# Patient Record
Sex: MALE | Race: WHITE | Employment: FULL TIME | URBAN - METROPOLITAN AREA
[De-identification: names, ages, dates, MRNs, and addresses within clinical notes are randomized per-mention and may not be internally consistent; named-entity substitution may affect disease eponyms.]

---

## 2021-12-06 ENCOUNTER — APPOINTMENT (OUTPATIENT)
Dept: CT IMAGING | Age: 61
DRG: 853 | End: 2021-12-06
Attending: STUDENT IN AN ORGANIZED HEALTH CARE EDUCATION/TRAINING PROGRAM
Payer: COMMERCIAL

## 2021-12-06 ENCOUNTER — ANESTHESIA (OUTPATIENT)
Dept: SURGERY | Age: 61
DRG: 853 | End: 2021-12-06
Payer: COMMERCIAL

## 2021-12-06 ENCOUNTER — APPOINTMENT (OUTPATIENT)
Dept: GENERAL RADIOLOGY | Age: 61
DRG: 853 | End: 2021-12-06
Attending: UROLOGY
Payer: COMMERCIAL

## 2021-12-06 ENCOUNTER — ANESTHESIA EVENT (OUTPATIENT)
Dept: SURGERY | Age: 61
DRG: 853 | End: 2021-12-06
Payer: COMMERCIAL

## 2021-12-06 ENCOUNTER — HOSPITAL ENCOUNTER (INPATIENT)
Age: 61
LOS: 15 days | Discharge: HOME | DRG: 853 | End: 2021-12-21
Attending: STUDENT IN AN ORGANIZED HEALTH CARE EDUCATION/TRAINING PROGRAM | Admitting: INTERNAL MEDICINE
Payer: COMMERCIAL

## 2021-12-06 ENCOUNTER — APPOINTMENT (OUTPATIENT)
Dept: GENERAL RADIOLOGY | Age: 61
DRG: 853 | End: 2021-12-06
Attending: STUDENT IN AN ORGANIZED HEALTH CARE EDUCATION/TRAINING PROGRAM
Payer: COMMERCIAL

## 2021-12-06 DIAGNOSIS — N17.9 SEPSIS WITH ACUTE RENAL FAILURE WITHOUT SEPTIC SHOCK, DUE TO UNSPECIFIED ORGANISM, UNSPECIFIED ACUTE RENAL FAILURE TYPE (HCC): Primary | ICD-10-CM

## 2021-12-06 DIAGNOSIS — A41.9 SEPSIS WITH ACUTE RENAL FAILURE WITHOUT SEPTIC SHOCK, DUE TO UNSPECIFIED ORGANISM, UNSPECIFIED ACUTE RENAL FAILURE TYPE (HCC): Primary | ICD-10-CM

## 2021-12-06 DIAGNOSIS — N20.1 URETEROLITHIASIS: ICD-10-CM

## 2021-12-06 DIAGNOSIS — I21.4 NSTEMI (NON-ST ELEVATED MYOCARDIAL INFARCTION) (HCC): ICD-10-CM

## 2021-12-06 DIAGNOSIS — R07.89 OTHER CHEST PAIN: ICD-10-CM

## 2021-12-06 DIAGNOSIS — R65.20 SEPSIS WITH ACUTE RENAL FAILURE WITHOUT SEPTIC SHOCK, DUE TO UNSPECIFIED ORGANISM, UNSPECIFIED ACUTE RENAL FAILURE TYPE (HCC): Primary | ICD-10-CM

## 2021-12-06 PROBLEM — N12 PYELONEPHRITIS: Status: ACTIVE | Noted: 2021-12-06

## 2021-12-06 LAB
ALBUMIN SERPL-MCNC: 2.9 G/DL (ref 3.5–5)
ALBUMIN/GLOB SERPL: 0.8 {RATIO} (ref 1.1–2.2)
ALP SERPL-CCNC: 112 U/L (ref 45–117)
ALT SERPL-CCNC: 49 U/L (ref 12–78)
ANION GAP SERPL CALC-SCNC: 10 MMOL/L (ref 5–15)
APPEARANCE UR: ABNORMAL
AST SERPL W P-5'-P-CCNC: 31 U/L (ref 15–37)
BACTERIA URNS QL MICRO: ABNORMAL /HPF
BACTERIA URNS QL MICRO: ABNORMAL /HPF
BASOPHILS # BLD: 0 K/UL (ref 0–0.1)
BASOPHILS NFR BLD: 0 % (ref 0–1)
BILIRUB SERPL-MCNC: 1 MG/DL (ref 0.2–1)
BILIRUB UR QL: NEGATIVE
BUN SERPL-MCNC: 40 MG/DL (ref 6–20)
BUN/CREAT SERPL: 18 (ref 12–20)
CA-I BLD-MCNC: 8.5 MG/DL (ref 8.5–10.1)
CHLORIDE SERPL-SCNC: 96 MMOL/L (ref 97–108)
CK SERPL-CCNC: 1255 NG/ML (ref 39–308)
CK SERPL-CCNC: 572 U/L (ref 39–308)
CO2 SERPL-SCNC: 22 MMOL/L (ref 21–32)
COLOR UR: ABNORMAL
COVID-19 RAPID TEST, COVR: NOT DETECTED
CREAT SERPL-MCNC: 2.19 MG/DL (ref 0.7–1.3)
DIFFERENTIAL METHOD BLD: ABNORMAL
EOSINOPHIL # BLD: 0.1 K/UL (ref 0–0.4)
EOSINOPHIL NFR BLD: 4 % (ref 0–7)
ERYTHROCYTE [DISTWIDTH] IN BLOOD BY AUTOMATED COUNT: 12.7 % (ref 11.5–14.5)
FLUAV AG NPH QL IA: NEGATIVE
FLUBV AG NOSE QL IA: NEGATIVE
GLOBULIN SER CALC-MCNC: 3.8 G/DL (ref 2–4)
GLUCOSE BLD STRIP.AUTO-MCNC: 154 MG/DL (ref 65–117)
GLUCOSE SERPL-MCNC: 143 MG/DL (ref 65–100)
GLUCOSE UR STRIP.AUTO-MCNC: NEGATIVE MG/DL
HCT VFR BLD AUTO: 39.2 % (ref 36.6–50.3)
HGB BLD-MCNC: 13.4 G/DL (ref 12.1–17)
HGB UR QL STRIP: ABNORMAL
IMM GRANULOCYTES # BLD AUTO: 0 K/UL
IMM GRANULOCYTES NFR BLD AUTO: 0 %
KETONES UR QL STRIP.AUTO: NEGATIVE MG/DL
LACTATE SERPL-SCNC: 1.3 MMOL/L (ref 0.4–2)
LACTATE SERPL-SCNC: 2.8 MMOL/L (ref 0.4–2)
LEUKOCYTE ESTERASE UR QL STRIP.AUTO: NEGATIVE
LYMPHOCYTES # BLD: 0.1 K/UL (ref 0.8–3.5)
LYMPHOCYTES NFR BLD: 6 % (ref 12–49)
MCH RBC QN AUTO: 29.9 PG (ref 26–34)
MCHC RBC AUTO-ENTMCNC: 34.2 G/DL (ref 30–36.5)
MCV RBC AUTO: 87.5 FL (ref 80–99)
MONOCYTES # BLD: 0 K/UL (ref 0–1)
MONOCYTES NFR BLD: 0 % (ref 5–13)
NEUTS SEG # BLD: 2 K/UL (ref 1.8–8)
NEUTS SEG NFR BLD: 90 % (ref 32–75)
NITRITE UR QL STRIP.AUTO: NEGATIVE
NRBC # BLD: 0 K/UL (ref 0–0.01)
NRBC BLD-RTO: 0 PER 100 WBC
PERFORMED BY, TECHID: ABNORMAL
PH UR STRIP: 5 [PH] (ref 5–8)
PLATELET # BLD AUTO: 116 K/UL (ref 150–400)
POTASSIUM SERPL-SCNC: 4 MMOL/L (ref 3.5–5.1)
PROCALCITONIN SERPL-MCNC: 173.45 NG/ML
PROT SERPL-MCNC: 6.7 G/DL (ref 6.4–8.2)
PROT UR STRIP-MCNC: 100 MG/DL
RBC # BLD AUTO: 4.48 M/UL (ref 4.1–5.7)
RBC #/AREA URNS HPF: ABNORMAL /HPF (ref 0–5)
RBC #/AREA URNS HPF: ABNORMAL /HPF (ref 0–5)
RBC MORPH BLD: ABNORMAL
SODIUM SERPL-SCNC: 128 MMOL/L (ref 136–145)
SP GR UR REFRACTOMETRY: 1.01 (ref 1–1.03)
SPECIMEN SOURCE: NORMAL
TROPONIN-HIGH SENSITIVITY: 1138 NG/L (ref 0–76)
TROPONIN-HIGH SENSITIVITY: 156 NG/L (ref 0–76)
TROPONIN-HIGH SENSITIVITY: 873 NG/L (ref 0–76)
UROBILINOGEN UR QL STRIP.AUTO: 4 EU/DL (ref 0.1–1)
WBC # BLD AUTO: 2.2 K/UL (ref 4.1–11.1)
WBC URNS QL MICRO: ABNORMAL /HPF (ref 0–4)
WBC URNS QL MICRO: ABNORMAL /HPF (ref 0–4)

## 2021-12-06 PROCEDURE — 74011250637 HC RX REV CODE- 250/637: Performed by: STUDENT IN AN ORGANIZED HEALTH CARE EDUCATION/TRAINING PROGRAM

## 2021-12-06 PROCEDURE — 74011000250 HC RX REV CODE- 250: Performed by: NURSE ANESTHETIST, CERTIFIED REGISTERED

## 2021-12-06 PROCEDURE — 52332 CYSTOSCOPY AND TREATMENT: CPT | Performed by: UROLOGY

## 2021-12-06 PROCEDURE — 99285 EMERGENCY DEPT VISIT HI MDM: CPT

## 2021-12-06 PROCEDURE — 87804 INFLUENZA ASSAY W/OPTIC: CPT

## 2021-12-06 PROCEDURE — 96374 THER/PROPH/DIAG INJ IV PUSH: CPT

## 2021-12-06 PROCEDURE — 77030040831 HC BAG URINE DRNG MDII -A: Performed by: UROLOGY

## 2021-12-06 PROCEDURE — BT1F1ZZ FLUOROSCOPY OF LEFT KIDNEY, URETER AND BLADDER USING LOW OSMOLAR CONTRAST: ICD-10-PCS | Performed by: UROLOGY

## 2021-12-06 PROCEDURE — 2709999900 HC NON-CHARGEABLE SUPPLY: Performed by: UROLOGY

## 2021-12-06 PROCEDURE — 76010000138 HC OR TIME 0.5 TO 1 HR: Performed by: UROLOGY

## 2021-12-06 PROCEDURE — 96375 TX/PRO/DX INJ NEW DRUG ADDON: CPT

## 2021-12-06 PROCEDURE — 82962 GLUCOSE BLOOD TEST: CPT

## 2021-12-06 PROCEDURE — 77030010509 HC AIRWY LMA MSK TELE -A: Performed by: ANESTHESIOLOGY

## 2021-12-06 PROCEDURE — 74011250636 HC RX REV CODE- 250/636: Performed by: STUDENT IN AN ORGANIZED HEALTH CARE EDUCATION/TRAINING PROGRAM

## 2021-12-06 PROCEDURE — 84145 PROCALCITONIN (PCT): CPT

## 2021-12-06 PROCEDURE — 94760 N-INVAS EAR/PLS OXIMETRY 1: CPT

## 2021-12-06 PROCEDURE — 74011250637 HC RX REV CODE- 250/637: Performed by: HOSPITALIST

## 2021-12-06 PROCEDURE — 74011250636 HC RX REV CODE- 250/636: Performed by: INTERNAL MEDICINE

## 2021-12-06 PROCEDURE — 87635 SARS-COV-2 COVID-19 AMP PRB: CPT

## 2021-12-06 PROCEDURE — 74011250636 HC RX REV CODE- 250/636: Performed by: NURSE ANESTHETIST, CERTIFIED REGISTERED

## 2021-12-06 PROCEDURE — 96361 HYDRATE IV INFUSION ADD-ON: CPT

## 2021-12-06 PROCEDURE — 83605 ASSAY OF LACTIC ACID: CPT

## 2021-12-06 PROCEDURE — C1769 GUIDE WIRE: HCPCS | Performed by: UROLOGY

## 2021-12-06 PROCEDURE — 82550 ASSAY OF CK (CPK): CPT

## 2021-12-06 PROCEDURE — 87040 BLOOD CULTURE FOR BACTERIA: CPT

## 2021-12-06 PROCEDURE — 74011250636 HC RX REV CODE- 250/636: Performed by: HOSPITALIST

## 2021-12-06 PROCEDURE — 36415 COLL VENOUS BLD VENIPUNCTURE: CPT

## 2021-12-06 PROCEDURE — 74420 UROGRAPHY RTRGR +-KUB: CPT | Performed by: UROLOGY

## 2021-12-06 PROCEDURE — C1758 CATHETER, URETERAL: HCPCS | Performed by: UROLOGY

## 2021-12-06 PROCEDURE — 0T778DZ DILATION OF LEFT URETER WITH INTRALUMINAL DEVICE, VIA NATURAL OR ARTIFICIAL OPENING ENDOSCOPIC: ICD-10-PCS | Performed by: UROLOGY

## 2021-12-06 PROCEDURE — 65610000006 HC RM INTENSIVE CARE

## 2021-12-06 PROCEDURE — 74011250636 HC RX REV CODE- 250/636: Performed by: NURSE PRACTITIONER

## 2021-12-06 PROCEDURE — C2617 STENT, NON-COR, TEM W/O DEL: HCPCS | Performed by: UROLOGY

## 2021-12-06 PROCEDURE — 93005 ELECTROCARDIOGRAM TRACING: CPT

## 2021-12-06 PROCEDURE — 74176 CT ABD & PELVIS W/O CONTRAST: CPT

## 2021-12-06 PROCEDURE — 99222 1ST HOSP IP/OBS MODERATE 55: CPT | Performed by: UROLOGY

## 2021-12-06 PROCEDURE — 82553 CREATINE MB FRACTION: CPT

## 2021-12-06 PROCEDURE — 74011250637 HC RX REV CODE- 250/637: Performed by: INTERNAL MEDICINE

## 2021-12-06 PROCEDURE — 85025 COMPLETE CBC W/AUTO DIFF WBC: CPT

## 2021-12-06 PROCEDURE — 52276 CYSTOSCOPY AND TREATMENT: CPT | Performed by: UROLOGY

## 2021-12-06 PROCEDURE — 71045 X-RAY EXAM CHEST 1 VIEW: CPT

## 2021-12-06 PROCEDURE — 81001 URINALYSIS AUTO W/SCOPE: CPT

## 2021-12-06 PROCEDURE — C9113 INJ PANTOPRAZOLE SODIUM, VIA: HCPCS | Performed by: NURSE PRACTITIONER

## 2021-12-06 PROCEDURE — 74011636637 HC RX REV CODE- 636/637: Performed by: INTERNAL MEDICINE

## 2021-12-06 PROCEDURE — 87086 URINE CULTURE/COLONY COUNT: CPT

## 2021-12-06 PROCEDURE — 76000 FLUOROSCOPY <1 HR PHYS/QHP: CPT

## 2021-12-06 PROCEDURE — 84484 ASSAY OF TROPONIN QUANT: CPT

## 2021-12-06 PROCEDURE — 80053 COMPREHEN METABOLIC PANEL: CPT

## 2021-12-06 PROCEDURE — 77030034696 HC CATH URETH FOL 2W BARD -A: Performed by: UROLOGY

## 2021-12-06 PROCEDURE — 76210000006 HC OR PH I REC 0.5 TO 1 HR: Performed by: UROLOGY

## 2021-12-06 PROCEDURE — 74011000250 HC RX REV CODE- 250: Performed by: STUDENT IN AN ORGANIZED HEALTH CARE EDUCATION/TRAINING PROGRAM

## 2021-12-06 PROCEDURE — 74011000250 HC RX REV CODE- 250: Performed by: NURSE PRACTITIONER

## 2021-12-06 PROCEDURE — 76060000032 HC ANESTHESIA 0.5 TO 1 HR: Performed by: UROLOGY

## 2021-12-06 DEVICE — URETERAL STENT
Type: IMPLANTABLE DEVICE | Site: URETER | Status: FUNCTIONAL
Brand: CONTOUR™

## 2021-12-06 RX ORDER — LIDOCAINE HYDROCHLORIDE 20 MG/ML
INJECTION, SOLUTION EPIDURAL; INFILTRATION; INTRACAUDAL; PERINEURAL AS NEEDED
Status: DISCONTINUED | OUTPATIENT
Start: 2021-12-06 | End: 2021-12-06 | Stop reason: HOSPADM

## 2021-12-06 RX ORDER — FENTANYL CITRATE 50 UG/ML
INJECTION, SOLUTION INTRAMUSCULAR; INTRAVENOUS AS NEEDED
Status: DISCONTINUED | OUTPATIENT
Start: 2021-12-06 | End: 2021-12-06 | Stop reason: HOSPADM

## 2021-12-06 RX ORDER — CEFAZOLIN SODIUM 1 G/3ML
INJECTION, POWDER, FOR SOLUTION INTRAMUSCULAR; INTRAVENOUS AS NEEDED
Status: DISCONTINUED | OUTPATIENT
Start: 2021-12-06 | End: 2021-12-06 | Stop reason: HOSPADM

## 2021-12-06 RX ORDER — ONDANSETRON 2 MG/ML
INJECTION INTRAMUSCULAR; INTRAVENOUS AS NEEDED
Status: DISCONTINUED | OUTPATIENT
Start: 2021-12-06 | End: 2021-12-06 | Stop reason: HOSPADM

## 2021-12-06 RX ORDER — NORETHINDRONE AND ETHINYL ESTRADIOL 0.5-0.035
5 KIT ORAL AS NEEDED
Status: DISCONTINUED | OUTPATIENT
Start: 2021-12-06 | End: 2021-12-08

## 2021-12-06 RX ORDER — SODIUM CHLORIDE 0.9 % (FLUSH) 0.9 %
5-40 SYRINGE (ML) INJECTION AS NEEDED
Status: CANCELLED | OUTPATIENT
Start: 2021-12-06

## 2021-12-06 RX ORDER — HYDROMORPHONE HYDROCHLORIDE 1 MG/ML
0.4 INJECTION, SOLUTION INTRAMUSCULAR; INTRAVENOUS; SUBCUTANEOUS
Status: DISCONTINUED | OUTPATIENT
Start: 2021-12-06 | End: 2021-12-08

## 2021-12-06 RX ORDER — SODIUM CHLORIDE, SODIUM LACTATE, POTASSIUM CHLORIDE, CALCIUM CHLORIDE 600; 310; 30; 20 MG/100ML; MG/100ML; MG/100ML; MG/100ML
INJECTION, SOLUTION INTRAVENOUS
Status: DISCONTINUED | OUTPATIENT
Start: 2021-12-06 | End: 2021-12-06 | Stop reason: HOSPADM

## 2021-12-06 RX ORDER — DIPHENHYDRAMINE HYDROCHLORIDE 50 MG/ML
12.5 INJECTION, SOLUTION INTRAMUSCULAR; INTRAVENOUS AS NEEDED
Status: ACTIVE | OUTPATIENT
Start: 2021-12-06 | End: 2021-12-06

## 2021-12-06 RX ORDER — INSULIN LISPRO 100 [IU]/ML
INJECTION, SOLUTION INTRAVENOUS; SUBCUTANEOUS
Status: DISCONTINUED | OUTPATIENT
Start: 2021-12-06 | End: 2021-12-21 | Stop reason: HOSPADM

## 2021-12-06 RX ORDER — LIDOCAINE HYDROCHLORIDE 10 MG/ML
0.1 INJECTION, SOLUTION EPIDURAL; INFILTRATION; INTRACAUDAL; PERINEURAL AS NEEDED
Status: CANCELLED | OUTPATIENT
Start: 2021-12-06

## 2021-12-06 RX ORDER — EPHEDRINE SULFATE/0.9% NACL/PF 50 MG/5 ML
SYRINGE (ML) INTRAVENOUS AS NEEDED
Status: DISCONTINUED | OUTPATIENT
Start: 2021-12-06 | End: 2021-12-06 | Stop reason: HOSPADM

## 2021-12-06 RX ORDER — POLYETHYLENE GLYCOL 3350 17 G/17G
17 POWDER, FOR SOLUTION ORAL DAILY PRN
Status: DISCONTINUED | OUTPATIENT
Start: 2021-12-06 | End: 2021-12-21 | Stop reason: HOSPADM

## 2021-12-06 RX ORDER — MIDAZOLAM HYDROCHLORIDE 1 MG/ML
1 INJECTION, SOLUTION INTRAMUSCULAR; INTRAVENOUS AS NEEDED
Status: CANCELLED | OUTPATIENT
Start: 2021-12-06

## 2021-12-06 RX ORDER — DEXTROSE 50 % IN WATER (D50W) INTRAVENOUS SYRINGE
25-50 AS NEEDED
Status: DISCONTINUED | OUTPATIENT
Start: 2021-12-06 | End: 2021-12-21 | Stop reason: HOSPADM

## 2021-12-06 RX ORDER — HYDROCODONE BITARTRATE AND ACETAMINOPHEN 5; 325 MG/1; MG/1
1 TABLET ORAL AS NEEDED
Status: DISCONTINUED | OUTPATIENT
Start: 2021-12-06 | End: 2021-12-08

## 2021-12-06 RX ORDER — SODIUM CHLORIDE 0.9 % (FLUSH) 0.9 %
5-40 SYRINGE (ML) INJECTION EVERY 8 HOURS
Status: CANCELLED | OUTPATIENT
Start: 2021-12-06

## 2021-12-06 RX ORDER — PROPOFOL 10 MG/ML
INJECTION, EMULSION INTRAVENOUS AS NEEDED
Status: DISCONTINUED | OUTPATIENT
Start: 2021-12-06 | End: 2021-12-06 | Stop reason: HOSPADM

## 2021-12-06 RX ORDER — ONDANSETRON 2 MG/ML
4 INJECTION INTRAMUSCULAR; INTRAVENOUS
Status: DISCONTINUED | OUTPATIENT
Start: 2021-12-06 | End: 2021-12-21 | Stop reason: HOSPADM

## 2021-12-06 RX ORDER — ENOXAPARIN SODIUM 100 MG/ML
40 INJECTION SUBCUTANEOUS DAILY
Status: DISCONTINUED | OUTPATIENT
Start: 2021-12-07 | End: 2021-12-07

## 2021-12-06 RX ORDER — ASPIRIN 81 MG/1
81 TABLET ORAL DAILY
Status: DISCONTINUED | OUTPATIENT
Start: 2021-12-07 | End: 2021-12-21 | Stop reason: HOSPADM

## 2021-12-06 RX ORDER — SODIUM CHLORIDE 0.9 % (FLUSH) 0.9 %
5-40 SYRINGE (ML) INJECTION AS NEEDED
Status: DISCONTINUED | OUTPATIENT
Start: 2021-12-06 | End: 2021-12-08

## 2021-12-06 RX ORDER — ONDANSETRON 4 MG/1
4 TABLET, ORALLY DISINTEGRATING ORAL
Status: DISCONTINUED | OUTPATIENT
Start: 2021-12-06 | End: 2021-12-21 | Stop reason: HOSPADM

## 2021-12-06 RX ORDER — METFORMIN HYDROCHLORIDE 500 MG/1
500 TABLET ORAL 2 TIMES DAILY WITH MEALS
COMMUNITY
End: 2021-12-21

## 2021-12-06 RX ORDER — DEXAMETHASONE SODIUM PHOSPHATE 4 MG/ML
INJECTION, SOLUTION INTRA-ARTICULAR; INTRALESIONAL; INTRAMUSCULAR; INTRAVENOUS; SOFT TISSUE AS NEEDED
Status: DISCONTINUED | OUTPATIENT
Start: 2021-12-06 | End: 2021-12-06 | Stop reason: HOSPADM

## 2021-12-06 RX ORDER — ACETAMINOPHEN 325 MG/1
650 TABLET ORAL
Status: COMPLETED | OUTPATIENT
Start: 2021-12-06 | End: 2021-12-06

## 2021-12-06 RX ORDER — MIDAZOLAM HYDROCHLORIDE 1 MG/ML
INJECTION, SOLUTION INTRAMUSCULAR; INTRAVENOUS AS NEEDED
Status: DISCONTINUED | OUTPATIENT
Start: 2021-12-06 | End: 2021-12-06 | Stop reason: HOSPADM

## 2021-12-06 RX ORDER — KETOROLAC TROMETHAMINE 30 MG/ML
INJECTION, SOLUTION INTRAMUSCULAR; INTRAVENOUS AS NEEDED
Status: DISCONTINUED | OUTPATIENT
Start: 2021-12-06 | End: 2021-12-06 | Stop reason: HOSPADM

## 2021-12-06 RX ORDER — ALLOPURINOL 300 MG/1
300 TABLET ORAL DAILY
COMMUNITY
End: 2022-08-25

## 2021-12-06 RX ORDER — ONDANSETRON 2 MG/ML
4 INJECTION INTRAMUSCULAR; INTRAVENOUS AS NEEDED
Status: DISCONTINUED | OUTPATIENT
Start: 2021-12-06 | End: 2021-12-08

## 2021-12-06 RX ORDER — HYDROMORPHONE HYDROCHLORIDE 1 MG/ML
0.5 INJECTION, SOLUTION INTRAMUSCULAR; INTRAVENOUS; SUBCUTANEOUS
Status: DISCONTINUED | OUTPATIENT
Start: 2021-12-06 | End: 2021-12-21 | Stop reason: HOSPADM

## 2021-12-06 RX ORDER — METRONIDAZOLE 500 MG/1
500 TABLET ORAL
Status: COMPLETED | OUTPATIENT
Start: 2021-12-06 | End: 2021-12-06

## 2021-12-06 RX ORDER — ASPIRIN 325 MG
325 TABLET ORAL ONCE
Status: COMPLETED | OUTPATIENT
Start: 2021-12-06 | End: 2021-12-06

## 2021-12-06 RX ORDER — MIDAZOLAM HYDROCHLORIDE 1 MG/ML
0.5 INJECTION, SOLUTION INTRAMUSCULAR; INTRAVENOUS
Status: DISCONTINUED | OUTPATIENT
Start: 2021-12-06 | End: 2021-12-08

## 2021-12-06 RX ORDER — MAGNESIUM SULFATE 100 %
4 CRYSTALS MISCELLANEOUS AS NEEDED
Status: DISCONTINUED | OUTPATIENT
Start: 2021-12-06 | End: 2021-12-21 | Stop reason: HOSPADM

## 2021-12-06 RX ORDER — SODIUM CHLORIDE 0.9 % (FLUSH) 0.9 %
5-40 SYRINGE (ML) INJECTION AS NEEDED
Status: DISCONTINUED | OUTPATIENT
Start: 2021-12-06 | End: 2021-12-21 | Stop reason: HOSPADM

## 2021-12-06 RX ORDER — FENTANYL CITRATE 50 UG/ML
50 INJECTION, SOLUTION INTRAMUSCULAR; INTRAVENOUS
Status: DISCONTINUED | OUTPATIENT
Start: 2021-12-06 | End: 2021-12-08

## 2021-12-06 RX ORDER — SODIUM CHLORIDE 9 MG/ML
125 INJECTION, SOLUTION INTRAVENOUS CONTINUOUS
Status: DISPENSED | OUTPATIENT
Start: 2021-12-06 | End: 2021-12-07

## 2021-12-06 RX ORDER — ONDANSETRON 2 MG/ML
4 INJECTION INTRAMUSCULAR; INTRAVENOUS
Status: COMPLETED | OUTPATIENT
Start: 2021-12-06 | End: 2021-12-06

## 2021-12-06 RX ORDER — LOSARTAN POTASSIUM 25 MG/1
25 TABLET ORAL DAILY
COMMUNITY
End: 2022-08-25

## 2021-12-06 RX ORDER — FENTANYL CITRATE 50 UG/ML
50 INJECTION, SOLUTION INTRAMUSCULAR; INTRAVENOUS AS NEEDED
Status: CANCELLED | OUTPATIENT
Start: 2021-12-06

## 2021-12-06 RX ORDER — ACETAMINOPHEN 650 MG/1
650 SUPPOSITORY RECTAL
Status: DISCONTINUED | OUTPATIENT
Start: 2021-12-06 | End: 2021-12-21 | Stop reason: HOSPADM

## 2021-12-06 RX ORDER — LEVOTHYROXINE SODIUM 200 UG/1
200 TABLET ORAL
COMMUNITY
Start: 2021-11-26

## 2021-12-06 RX ORDER — SODIUM CHLORIDE 0.9 % (FLUSH) 0.9 %
5-40 SYRINGE (ML) INJECTION EVERY 8 HOURS
Status: DISCONTINUED | OUTPATIENT
Start: 2021-12-06 | End: 2021-12-12

## 2021-12-06 RX ORDER — SODIUM CHLORIDE 0.9 % (FLUSH) 0.9 %
5-40 SYRINGE (ML) INJECTION EVERY 8 HOURS
Status: DISCONTINUED | OUTPATIENT
Start: 2021-12-06 | End: 2021-12-21 | Stop reason: HOSPADM

## 2021-12-06 RX ORDER — ACETAMINOPHEN 325 MG/1
650 TABLET ORAL
Status: DISCONTINUED | OUTPATIENT
Start: 2021-12-06 | End: 2021-12-21 | Stop reason: HOSPADM

## 2021-12-06 RX ADMIN — Medication 5 MG: at 18:48

## 2021-12-06 RX ADMIN — CEFTRIAXONE SODIUM 2 G: 2 INJECTION, POWDER, FOR SOLUTION INTRAMUSCULAR; INTRAVENOUS at 15:22

## 2021-12-06 RX ADMIN — MIDAZOLAM HYDROCHLORIDE 2 MG: 2 INJECTION, SOLUTION INTRAMUSCULAR; INTRAVENOUS at 18:19

## 2021-12-06 RX ADMIN — DEXAMETHASONE SODIUM PHOSPHATE 4 MG: 4 INJECTION, SOLUTION INTRA-ARTICULAR; INTRALESIONAL; INTRAMUSCULAR; INTRAVENOUS; SOFT TISSUE at 18:33

## 2021-12-06 RX ADMIN — Medication 5 MG: at 18:50

## 2021-12-06 RX ADMIN — SODIUM CHLORIDE 125 ML/HR: 9 INJECTION, SOLUTION INTRAVENOUS at 21:18

## 2021-12-06 RX ADMIN — INSULIN LISPRO 2 UNITS: 100 INJECTION, SOLUTION INTRAVENOUS; SUBCUTANEOUS at 21:16

## 2021-12-06 RX ADMIN — Medication 10 ML: at 21:32

## 2021-12-06 RX ADMIN — PHENYLEPHRINE HYDROCHLORIDE 200 MCG: 10 INJECTION INTRAVENOUS at 18:30

## 2021-12-06 RX ADMIN — PROPOFOL 220 MG: 10 INJECTION, EMULSION INTRAVENOUS at 18:24

## 2021-12-06 RX ADMIN — METRONIDAZOLE 500 MG: 500 TABLET ORAL at 15:22

## 2021-12-06 RX ADMIN — CEFAZOLIN SODIUM 3 G: 1 INJECTION, POWDER, FOR SOLUTION INTRAMUSCULAR; INTRAVENOUS at 18:27

## 2021-12-06 RX ADMIN — PHENYLEPHRINE HYDROCHLORIDE 300 MCG: 10 INJECTION INTRAVENOUS at 18:35

## 2021-12-06 RX ADMIN — ASPIRIN 325 MG ORAL TABLET 325 MG: 325 PILL ORAL at 20:22

## 2021-12-06 RX ADMIN — SODIUM CHLORIDE 1000 ML: 9 INJECTION, SOLUTION INTRAVENOUS at 15:16

## 2021-12-06 RX ADMIN — FENTANYL CITRATE 50 MCG: 50 INJECTION, SOLUTION INTRAMUSCULAR; INTRAVENOUS at 18:19

## 2021-12-06 RX ADMIN — ONDANSETRON 4 MG: 2 INJECTION INTRAMUSCULAR; INTRAVENOUS at 15:14

## 2021-12-06 RX ADMIN — HYDROMORPHONE HYDROCHLORIDE 0.5 MG: 1 INJECTION, SOLUTION INTRAMUSCULAR; INTRAVENOUS; SUBCUTANEOUS at 21:45

## 2021-12-06 RX ADMIN — ACETAMINOPHEN 650 MG: 325 TABLET ORAL at 20:23

## 2021-12-06 RX ADMIN — ONDANSETRON 4 MG: 2 INJECTION INTRAMUSCULAR; INTRAVENOUS at 18:33

## 2021-12-06 RX ADMIN — PROPOFOL 30 MG: 10 INJECTION, EMULSION INTRAVENOUS at 18:42

## 2021-12-06 RX ADMIN — ACETAMINOPHEN 650 MG: 325 TABLET ORAL at 15:15

## 2021-12-06 RX ADMIN — KETOROLAC TROMETHAMINE 30 MG: 30 INJECTION, SOLUTION INTRAMUSCULAR at 18:41

## 2021-12-06 RX ADMIN — SODIUM CHLORIDE 40 MG: 9 INJECTION, SOLUTION INTRAMUSCULAR; INTRAVENOUS; SUBCUTANEOUS at 21:15

## 2021-12-06 RX ADMIN — Medication 10 ML: at 21:31

## 2021-12-06 RX ADMIN — SODIUM CHLORIDE, POTASSIUM CHLORIDE, SODIUM LACTATE AND CALCIUM CHLORIDE: 600; 310; 30; 20 INJECTION, SOLUTION INTRAVENOUS at 18:10

## 2021-12-06 RX ADMIN — PHENYLEPHRINE HYDROCHLORIDE 300 MCG: 10 INJECTION INTRAVENOUS at 18:38

## 2021-12-06 RX ADMIN — LIDOCAINE HYDROCHLORIDE 100 MG: 20 INJECTION, SOLUTION EPIDURAL; INFILTRATION; INTRACAUDAL; PERINEURAL at 18:24

## 2021-12-06 RX ADMIN — Medication 10 ML: at 21:19

## 2021-12-06 RX ADMIN — PHENYLEPHRINE HYDROCHLORIDE 200 MCG: 10 INJECTION INTRAVENOUS at 18:33

## 2021-12-06 RX ADMIN — Medication 10 MG: at 18:43

## 2021-12-06 NOTE — CONSULTS
Spoke with Dr. Jose C Micheleor regarding admission order for medical admit/ elevated troponin level, telephone order received to change admission to remote tele.

## 2021-12-06 NOTE — ED NOTES
ASSISTED WITH URINATION VIA URINAL, X1 BLANKET APPLICATION, PLACED IN PATIENT GOWN, BELONGINGS PLACED IN PATIENT BELONGING BAGS X2, LABELED WITH PT LABEL. NAD. REPORT GIVEN TO HOMA ABAD.

## 2021-12-06 NOTE — ROUTINE PROCESS
TRANSFER - OUT REPORT:    Verbal report given to Britt on [facility]  being transferred to PACU for ordered procedure       Report consisted of patients Situation, Background, Assessment and   Recommendations(SBAR). Information from the following report(s) SBAR, ED Summary, Accordion and Recent Results was reviewed with the receiving nurse. Lines:   Peripheral IV 12/06/21 Anterior; Right Forearm (Active)        Opportunity for questions and clarification was provided.       Patient transported with:   O2 @ 4 liters  Tech

## 2021-12-06 NOTE — CONSULTS
UROLOGY CONSULT    Maximo Sanz, [id number] [facility] Office    Patient: Orlando Mathis MRN: [id number]  SSN: [id number]    YOB: 1960  Age: 59 y.o. Sex: male          Date of Encounter:  December 6, 2021  ADMITTED: 12/6/2021 to Alicia Beaulieu MD by Ranjith Reddy MD for Pyelonephritis [N12]  Chief Complaint: Fever, abdominal pain  Reason for consult: Kidney stone, sepsis           History of Present Illness:  Patient is a 59 y.o. male admitted 12/6/2021 to the hospital for Pyelonephritis [N12]. He has a past medical history of diabetes and hypothyroidism. He also has a history of prostate cancer status post prostatectomy 11 years ago in [facility] he states he is remission. He has no urinary symptoms. [facility] and rigors, acute pyelonephritis and acute kidney injury. .  He had left lower abdominal and flank pain. He had fever and tachycardia. Reportedly his temperature was 102.9. CT reveals a 5 mm distal left ureteral stone with perinephric stranding. White count is 2.2. Currently his back pain is little better. He does have nausea and epigastric pain. CT scan did reveal some duodenal thickening. Past Medical History:  No Known Allergies   Prior to Admission medications    Medication Sig Start Date End Date Taking? Authorizing Provider   allopurinoL (ZYLOPRIM) 300 mg tablet Take 300 mg by mouth daily. Yes Provider, Historical   metFORMIN (GLUCOPHAGE) 500 mg tablet Take 500 mg by mouth two (2) times daily (with meals). Yes Provider, Historical   levothyroxine sodium (LEVOTHYROXINE PO) Take 260 mcg by mouth Daily (before breakfast). Yes Provider, Historical   losartan (COZAAR) 25 mg tablet Take 25 mg by mouth daily. Yes Provider, Historical      PMHx:  has a past medical history of Diabetes ([address] Gout, Hypertension, Hypothyroidism, Prostate cancer ([address] and Renal stones.    PSurgHx:  has a past surgical history that includes hx prostate surgery. PSocHx:  reports that he has never smoked. He has never used smokeless tobacco. He reports previous alcohol use. He reports that he does not use drugs. ROS:  Admission ROS by Mumtaz Rascon MD from 2021 were reviewed with the patient and changes (other than per HPI) include: none. All 12 systems were reviewed and were otherwise negative. Physical Exam:            Vitals[de-identified]    Temp (24hrs), Av.3 °F (38.5 °C), Min:99.7 °F (37.6 °C), Max:102.9 °F (39.4 °C)   Blood pressure 111/82, pulse 88, temperature 99.7 °F (37.6 °C), resp. rate 18, height 6' 9\" (2.057 m), weight 236 lb (107 kg), SpO2 96 %. Estimated body mass index is 25.29 kg/m² as calculated from the following:    Height as of this encounter: 6' 9\" (2.057 m). Weight as of this encounter: 236 lb (107 kg).              I&O's:    701 - 1900  In: 1000 [I.V.:1000]  Out: -    General Well developed, in NAD   Conjunctiva/Lids Normal without gross defects   Pupil/Iris Pupils equal, round, reactive, anicteric   External Ears/Nose No lesions or deformities   Hearing  Grossly intact   Neck Supple without obvious, masses   Lymphatic No obvious supraclavicular or cervical adenopathy   Respiratory Effort Breathing easily, no audible wheezing, rhonchi, stridor   CV RRR   Abdomen / Flank Soft, non tender without guarding or rebound, without obvious masses, no CVA tenderness   Digits/ Nails No clubbing, cyanosis, petechiae         Skin Inspection Warm and dry, no obvious rashes   Neurologic Grossly normal without focal deficits   Judgement / Insight intact   Mood / Affect normal       Lab Results   Component Value Date/Time    WBC 2.2 (L) 2021 03:04 PM    HCT 39.2 2021 03:04 PM    PLATELET 022 (L)  03:04 PM    Sodium 128 (L) 2021 03:04 PM    Potassium 4.0 2021 03:04 PM    Chloride 96 (L) 2021 03:04 PM    CO2 22 2021 03:04 PM    BUN 40 (H) 2021 03:04 PM Creatinine 2.19 (H) 12/06/2021 03:04 PM    Glucose 143 (H) 12/06/2021 03:04 PM    Calcium 8.5 12/06/2021 03:04 PM       UA:   Lab Results   Component Value Date/Time    Color Dark Yellow 12/06/2021 03:08 PM    Appearance Turbid (A) 12/06/2021 03:08 PM    Specific gravity 1.014 12/06/2021 03:08 PM    pH (UA) 5.0 12/06/2021 03:08 PM    Protein 100 (A) 12/06/2021 03:08 PM    Glucose Negative 12/06/2021 03:08 PM    Ketone Negative 12/06/2021 03:08 PM    Bilirubin Negative 12/06/2021 03:08 PM    Urobilinogen 4.0 (H) 12/06/2021 03:08 PM    Nitrites Negative 12/06/2021 03:08 PM    Leukocyte Esterase Negative 12/06/2021 03:08 PM    Bacteria 1+ (A) 12/06/2021 03:08 PM    Bacteria 1+ (A) 12/06/2021 03:08 PM    WBC 20-50 12/06/2021 03:08 PM    WBC 20-50 12/06/2021 03:08 PM    RBC 0-5 12/06/2021 03:08 PM    RBC 0-5 12/06/2021 03:08 PM       Xrays:       [facility] Problems  Never Reviewed          Codes Class Noted POA    Pyelonephritis ICD-10-CM: N12  ICD-9-CM: 590.80  12/6/2021 Unknown              Assessment/Plan:  Pyelonephritis, infected kidney stone. History of diabetes, history of prostate cancer    He appears to meet sepsis criteria. Infected kidney stone with obstruction. We discussed the urgent need for ureteral stenting. Risk, benefits and expected course were discussed with the patient and he wished to proceed. Abdominal pain may be related to duodenitis or ulcer. I would put him on a PPI postoperatively.      Surgery planned: Cystourethroscopy  Cystoscopy, retrograde pyelograms  LEFT  ureteral stent placement    Signed By: Cuba Rasmussen MD  - December 6, 2021

## 2021-12-06 NOTE — OP NOTES
UROLOGY OPERATIVE NOTE    Patient: Alexus Quintero MRN: [id number]  SSN: [id number]    YOB: 1960  Age: 59 y.o. Sex: male          Pre-operative Diagnosis: left ureteral stone, sepsis, pyelonephritis  Post-operative Diagnosis: left ureteral stone, sepsis, pyelonephritis, urethral stricture  Procedure: Cystoscopy, left retrograde pyelogram    ureteral stent placement  direct vision internal urethrotomy    Surgeon: Alex Solis MD  Assistant: none    Anesthesia:  General  Findings:   Fossa navicularis and mid urethral strictures    Interpretation of left retrograde pyelogram:  Radioopaque distal ureteral stone. Limited views of normal appearing left ureter and renal pelvis. No strictures, dilation, radiopacities or filling defects seen. The calyces were sharp and pristine. Estimated Blood Loss:  scant  Drains: 20F pena, 7F x 26cm JJ left ureteral stent  Specimens: renal urine culture  Complications: none           Procedure Details: The patient was seen in the pre-operative area. The risks, benefits, complications, alternative treatment options, and expected outcomes were again discussed with the patient. The possibilities of reaction to medication, pain, infection, bleeding, major cardiovascular event, death, damage to surrounding structures were specifically addressed. Informed consent was obtained. Upon arrival to the operative suite, the patient, procedure, and side were confirmed via a pre-operative \"time-out\". All were in agreement. The patient was carefully positioned and anesthesia was undertaken. Sterile prep and drape was accomplished. The patient was in the lithotomy position. Using a 21 [place] cystoscope with 30 degree lens cystourethroscopy was performed. There was a short tight fossa navicularis stricture. A visual urethrotome was used to incise the stricture at 12 o'clock. The cystoscope was reintroduced.   A mild midurethral stricture was dilated with the scope and the bladder was entered. The urine was cloudy. It was drained and irrigated several times. The bladder mucosa was normal in appearance without tumors, lesions or trabeculation seen. The ureteral orifices were seen on either side.  fluoroscopic imaging did reveal an obvious stones overlying the urinary tract near the left ureteral orifice within several centimeters. Using an open-ended catheter the left ureteral orifice was cannulated. Using Isovue a gentle retrograde pyelogram was performed of the distal ureter, confirming the stone. 0.035 sensor guidewire was used to navigate to the renal pelvis. The catheter was used to measure the ureteral length. There was purulent return from the orifice and the urine was collected for culture. A small amount of contrast was used to highlight the pelvis. Over the wire a 7F x 26cm JJ stent was introduced and in good position. The bladder was drained with a 20F pena, and the scope was removed. The patient was transported in stable condition to recovery.      Willard Robbins MD

## 2021-12-06 NOTE — ED PROVIDER NOTES
EMERGENCY DEPARTMENT HISTORY AND PHYSICAL EXAM      Date: 12/6/2021  Patient Name: Jony Ellis    History of Presenting Illness     Chief Complaint   Patient presents with    Fever       HPI: Jony Ellis, 59 y.o. male with a past medical history of hypothyroidism, diabetes, and hypertension on Metformin, losartan, presenting for sepsis. He went to [facility] for nausea, vomiting, and abdominal pain for the past 3 days. Abdominal pain was reported to be left-sided flank pain that comes in waves for the past few days. He has a history of nephrolithiasis. He went to patient [facility] today and was noted to have urinalysis that was positive as well as a white count of 15.8. He was referred here for further work-up. His creatinine was also 2.2 as outpatient. On arrival, the patient was septic. He denies any headache, chest pain, shortness of breath, or cough. He was noted to be hypoxic as well at 90%.       PCP: Unknown, Provider, MD    Current Facility-Administered Medications   Medication Dose Route Frequency Provider Last Rate Last Admin    sodium chloride (NS) flush 5-40 mL  5-40 mL IntraVENous Q8H Mumtaz Oconnell MD   10 mL at 12/06/21 2131    sodium chloride (NS) flush 5-40 mL  5-40 mL IntraVENous PRN Shirley Cordero MD        acetaminophen (TYLENOL) tablet 650 mg  650 mg Oral Q6H PRN Shirley Cordero MD   650 mg at 12/06/21 2023    Or    acetaminophen (TYLENOL) suppository 650 mg  650 mg Rectal Q6H PRN Shirley Cordero MD        polyethylene glycol [facility]) packet 17 g  17 g Oral DAILY PRN Shirley Cordero MD        ondansetron [facility] ODT) tablet 4 mg  4 mg Oral Q8H PRN Shirley Cordero MD        Or    [facility] 4 mg  4 mg IntraVENous Q6H PRN Shirley Cordero MD       [facility] ON 12/7/2021] enoxaparin (LOVENOX) injection 40 mg  40 mg SubCUTAneous DAILY Mumtaz Oconnell MD        HYDROmorphone (DILAUDID) syringe 0.5 mg  0.5 mg IntraVENous Q4H PRN Shirley Cordero MD       [facility] sodium chloride (NS) flush 5-40 mL  5-40 mL IntraVENous Q8H Marietta Carpenter MD   10 mL at 12/06/21 2132    sodium chloride (NS) flush 5-40 mL  5-40 mL IntraVENous PRN Marietta Carpenter MD   10 mL at 12/06/21 2119    HYDROcodone-acetaminophen (NORCO) 5-325 mg per tablet 1 Tablet  1 Tablet Oral PRN Marietta Carpenter MD        fentaNYL citrate (PF) injection 50 mcg  50 mcg IntraVENous Multiple Marietta Carpenter MD        HYDROmorphone (DILAUDID) syringe 0.4 mg  0.4 mg IntraVENous Multiple Marietta Carpenter MD        ondansetron [facility]) injection 4 mg  4 mg IntraVENous PRN Marietta Carpenter MD        midazolam (VERSED) injection 0.5 mg  0.5 mg IntraVENous Q5MIN PRN Marietta Carpenter MD        diphenhydrAMINE (BENADRYL) injection 12.5 mg  12.5 mg IntraVENous PRN Marietta Carpenter MD        ePHEDrine sulfate (AKOVAZ) 50 mg/mL injection 5 mg  5 mg IntraVENous PRN Marietta Carpenter MD        0.9% sodium chloride infusion  125 mL/hr IntraVENous CONTINUOUS Elva Davidson  mL/hr at 12/06/21 2118 125 mL/hr at 12/06/21 2118    [START ON 12/7/2021] cefTRIAXone (ROCEPHIN) 2 g in 0.9% sodium chloride (MBP/ADV) 50 mL MBP  2 g IntraVENous ACB Mumtaz Love MD        glucose chewable tablet 16 g  4 Tablet Oral PRN David lAlred MD        dextrose (D50W) injection syrg 12.5-25 g  25-50 mL IntraVENous PRN David Allred MD        glucagon [facility]) injection 1 mg  1 mg IntraMUSCular PRN David Allred MD        insulin lispro (HUMALOG) injection   SubCUTAneous AC&HS David Allred MD   2 Units at 12/06/21 2116    pantoprazole (PROTONIX) 40 mg in 0.9% sodium chloride 10 mL injection  40 mg IntraVENous Kaylan Owen NP   40 mg at 12/06/21 2115    [START ON 12/7/2021] aspirin delayed-release tablet 81 mg  81 mg Oral DAILY Elva Davidson MD           Medical History   I reviewed the medical, surgical, family, and social history, as well as allergies:    Past Medical History:  Past Medical History:   Diagnosis Date    Diabetes ([facility] [address]     Gout     Hypertension     Hypothyroidism     Prostate cancer ([facility] [address]     Renal stones        Past Surgical History:  Past Surgical History:   Procedure Laterality Date    HX PROSTATE SURGERY         Family History:  History reviewed. No pertinent family history. Social History:  Social History     Tobacco Use    Smoking status: Never Smoker    Smokeless tobacco: Never Used   Substance Use Topics    Alcohol use: Not Currently    Drug use: Never       Allergies:  No Known Allergies    Review of Systems     Review of Systems   All other systems reviewed and are negative. Physical Exam and Vital Signs   Vital Signs - Reviewed the patient's vital signs. Patient Vitals for the past 12 hrs:   Temp Pulse Resp BP SpO2   12/06/21 1930 97.5 °F (36.4 °C) 86 21 117/86 98 %   12/06/21 1915 -- 85 21 122/84 98 %   12/06/21 1910 -- -- 22 117/82 98 %   12/06/21 1905 -- 84 28 108/73 99 %   12/06/21 1900 97 °F (36.1 °C) 89 13 107/68 97 %   12/06/21 1856 -- 89 22 101/63 98 %   12/06/21 1800 97.6 °F (36.4 °C) 89 18 132/78 93 %   12/06/21 1742 99.7 °F (37.6 °C) 88 18 111/82 96 %   12/06/21 1600 -- 91 18 129/76 97 %   12/06/21 1525 -- -- -- -- 96 %   12/06/21 1440 (!) 102.9 °F (39.4 °C) (!) 122 22 118/70 90 %       Physical Exam:    GENERAL: awake, alert, cooperative, not in distress  HEENT:  * Pupils equal, EOMI  * Head atraumatic  CV:  * regular rhythm  * warm and perfused extremities bilaterally  PULMONARY: Good air movement, no wheezes or crackles  ABDOMEN: soft, not distended, no guarding, not tenderness to palpation  : No suprapubic tenderness  EXTREMITIES/BACK: warm and perfused, no tenderness, no edema  SKIN: no rashes or signs of trauma  NEURO:  * Speech clear  * Moves U&LE to command      Medical Decision Making and ED Course   - I am the first and primary provider for this patient and am the primary provider of record.   - I reviewed the vital signs, available nursing notes, past medical history, past surgical history, family history and social history. - Initial assessment performed. The patients presenting problems have been discussed, and the staff are in agreement with the care plan formulated and outlined with them. I have encouraged them to ask questions as they arise throughout their visit. - Available medical records, nursing notes, old EKGs, and EMS run sheets (if patient was EMS transported) were reviewed    MDM:   Patient is a 59 y.o. male presenting for sepsis and concern for UTI. Vitals reveal tachycardia of 122, a fever of 39.4 and an SPO2 of 90% currently on 4 L of oxygen and physical exam reveals no abnormalities. EKG showed sinus tachycardia at 121 without any signs of ischemia. Based on the history, physical exam, risk factors, and vitals signs, differential includes: Abscess, UTI, kidney stone, infected nephrolithiasis or ureterolithiasis, cancer, anemia, FERMIN, rhabdomyolysis, pneumonia, COVID. Results     Labs:     Recent Results (from the past 12 hour(s))   TROPONIN-HIGH SENSITIVITY    Collection Time: 12/06/21  3:00 PM   Result Value Ref Range    Troponin-High Sensitivity 156 (HH) 0 - 76 ng/L   CBC WITH AUTOMATED DIFF    Collection Time: 12/06/21  3:04 PM   Result Value Ref Range    WBC 2.2 (L) 4.1 - 11.1 K/uL    RBC 4.48 4.10 - 5.70 M/uL    HGB 13.4 12.1 - 17.0 g/dL    HCT 39.2 36.6 - 50.3 %    MCV 87.5 80.0 - 99.0 FL    MCH 29.9 26.0 - 34.0 PG    MCHC 34.2 30.0 - 36.5 g/dL    RDW 12.7 11.5 - 14.5 %    PLATELET 028 (L) 516 - 400 K/uL    NRBC 0.0 0.0  WBC    ABSOLUTE NRBC 0.00 0.00 - 0.01 K/uL    NEUTROPHILS PENDING %    LYMPHOCYTES PENDING %    MONOCYTES PENDING %    EOSINOPHILS PENDING %    BASOPHILS PENDING %    IMMATURE GRANULOCYTES PENDING %    ABS. NEUTROPHILS PENDING K/UL    ABS. LYMPHOCYTES PENDING K/UL    ABS. MONOCYTES PENDING K/UL    ABS. EOSINOPHILS PENDING K/UL    ABS.  BASOPHILS PENDING K/UL ABS. IMM. GRANS. PENDING K/UL    DF PENDING    METABOLIC PANEL, COMPREHENSIVE    Collection Time: 12/06/21  3:04 PM   Result Value Ref Range    Sodium 128 (L) 136 - 145 mmol/L    Potassium 4.0 3.5 - 5.1 mmol/L    Chloride 96 (L) 97 - 108 mmol/L    CO2 22 21 - 32 mmol/L    Anion gap 10 5 - 15 mmol/L    Glucose 143 (H) 65 - 100 mg/dL    BUN 40 (H) 6 - 20 mg/dL    Creatinine 2.19 (H) 0.70 - 1.30 mg/dL    BUN/Creatinine ratio 18 12 - 20      GFR est AA 37 (L) >60 ml/min/1.73m2    GFR est non-AA 31 (L) >60 ml/min/1.73m2    Calcium 8.5 8.5 - 10.1 mg/dL    Bilirubin, total 1.0 0.2 - 1.0 mg/dL    AST (SGOT) 31 15 - 37 U/L    ALT (SGPT) 49 12 - 78 U/L    Alk.  phosphatase 112 45 - 117 U/L    Protein, total 6.7 6.4 - 8.2 g/dL    Albumin 2.9 (L) 3.5 - 5.0 g/dL    Globulin 3.8 2.0 - 4.0 g/dL    A-G Ratio 0.8 (L) 1.1 - 2.2     LACTIC ACID    Collection Time: 12/06/21  3:04 PM   Result Value Ref Range    Lactic acid 2.8 (HH) 0.4 - 2.0 mmol/L   URINALYSIS W/ RFLX MICROSCOPIC    Collection Time: 12/06/21  3:08 PM   Result Value Ref Range    Color Dark Yellow      Appearance Turbid (A) Clear      Specific gravity 1.014 1.003 - 1.030      pH (UA) 5.0 5.0 - 8.0      Protein 100 (A) Negative mg/dL    Glucose Negative Negative mg/dL    Ketone Negative Negative mg/dL    Bilirubin Negative Negative      Blood Large (A) Negative      Urobilinogen 4.0 (H) 0.1 - 1.0 EU/dL    Nitrites Negative Negative      Leukocyte Esterase Negative Negative      WBC 20-50 0 - 4 /hpf    RBC 0-5 0 - 5 /hpf    Bacteria 1+ (A) Negative /hpf   URINE MICROSCOPIC    Collection Time: 12/06/21  3:08 PM   Result Value Ref Range    WBC 20-50 0 - 4 /hpf    RBC 0-5 0 - 5 /hpf    Bacteria 1+ (A) Negative /hpf   COVID-19 RAPID TEST    Collection Time: 12/06/21  3:30 PM   Result Value Ref Range    Specimen source Nasopharyngeal      COVID-19 rapid test Not Detected Not Detected     INFLUENZA A & B AG (RAPID TEST)    Collection Time: 12/06/21  3:30 PM   Result Value Ref Range    Influenza A Antigen Negative Negative      Influenza B Antigen Negative Negative     CK    Collection Time: 12/06/21  4:15 PM   Result Value Ref Range     (H) 39 - 308 U/L   LACTIC ACID    Collection Time: 12/06/21  5:05 PM   Result Value Ref Range    Lactic acid 1.3 0.4 - 2.0 mmol/L   TROPONIN-HIGH SENSITIVITY    Collection Time: 12/06/21  5:50 PM   Result Value Ref Range    Troponin-High Sensitivity 1,138 (HH) 0 - 76 ng/L   GLUCOSE, POC    Collection Time: 12/06/21  7:42 PM   Result Value Ref Range    Glucose (POC) 154 (H) 65 - 117 mg/dL    Performed by [facility]        Radiologic Studies:  CT Results  (Last 48 hours)               12/06/21 1550  CT ABD PELV WO CONT Final result    Impression:  1. 5 x 5 mm obstructing stone in the distal left ureter. Left perinephric   inflammation. Correlate to medical renal disease to include renal dysfunction   and pyelonephritis. 2. Nonobstructing bilateral nephrolithiasis. 3. Thickening versus nondistention of the proximal duodenum. Correlate for any   clinical symptoms. 4. Trace bilateral pleural effusions. 5. Hepatomegaly. 6. Other findings above. Narrative:  Septic, right flank pain. History of stones. Additional history from emergency   room note: Left lower abdominal and flank pain. No comparison. Technique: Axial images abdomen and pelvis without IV or oral contrast.   Multiplanar reformatting. Dose reduction: All CT scans at this facility are performed using dose reduction   optimization techniques as appropriate to a performed exam including the   following: Automated exposure control, adjustments of the mA and/or kV according   to patient's size, or use of iterative reconstruction technique. FINDINGS: Trace bilateral pleural effusions, dependent atelectasis. 6 mm   thickness anterior pericardial fluid. Left hydronephrosis and hydroureter   secondary to a 5 x 5 mm stone in the distal left ureter. Nonobstructing punctate   left renal calcification. Multiple nonobstructing right renal calcifications,   the largest 8.6 mm. The bladder is collapsed. The prostate is small or absent. 21.5 cm Hepatomegaly. Gallbladder, spleen, adrenal glands unremarkable for   noncontrast technique. Abdominal aorta normal caliber. Calcific atherosclerosis. Small hiatal hernia. Bowel to include appendix nondilated. Few scattered   diverticula without diverticulitis. Thickening versus nondistention of the   proximal duodenum. Fatty atrophy of the pancreas. Strandy fluid about the left   kidney. No walled off fluid collection, free air, adenopathy. Bilateral inguinal   hernias containing noninflamed fat. Degenerative changes about the bony   structures. Chronic bilateral L3 pars defects with grade 1 anterolisthesis of L3   on L4. CXR Results  (Last 48 hours)               12/06/21 1546  XR CHEST PORT Final result    Narrative:  Chest single view. Minor left lung base subsegmental atelectasis. No gross interstitial or alveolar   pulmonary edema. Cardiac and mediastinal structures magnified on this AP upright   view of the chest. No pneumothorax or sizable pleural effusion.              Medications ordered:  Medications   sodium chloride (NS) flush 5-40 mL (10 mL IntraVENous Given 12/6/21 2131)   sodium chloride (NS) flush 5-40 mL (has no administration in time range)   acetaminophen (TYLENOL) tablet 650 mg (650 mg Oral Given 12/6/21 2023)     Or   acetaminophen (TYLENOL) suppository 650 mg ( Rectal See Alternative 12/6/21 2023)   polyethylene glycol (MIRALAX) packet 17 g (has no administration in time range)   ondansetron (ZOFRAN ODT) tablet 4 mg (has no administration in time range)     Or   ondansetron (ZOFRAN) injection 4 mg (has no administration in time range)   enoxaparin (LOVENOX) injection 40 mg (has no administration in time range)   HYDROmorphone (DILAUDID) syringe 0.5 mg (has no administration in time range)   sodium chloride (NS) flush 5-40 mL (10 mL IntraVENous Given 12/6/21 2132)   sodium chloride (NS) flush 5-40 mL (10 mL IntraVENous Given 12/6/21 2119)   HYDROcodone-acetaminophen (NORCO) 5-325 mg per tablet 1 Tablet (has no administration in time range)   fentaNYL citrate (PF) injection 50 mcg (has no administration in time range)   HYDROmorphone (DILAUDID) syringe 0.4 mg (has no administration in time range)   ondansetron (ZOFRAN) injection 4 mg (has no administration in time range)   midazolam (VERSED) injection 0.5 mg (has no administration in time range)   diphenhydrAMINE (BENADRYL) injection 12.5 mg (has no administration in time range)   ePHEDrine sulfate (AKOVAZ) 50 mg/mL injection 5 mg (has no administration in time range)   0.9% sodium chloride infusion (125 mL/hr IntraVENous New Bag 12/6/21 2118)   cefTRIAXone (ROCEPHIN) 2 g in 0.9% sodium chloride (MBP/ADV) 50 mL MBP (has no administration in time range)   glucose chewable tablet 16 g (has no administration in time range)   dextrose (D50W) injection syrg 12.5-25 g (has no administration in time range)   glucagon (GLUCAGEN) injection 1 mg (has no administration in time range)   insulin lispro (HUMALOG) injection (2 Units SubCUTAneous Given 12/6/21 2116)   pantoprazole (PROTONIX) 40 mg in 0.9% sodium chloride 10 mL injection (40 mg IntraVENous Given 12/6/21 2115)   aspirin delayed-release tablet 81 mg (has no administration in time range)   ondansetron (ZOFRAN) injection 4 mg (4 mg IntraVENous Given 12/6/21 1514)   acetaminophen (TYLENOL) tablet 650 mg (650 mg Oral Given 12/6/21 1515)   cefTRIAXone (ROCEPHIN) 2 g in sterile water (preservative free) 20 mL IV syringe (2 g IntraVENous Given 12/6/21 1522)   metroNIDAZOLE (FLAGYL) tablet 500 mg (500 mg Oral Given 12/6/21 1522)   sodium chloride 0.9 % bolus infusion 1,000 mL (0 mL IntraVENous IV Completed 12/6/21 1616)   sodium chloride 0.9 % bolus infusion 1,000 mL (1,000 mL IntraVENous New Bag 12/6/21 1516)   aspirin tablet 325 mg (325 mg Oral Given 12/6/21 2022)        ED Course     ED Course:     ED Course as of 12/06/21 2140   Mon Dec 06, 2021   1606 Chest x-ray negative, no concern for pulmonary edema, pleural effusion, pneumothorax, or pneumonia. [SS]   0391 UA shpws large blood without RBCs concerning for rhabdo, will obtain CPK level. WBC 20-50. [SS]   5721 And has hyponatremia as well as a creatinine of 2.19. Picture of FERMIN. Fluids being given. No evidence of pulmonary edema. Will give additional fluids. [SS]   0894 ICIPK-56 rapid test is negative. Flu swabs negative for flu A and B.    BC shows leukopenia, consistent with sepsis. CT abdomen revealed a 5x5 obstructing kidney stone. The patient may have had a kidney stone for the past few days and likely has a septic stone currently. Antibiotics given. Will consult urology. [SS]      ED Course User Index  [SS] Clement Jamison MD       Reassessment / Disposition / Discussion: To be admitted for septic stone. Urology to take to the OR. Final Disposition     Disposition: Condition stable  Admitted to Floor Medical Floor the case was discussed with the admitting physician     ADMISSION: After completion of ED workup and discussion of results and diagnoses with the patient, patient was admitted to the hospital. All the patient's questions were answered. Case was discussed with the receiving team.      ED Critical Care   and Critical Care  CRITICAL CARE NOTE :  3:59 PM    Critical care time is being documented due to the fulfillment of at least one of the following:    - Critical conditions: condition that acutely impairs one or more vital organ systems such that there is a high probability of imminent or life-threatening deterioration in condition. Examples are diagnoses including but not limited to Afib RVR, DKA, PE, Etc. .    - Critical interventions: an action whose failure to initiate would likely allow a sudden, clinically significant decline in the patient's condition. These include  Requirement of transfer or ICU admission  Contemplation or provision of tPA  Drip initiation (pressors, antiarrhythmics, heparin, etc.)  Antidotes given (narcan, charcoal, epi for anaphylaxis, etc..)  >=2L fluid bolus  >=3 Duonebs  >1 IV/IM doses of sedatives, antiepileptics, BP meds, rate control meds, adenosine. Procedures that are suggestive of critical care: chest tubes, cardioversion, BiPAP, IO, etc..    Critical care time is documented based on continuous or non-continuous provision of care that includes face-to-face time, placing orders, chart review, documentation, discussion with consultants, discussion with family. This time calculation is a best approximation and does not include time spent on CPR, EKG interpretation, central line placement, intubation, laceration repairs, and other separately billed procedures. Amount of Critical Care Time: 45minutes    Details of critical care provision is documented above. A general summary is listed below:    IMPENDING DETERIORATION -Cardiovascular and Metabolic  ASSOCIATED RISK FACTORS - Metabolic changes  MANAGEMENT- Bedside Assessment  INTERPRETATION -  CT Scan  INTERVENTIONS - hemodynamic mngmt and Metobolic interventions  CASE REVIEW - Hospitalist/Intensivist and Medical Sub-Specialist  TREATMENT RESPONSE -Stable  PERFORMED BY - Self    NOTES   :  During this entire length of time I was immediately available to the patient . Katheryn Martinez MD    Diagnosis     Clinical Impression:   1. Sepsis with acute renal failure without septic shock, due to unspecified organism, unspecified acute renal failure type ([address]    2. Ureterolithiasis        Attestations:    Katheryn Martinez MD    Please note that this dictation was completed with Mtime, the computer voice recognition software.   Quite often unanticipated grammatical, syntax, homophones, and other interpretive errors are inadvertently transcribed by the computer software. Please disregard these errors. Please excuse any errors that have escaped final proofreading. Thank you.

## 2021-12-06 NOTE — Clinical Note
TRANSFER - OUT REPORT:     Verbal report given to: Tamica (at bedside). Report consisted of patient's Situation, Background, Assessment and   Recommendations(SBAR). Opportunity for questions and clarification was provided. Patient transported with a Registered Nurse. Patient transported to: recovery.

## 2021-12-06 NOTE — ANESTHESIA PREPROCEDURE EVALUATION
Relevant Problems   No relevant active problems       Anesthetic History   No history of anesthetic complications            Review of Systems / Medical History  Patient summary reviewed, nursing notes reviewed and pertinent labs reviewed    Pulmonary  Within defined limits                 Neuro/Psych   Within defined limits           Cardiovascular    Hypertension              Exercise tolerance: >4 METS     GI/Hepatic/Renal         Renal disease: CRI and stones       Endo/Other    Diabetes: type 2  Hypothyroidism       Other Findings   Comments: Elevated hs-Trop (153)    Possible urosepsis         Physical Exam    Airway  Mallampati: II  TM Distance: 4 - 6 cm  Neck ROM: normal range of motion   Mouth opening: Normal     Cardiovascular    Rhythm: regular  Rate: normal         Dental  No notable dental hx       Pulmonary  Breath sounds clear to auscultation               Abdominal  GI exam deferred       Other Findings            Anesthetic Plan    ASA: 3, emergent  Anesthesia type: general          Induction: Intravenous  Anesthetic plan and risks discussed with: Patient

## 2021-12-06 NOTE — ED TRIAGE NOTES
Febrile, tachycardia, left lower abd/flank pain, N/V x3 days. Diaphoretic on ED arrival. Pt denies CP or SOB. SPO2 90 on RA, denies smoking hx. Placed on 4L NC. Hx DM, kidney stones, hypothyroid. Sent from [facility] for acute pylonephritis and FERMIN.

## 2021-12-06 NOTE — H&P
GENERAL GENERIC H&P/CONSULT  Mumtaz Steele MD  Any nursing and pharmacy questions should be to admitting hospitalist on call, which will be myself until 7pm    Subjective:    61M with past medical history of hypertension, diabetes, hypothyroidism, gout. Reports renal stone history, last episode about 4-5 years ago which he passed into a cup.    12/6/21 presents to hospital with left flank pains, development over the previous two days. Associated with fevers and chills. Associated with nausea and vomiting. During the ED course he underwent CT imaging. IMPRESSION  1. 5 x 5 mm obstructing stone in the distal left ureter. Left perinephric  inflammation. Correlate to medical renal disease to include renal dysfunction  and pyelonephritis. 2. Nonobstructing bilateral nephrolithiasis. 3. Thickening versus nondistention of the proximal duodenum. Correlate for any  clinical symptoms. 4. Trace bilateral pleural effusions. 5. Hepatomegaly. 6. Other findings above. Managed in the ED course with fluids and antibiotics. I have been asked to admit him to hospital for anticipated urologic surgery with Dr Kacy Arambula. Past Medical History:   Diagnosis Date    Diabetes ([facility] [address]     Gout     Hypertension     Hypothyroidism     Prostate cancer ([facility] [address]     Renal stones       Past Surgical History:   Procedure Laterality Date    HX PROSTATE SURGERY        Prior to Admission medications    Medication Sig Start Date End Date Taking? Authorizing Provider   allopurinoL (ZYLOPRIM) 300 mg tablet Take 300 mg by mouth daily. Yes Provider, Historical   metFORMIN (GLUCOPHAGE) 500 mg tablet Take 500 mg by mouth two (2) times daily (with meals). Yes Provider, Historical   levothyroxine sodium (LEVOTHYROXINE PO) Take 260 mcg by mouth Daily (before breakfast). Yes Provider, Historical   losartan (COZAAR) 25 mg tablet Take 25 mg by mouth daily.    Yes Provider, Historical     No Known Allergies   Social History     Tobacco Use    Smoking status: Never Smoker    Smokeless tobacco: Never Used   Substance Use Topics    Alcohol use: Not Currently      History reviewed. No pertinent family history. Review of Systems   All other systems reviewed and are negative. Objective:    12/06 0701 - 12/06 1900  In: 1000 [I.V.:1000]  Out: -   No intake/output data recorded. Patient Vitals for the past 8 hrs:   BP Temp Pulse Resp SpO2 Height Weight   12/06/21 1742 111/82 99.7 °F (37.6 °C) 88 18 96 % -- --   12/06/21 1600 129/76 -- 91 18 97 % -- --   12/06/21 1525 -- -- -- -- 96 % -- --   12/06/21 1440 118/70 (!) 102.9 °F (39.4 °C) (!) 122 22 90 % 6' 9\" (2.057 m) 107 kg (236 lb)     Physical Exam  Vitals reviewed. Constitutional:       Appearance: Normal appearance. HENT:      Head: Normocephalic and atraumatic. Nose: Nose normal.      Mouth/Throat:      Mouth: Mucous membranes are moist.   Eyes:      Extraocular Movements: Extraocular movements intact. Pupils: Pupils are equal, round, and reactive to light. Cardiovascular:      Rate and Rhythm: Normal rate. Pulses: Normal pulses. Heart sounds: Normal heart sounds. Pulmonary:      Effort: Pulmonary effort is normal.      Breath sounds: Normal breath sounds. Abdominal:      General: Abdomen is flat. Palpations: Abdomen is soft. Tenderness: There is left CVA tenderness. Musculoskeletal:         General: Normal range of motion. Cervical back: Normal range of motion and neck supple. Skin:     General: Skin is warm and dry. Neurological:      General: No focal deficit present. Mental Status: He is alert.    Psychiatric:         Mood and Affect: Mood normal.          Labs:    Recent Results (from the past 24 hour(s))   TROPONIN-HIGH SENSITIVITY    Collection Time: 12/06/21  3:00 PM   Result Value Ref Range    Troponin-High Sensitivity 156 (HH) 0 - 76 ng/L   CBC WITH AUTOMATED DIFF    Collection Time: 12/06/21  3:04 PM   Result Value Ref Range    WBC 2.2 (L) 4.1 - 11.1 K/uL    RBC 4.48 4.10 - 5.70 M/uL    HGB 13.4 12.1 - 17.0 g/dL    HCT 39.2 36.6 - 50.3 %    MCV 87.5 80.0 - 99.0 FL    MCH 29.9 26.0 - 34.0 PG    MCHC 34.2 30.0 - 36.5 g/dL    RDW 12.7 11.5 - 14.5 %    PLATELET 543 (L) 821 - 400 K/uL    NRBC 0.0 0.0  WBC    ABSOLUTE NRBC 0.00 0.00 - 0.01 K/uL    NEUTROPHILS PENDING %    LYMPHOCYTES PENDING %    MONOCYTES PENDING %    EOSINOPHILS PENDING %    BASOPHILS PENDING %    IMMATURE GRANULOCYTES PENDING %    ABS. NEUTROPHILS PENDING K/UL    ABS. LYMPHOCYTES PENDING K/UL    ABS. MONOCYTES PENDING K/UL    ABS. EOSINOPHILS PENDING K/UL    ABS. BASOPHILS PENDING K/UL    ABS. IMM. GRANS. PENDING K/UL    DF PENDING    METABOLIC PANEL, COMPREHENSIVE    Collection Time: 12/06/21  3:04 PM   Result Value Ref Range    Sodium 128 (L) 136 - 145 mmol/L    Potassium 4.0 3.5 - 5.1 mmol/L    Chloride 96 (L) 97 - 108 mmol/L    CO2 22 21 - 32 mmol/L    Anion gap 10 5 - 15 mmol/L    Glucose 143 (H) 65 - 100 mg/dL    BUN 40 (H) 6 - 20 mg/dL    Creatinine 2.19 (H) 0.70 - 1.30 mg/dL    BUN/Creatinine ratio 18 12 - 20      GFR est AA 37 (L) >60 ml/min/1.73m2    GFR est non-AA 31 (L) >60 ml/min/1.73m2    Calcium 8.5 8.5 - 10.1 mg/dL    Bilirubin, total 1.0 0.2 - 1.0 mg/dL    AST (SGOT) 31 15 - 37 U/L    ALT (SGPT) 49 12 - 78 U/L    Alk.  phosphatase 112 45 - 117 U/L    Protein, total 6.7 6.4 - 8.2 g/dL    Albumin 2.9 (L) 3.5 - 5.0 g/dL    Globulin 3.8 2.0 - 4.0 g/dL    A-G Ratio 0.8 (L) 1.1 - 2.2     LACTIC ACID    Collection Time: 12/06/21  3:04 PM   Result Value Ref Range    Lactic acid 2.8 (HH) 0.4 - 2.0 mmol/L   URINALYSIS W/ RFLX MICROSCOPIC    Collection Time: 12/06/21  3:08 PM   Result Value Ref Range    Color Dark Yellow      Appearance Turbid (A) Clear      Specific gravity 1.014 1.003 - 1.030      pH (UA) 5.0 5.0 - 8.0      Protein 100 (A) Negative mg/dL    Glucose Negative Negative mg/dL    Ketone Negative Negative mg/dL    Bilirubin Negative Negative      Blood Large (A) Negative      Urobilinogen 4.0 (H) 0.1 - 1.0 EU/dL    Nitrites Negative Negative      Leukocyte Esterase Negative Negative      WBC 20-50 0 - 4 /hpf    RBC 0-5 0 - 5 /hpf    Bacteria 1+ (A) Negative /hpf   URINE MICROSCOPIC    Collection Time: 12/06/21  3:08 PM   Result Value Ref Range    WBC 20-50 0 - 4 /hpf    RBC 0-5 0 - 5 /hpf    Bacteria 1+ (A) Negative /hpf   COVID-19 RAPID TEST    Collection Time: 12/06/21  3:30 PM   Result Value Ref Range    Specimen source Nasopharyngeal      COVID-19 rapid test Not Detected Not Detected     INFLUENZA A & B AG (RAPID TEST)    Collection Time: 12/06/21  3:30 PM   Result Value Ref Range    Influenza A Antigen Negative Negative      Influenza B Antigen Negative Negative     CK    Collection Time: 12/06/21  4:15 PM   Result Value Ref Range     (H) 39 - 308 U/L     Chest X-Ray: Chest single view.     Minor left lung base subsegmental atelectasis. No gross interstitial or alveolar  pulmonary edema. Cardiac and mediastinal structures magnified on this AP upright  view of the chest. No pneumothorax or sizable pleural effusion. Assessment:  Active Problems:    Pyelonephritis (12/6/2021)    61M with past medical history of hypertension, diabetes, hypothyroidism, gout. Reports renal stone history, last episode about 4-5 years ago which he passed into a cup.    12/6/21 presents to hospital with left flank pains, development over the previous two days. Associated with fevers and chills. Associated with nausea and vomiting. During the ED course he underwent CT imaging. IMPRESSION  1. 5 x 5 mm obstructing stone in the distal left ureter. Left perinephric  inflammation. Correlate to medical renal disease to include renal dysfunction  and pyelonephritis. 2. Nonobstructing bilateral nephrolithiasis. 3. Thickening versus nondistention of the proximal duodenum. Correlate for any  clinical symptoms. 4. Trace bilateral pleural effusions. 5. Hepatomegaly.   6. Other findings above. Managed in the ED course with fluids and antibiotics. I have been asked to admit him to hospital for anticipated urologic surgery with Dr Kacy oGnzalez. MICROBIOLOGY    12/6//21 Blood  Pending  12/6/21 Urine  Pending    Plan:    1) Left sided pyelonephritis with 5 x 5 mm obstructing stone in the distal left ureter.      Admit to hospital   Supportive care   Intravenous fluids   Ceftriaxone   Operative management anticipated per urology   Dilaudid as needed for pain    2) Hypertension     Continue home losartan    3) Hypothyroidism     Continue home levothyroxine    4) Diabetes     Hold metformin while in hospital   Sliding scale insulin while in hospital    5) DVT prophylaxis with lovenox    Signed:  Venice Leo MD 12/6/2021

## 2021-12-07 ENCOUNTER — APPOINTMENT (OUTPATIENT)
Dept: NON INVASIVE DIAGNOSTICS | Age: 61
DRG: 853 | End: 2021-12-07
Attending: HOSPITALIST
Payer: COMMERCIAL

## 2021-12-07 LAB
ALBUMIN SERPL-MCNC: 2.4 G/DL (ref 3.5–5)
ALBUMIN/GLOB SERPL: 0.7 {RATIO} (ref 1.1–2.2)
ALP SERPL-CCNC: 117 U/L (ref 45–117)
ALT SERPL-CCNC: 46 U/L (ref 12–78)
ANION GAP SERPL CALC-SCNC: 9 MMOL/L (ref 5–15)
APTT PPP: 28.7 SEC (ref 21.2–34.1)
AST SERPL W P-5'-P-CCNC: 52 U/L (ref 15–37)
ATRIAL RATE: 121 BPM
ATRIAL RATE: 85 BPM
BASOPHILS # BLD: 0 K/UL (ref 0–0.1)
BASOPHILS NFR BLD: 0 % (ref 0–1)
BILIRUB SERPL-MCNC: 0.8 MG/DL (ref 0.2–1)
BUN SERPL-MCNC: 49 MG/DL (ref 6–20)
BUN/CREAT SERPL: 16 (ref 12–20)
CA-I BLD-MCNC: 7.8 MG/DL (ref 8.5–10.1)
CALCULATED P AXIS, ECG09: 36 DEGREES
CALCULATED P AXIS, ECG09: 38 DEGREES
CALCULATED R AXIS, ECG10: -31 DEGREES
CALCULATED R AXIS, ECG10: -35 DEGREES
CALCULATED T AXIS, ECG11: 30 DEGREES
CALCULATED T AXIS, ECG11: 53 DEGREES
CHLORIDE SERPL-SCNC: 100 MMOL/L (ref 97–108)
CK MB CFR SERPL CALC: 0.4 %
CK MB CFR SERPL CALC: 0.5 %
CK MB CFR SERPL CALC: 0.7 %
CK MB SERPL-MCNC: 3.9 NG/ML (ref 5–25)
CK MB SERPL-MCNC: 5.2 NG/ML (ref 5–25)
CK MB SERPL-MCNC: 5.2 NG/ML (ref 5–25)
CK SERPL-CCNC: 523 NG/ML (ref 39–308)
CK SERPL-CCNC: 949 NG/ML (ref 39–308)
CO2 SERPL-SCNC: 22 MMOL/L (ref 21–32)
CREAT SERPL-MCNC: 2.99 MG/DL (ref 0.7–1.3)
DIAGNOSIS, 93000: NORMAL
DIAGNOSIS, 93000: NORMAL
DIFFERENTIAL METHOD BLD: ABNORMAL
ECHO AO ASC DIAM: 3.7 CM
ECHO AO ROOT DIAM: 3.7 CM
ECHO AV AREA PEAK VELOCITY: 2.8 CM2
ECHO AV AREA VTI: 3.19 CM2
ECHO AV AREA/BSA PEAK VELOCITY: 1.1 CM2/M2
ECHO AV AREA/BSA VTI: 1.3 CM2/M2
ECHO AV MEAN GRADIENT: 3 MMHG
ECHO AV MEAN VELOCITY: 80.6 CM/S
ECHO AV PEAK GRADIENT: 5 MMHG
ECHO AV PEAK VELOCITY: 112 CM/S
ECHO AV VTI: 21 CM
ECHO EST RA PRESSURE: 3 MMHG
ECHO LA AREA 2C: 18.1 CM2
ECHO LA AREA 4C: 23 CM2
ECHO LA MAJOR AXIS: 3.2 CM
ECHO LA MINOR AXIS: 1.29 CM
ECHO LV E' SEPTAL VELOCITY: 7.41 CM/S
ECHO LV EDV A2C: 72 CM3
ECHO LV EDV A4C: 97 CM3
ECHO LV ESV A2C: 99.9 CM3
ECHO LV ESV A4C: 52 CM3
ECHO LV INTERNAL DIMENSION DIASTOLIC: 5.82 CM (ref 4.2–5.9)
ECHO LV INTERNAL DIMENSION SYSTOLIC: 4.64 CM
ECHO LV IVSD: 0.96 CM (ref 0.6–1)
ECHO LV MASS 2D: 220.9 G (ref 88–224)
ECHO LV MASS INDEX 2D: 88.7 G/M2 (ref 49–115)
ECHO LV POSTERIOR WALL DIASTOLIC: 0.95 CM (ref 0.6–1)
ECHO LVOT DIAM: 2.4 CM
ECHO LVOT PEAK GRADIENT: 2 MMHG
ECHO LVOT PEAK VELOCITY: 69.3 CM/S
ECHO LVOT SV: 67 CM3
ECHO LVOT VTI: 14.8 CM
ECHO MV A VELOCITY: 65.9 CM/S
ECHO MV AREA PHT: 3.24 CM2
ECHO MV E VELOCITY: 92.9 CM/S
ECHO MV E/A RATIO: 1.41
ECHO MV E/E' SEPTAL: 12.54
ECHO MV MAX VELOCITY: 92.4 CM/S
ECHO MV MEAN GRADIENT: 1 MMHG
ECHO MV PEAK GRADIENT: 3 MMHG
ECHO MV PRESSURE HALF TIME (PHT): 68 MS
ECHO MV REGURGITANT VTIA: 129 CM
ECHO MV VTI: 43 CM
ECHO PV MAX VELOCITY: 62.8 CM/S
ECHO PV MEAN GRADIENT: 1 MMHG
ECHO PV PEAK INSTANTANEOUS GRADIENT SYSTOLIC: 2 MMHG
ECHO PV VTI: 12.9 CM
ECHO PVEIN PEAK D VELOCITY: 30.4 CM/S
ECHO PVEIN PEAK S VELOCITY: 28.2 CM/S
ECHO RA AREA 4C: 15.7 CM2
ECHO RIGHT VENTRICULAR SYSTOLIC PRESSURE (RVSP): 11 MMHG
ECHO RV TAPSE: 1.9 CM (ref 1.5–2)
ECHO RVOT DIAMETER: 2.2 CM
ECHO TV MAX VELOCITY: 143 CM/S
ECHO TV MEAN GRADIENT: 29 MMHG
ECHO TV REGURGITANT PEAK GRADIENT: 8 MMHG
EOSINOPHIL # BLD: 0 K/UL (ref 0–0.4)
EOSINOPHIL NFR BLD: 0 % (ref 0–7)
ERYTHROCYTE [DISTWIDTH] IN BLOOD BY AUTOMATED COUNT: 12.9 % (ref 11.5–14.5)
GLOBULIN SER CALC-MCNC: 3.3 G/DL (ref 2–4)
GLUCOSE BLD STRIP.AUTO-MCNC: 125 MG/DL (ref 65–117)
GLUCOSE BLD STRIP.AUTO-MCNC: 135 MG/DL (ref 65–117)
GLUCOSE BLD STRIP.AUTO-MCNC: 193 MG/DL (ref 65–117)
GLUCOSE BLD STRIP.AUTO-MCNC: 218 MG/DL (ref 65–117)
GLUCOSE SERPL-MCNC: 240 MG/DL (ref 65–100)
HCT VFR BLD AUTO: 35.5 % (ref 36.6–50.3)
HGB BLD-MCNC: 11.7 G/DL (ref 12.1–17)
IMM GRANULOCYTES # BLD AUTO: 0.1 K/UL (ref 0–0.04)
IMM GRANULOCYTES NFR BLD AUTO: 1 % (ref 0–0.5)
LA VOL DISK BP: 69 CM3 (ref 18–58)
LACTATE SERPL-SCNC: 1.6 MMOL/L (ref 0.4–2)
LVOT MG: 1 MMHG
LYMPHOCYTES # BLD: 0.4 K/UL (ref 0.8–3.5)
LYMPHOCYTES NFR BLD: 3 % (ref 12–49)
MAGNESIUM SERPL-MCNC: 2.3 MG/DL (ref 1.6–2.4)
MCH RBC QN AUTO: 29.7 PG (ref 26–34)
MCHC RBC AUTO-ENTMCNC: 33 G/DL (ref 30–36.5)
MCV RBC AUTO: 90.1 FL (ref 80–99)
MONOCYTES # BLD: 0.6 K/UL (ref 0–1)
MONOCYTES NFR BLD: 5 % (ref 5–13)
MV DEC SLOPE: 4070 MM/S2
MV DEC SLOPE: 4070 MM/S2
NEUTS SEG # BLD: 12.4 K/UL (ref 1.8–8)
NEUTS SEG NFR BLD: 91 % (ref 32–75)
NRBC # BLD: 0 K/UL (ref 0–0.01)
NRBC BLD-RTO: 0 PER 100 WBC
P-R INTERVAL, ECG05: 140 MS
P-R INTERVAL, ECG05: 152 MS
PERFORMED BY, TECHID: ABNORMAL
PLATELET # BLD AUTO: 100 K/UL (ref 150–400)
PMV BLD AUTO: 11.7 FL (ref 8.9–12.9)
POTASSIUM SERPL-SCNC: 4.1 MMOL/L (ref 3.5–5.1)
PROT SERPL-MCNC: 5.7 G/DL (ref 6.4–8.2)
Q-T INTERVAL, ECG07: 288 MS
Q-T INTERVAL, ECG07: 362 MS
QRS DURATION, ECG06: 100 MS
QRS DURATION, ECG06: 90 MS
QTC CALCULATION (BEZET), ECG08: 408 MS
QTC CALCULATION (BEZET), ECG08: 430 MS
RBC # BLD AUTO: 3.94 M/UL (ref 4.1–5.7)
SODIUM SERPL-SCNC: 131 MMOL/L (ref 136–145)
THERAPEUTIC RANGE,PTTT: NORMAL SEC (ref 82–109)
TROPONIN-HIGH SENSITIVITY: 376 NG/L (ref 0–76)
TSH SERPL DL<=0.05 MIU/L-ACNC: 0.93 UIU/ML (ref 0.36–3.74)
VENTRICULAR RATE, ECG03: 121 BPM
VENTRICULAR RATE, ECG03: 85 BPM
WBC # BLD AUTO: 13.5 K/UL (ref 4.1–11.1)

## 2021-12-07 PROCEDURE — 83605 ASSAY OF LACTIC ACID: CPT

## 2021-12-07 PROCEDURE — 99232 SBSQ HOSP IP/OBS MODERATE 35: CPT | Performed by: UROLOGY

## 2021-12-07 PROCEDURE — C9113 INJ PANTOPRAZOLE SODIUM, VIA: HCPCS | Performed by: NURSE PRACTITIONER

## 2021-12-07 PROCEDURE — 83036 HEMOGLOBIN GLYCOSYLATED A1C: CPT

## 2021-12-07 PROCEDURE — 36415 COLL VENOUS BLD VENIPUNCTURE: CPT

## 2021-12-07 PROCEDURE — 74011636637 HC RX REV CODE- 636/637: Performed by: INTERNAL MEDICINE

## 2021-12-07 PROCEDURE — 93306 TTE W/DOPPLER COMPLETE: CPT

## 2021-12-07 PROCEDURE — 85730 THROMBOPLASTIN TIME PARTIAL: CPT

## 2021-12-07 PROCEDURE — 74011250636 HC RX REV CODE- 250/636: Performed by: INTERNAL MEDICINE

## 2021-12-07 PROCEDURE — 74011250637 HC RX REV CODE- 250/637: Performed by: INTERNAL MEDICINE

## 2021-12-07 PROCEDURE — 74011000258 HC RX REV CODE- 258: Performed by: INTERNAL MEDICINE

## 2021-12-07 PROCEDURE — 65270000029 HC RM PRIVATE

## 2021-12-07 PROCEDURE — 85025 COMPLETE CBC W/AUTO DIFF WBC: CPT

## 2021-12-07 PROCEDURE — 83735 ASSAY OF MAGNESIUM: CPT

## 2021-12-07 PROCEDURE — 74011250636 HC RX REV CODE- 250/636: Performed by: HOSPITALIST

## 2021-12-07 PROCEDURE — 74011250637 HC RX REV CODE- 250/637: Performed by: HOSPITALIST

## 2021-12-07 PROCEDURE — 80053 COMPREHEN METABOLIC PANEL: CPT

## 2021-12-07 PROCEDURE — 84443 ASSAY THYROID STIM HORMONE: CPT

## 2021-12-07 PROCEDURE — 74011000258 HC RX REV CODE- 258: Performed by: NURSE PRACTITIONER

## 2021-12-07 PROCEDURE — 74011250637 HC RX REV CODE- 250/637: Performed by: ANESTHESIOLOGY

## 2021-12-07 PROCEDURE — 74011250636 HC RX REV CODE- 250/636: Performed by: NURSE PRACTITIONER

## 2021-12-07 PROCEDURE — 74011000250 HC RX REV CODE- 250: Performed by: NURSE PRACTITIONER

## 2021-12-07 PROCEDURE — 82550 ASSAY OF CK (CPK): CPT

## 2021-12-07 PROCEDURE — 84484 ASSAY OF TROPONIN QUANT: CPT

## 2021-12-07 PROCEDURE — 82962 GLUCOSE BLOOD TEST: CPT

## 2021-12-07 PROCEDURE — 82553 CREATINE MB FRACTION: CPT

## 2021-12-07 RX ORDER — CHLORPROMAZINE HYDROCHLORIDE 25 MG/1
25 TABLET, FILM COATED ORAL ONCE
Status: COMPLETED | OUTPATIENT
Start: 2021-12-07 | End: 2021-12-07

## 2021-12-07 RX ORDER — ATORVASTATIN CALCIUM 40 MG/1
80 TABLET, FILM COATED ORAL DAILY
Status: DISCONTINUED | OUTPATIENT
Start: 2021-12-08 | End: 2021-12-21 | Stop reason: HOSPADM

## 2021-12-07 RX ORDER — HEPARIN SODIUM 1000 [USP'U]/ML
2000 INJECTION, SOLUTION INTRAVENOUS; SUBCUTANEOUS AS NEEDED
Status: DISCONTINUED | OUTPATIENT
Start: 2021-12-07 | End: 2021-12-09

## 2021-12-07 RX ORDER — HEPARIN SODIUM 1000 [USP'U]/ML
4000 INJECTION, SOLUTION INTRAVENOUS; SUBCUTANEOUS AS NEEDED
Status: DISCONTINUED | OUTPATIENT
Start: 2021-12-07 | End: 2021-12-09

## 2021-12-07 RX ORDER — HEPARIN SODIUM 10000 [USP'U]/100ML
9.8-25 INJECTION, SOLUTION INTRAVENOUS
Status: DISCONTINUED | OUTPATIENT
Start: 2021-12-07 | End: 2021-12-09

## 2021-12-07 RX ORDER — HEPARIN SODIUM 1000 [USP'U]/ML
4000 INJECTION, SOLUTION INTRAVENOUS; SUBCUTANEOUS ONCE
Status: ACTIVE | OUTPATIENT
Start: 2021-12-07 | End: 2021-12-08

## 2021-12-07 RX ORDER — SODIUM CHLORIDE 9 MG/ML
100 INJECTION, SOLUTION INTRAVENOUS CONTINUOUS
Status: DISPENSED | OUTPATIENT
Start: 2021-12-07 | End: 2021-12-08

## 2021-12-07 RX ADMIN — Medication 10 ML: at 15:19

## 2021-12-07 RX ADMIN — HYDROCODONE BITARTRATE AND ACETAMINOPHEN 1 TABLET: 5; 325 TABLET ORAL at 11:31

## 2021-12-07 RX ADMIN — ASPIRIN 81 MG: 81 TABLET, COATED ORAL at 08:15

## 2021-12-07 RX ADMIN — HYDROMORPHONE HYDROCHLORIDE 0.5 MG: 1 INJECTION, SOLUTION INTRAMUSCULAR; INTRAVENOUS; SUBCUTANEOUS at 12:17

## 2021-12-07 RX ADMIN — ACETAMINOPHEN 650 MG: 325 TABLET ORAL at 02:54

## 2021-12-07 RX ADMIN — SODIUM CHLORIDE 40 MG: 9 INJECTION, SOLUTION INTRAMUSCULAR; INTRAVENOUS; SUBCUTANEOUS at 20:37

## 2021-12-07 RX ADMIN — SODIUM CHLORIDE 40 MG: 9 INJECTION, SOLUTION INTRAMUSCULAR; INTRAVENOUS; SUBCUTANEOUS at 11:06

## 2021-12-07 RX ADMIN — INSULIN LISPRO 3 UNITS: 100 INJECTION, SOLUTION INTRAVENOUS; SUBCUTANEOUS at 08:16

## 2021-12-07 RX ADMIN — HEPARIN SODIUM 9.8 UNITS/KG/HR: 10000 INJECTION, SOLUTION INTRAVENOUS at 23:40

## 2021-12-07 RX ADMIN — CHLORPROMAZINE HYDROCHLORIDE 25 MG: 25 TABLET, FILM COATED ORAL at 12:15

## 2021-12-07 RX ADMIN — Medication 10 ML: at 20:38

## 2021-12-07 RX ADMIN — ONDANSETRON 4 MG: 2 INJECTION INTRAMUSCULAR; INTRAVENOUS at 17:38

## 2021-12-07 RX ADMIN — ENOXAPARIN SODIUM 40 MG: 40 INJECTION SUBCUTANEOUS at 08:29

## 2021-12-07 RX ADMIN — SODIUM CHLORIDE 125 ML/HR: 9 INJECTION, SOLUTION INTRAVENOUS at 05:12

## 2021-12-07 RX ADMIN — Medication 20 ML: at 15:19

## 2021-12-07 RX ADMIN — Medication 10 ML: at 05:10

## 2021-12-07 RX ADMIN — ACETAMINOPHEN 650 MG: 325 TABLET ORAL at 08:14

## 2021-12-07 RX ADMIN — CEFTRIAXONE SODIUM 2 G: 2 INJECTION, POWDER, FOR SOLUTION INTRAMUSCULAR; INTRAVENOUS at 08:26

## 2021-12-07 RX ADMIN — SODIUM CHLORIDE 100 ML/HR: 9 INJECTION, SOLUTION INTRAVENOUS at 18:02

## 2021-12-07 RX ADMIN — INSULIN LISPRO 2 UNITS: 100 INJECTION, SOLUTION INTRAVENOUS; SUBCUTANEOUS at 11:18

## 2021-12-07 NOTE — PROGRESS NOTES
Hospitalist Progress Note               Daily Progress Note: 12/7/2021      Subjective: The patient is seen for follow up. He is a 57-year-old male with is of hypertension, diabetes and hypothyroidism who presented to the ED on 12/6 with left flank pain associated with fever and chills along with nausea and vomiting. CT showed a 5 mm obstructing stone in the distal left ureter with left perinephric inflammation. Patient did meet sepsis criteria. He was started on ceftriaxone. Patient was admitted to ICU. He underwent cystoscopy with left ureteral stenting on 12/6  -----  Patient seen for follow-up on 12/7. Feeling much better. Labs today show a creatinine of 2.99 up from 2.19 on admission    Troponin is 376, down from 873.   He was seen by cardiology who feels this represents a type II non-STEMI    Problem List:  Problem List as of 12/7/2021 Never Reviewed          Codes Class Noted - Resolved    Pyelonephritis ICD-10-CM: N12  ICD-9-CM: 590.80  12/6/2021 - Present              Medications reviewed  Current Facility-Administered Medications   Medication Dose Route Frequency    sodium chloride (NS) flush 5-40 mL  5-40 mL IntraVENous Q8H    sodium chloride (NS) flush 5-40 mL  5-40 mL IntraVENous PRN    acetaminophen (TYLENOL) tablet 650 mg  650 mg Oral Q6H PRN    Or    acetaminophen (TYLENOL) suppository 650 mg  650 mg Rectal Q6H PRN    polyethylene glycol (MIRALAX) packet 17 g  17 g Oral DAILY PRN    ondansetron (ZOFRAN ODT) tablet 4 mg  4 mg Oral Q8H PRN    Or    ondansetron (ZOFRAN) injection 4 mg  4 mg IntraVENous Q6H PRN    enoxaparin (LOVENOX) injection 40 mg  40 mg SubCUTAneous DAILY    HYDROmorphone (DILAUDID) syringe 0.5 mg  0.5 mg IntraVENous Q4H PRN    sodium chloride (NS) flush 5-40 mL  5-40 mL IntraVENous Q8H    sodium chloride (NS) flush 5-40 mL  5-40 mL IntraVENous PRN    HYDROcodone-acetaminophen (NORCO) 5-325 mg per tablet 1 Tablet  1 Tablet Oral PRN    fentaNYL citrate (PF) injection 50 mcg  50 mcg IntraVENous Multiple    HYDROmorphone (DILAUDID) syringe 0.4 mg  0.4 mg IntraVENous Multiple    ondansetron (ZOFRAN) injection 4 mg  4 mg IntraVENous PRN    midazolam (VERSED) injection 0.5 mg  0.5 mg IntraVENous Q5MIN PRN    ePHEDrine sulfate (AKOVAZ) 50 mg/mL injection 5 mg  5 mg IntraVENous PRN    cefTRIAXone (ROCEPHIN) 2 g in 0.9% sodium chloride (MBP/ADV) 50 mL MBP  2 g IntraVENous ACB    glucose chewable tablet 16 g  4 Tablet Oral PRN    dextrose (D50W) injection syrg 12.5-25 g  25-50 mL IntraVENous PRN    glucagon (GLUCAGEN) injection 1 mg  1 mg IntraMUSCular PRN    insulin lispro (HUMALOG) injection   SubCUTAneous AC&HS    pantoprazole (PROTONIX) 40 mg in 0.9% sodium chloride 10 mL injection  40 mg IntraVENous Q12H    aspirin delayed-release tablet 81 mg  81 mg Oral DAILY       Review of Systems:   A comprehensive review of systems was negative except for that written in the HPI. Objective:   Physical Exam:     Visit Vitals  /89   Pulse (!) 51   Temp 97.6 °F (36.4 °C)   Resp 18   Ht 6' 9\" (2.057 m)   Wt 107 kg (236 lb)   SpO2 99%   BMI 25.29 kg/m²    O2 Flow Rate (L/min): 3 l/min O2 Device: None (Room air)    Temp (24hrs), Av.7 °F (37.1 °C), Min:97 °F (36.1 °C), Max:102.9 °F (39.4 °C)    No intake/output data recorded.  1901 -  0700  In: 3607 [P.O.:720; I.V.:3200]  Out: 450 [Urine:450]    General:   Awake and alert   Lungs:   Clear to auscultation bilaterally. Chest wall:  No tenderness or deformity. Heart:  Regular rate and rhythm, S1, S2 normal, no murmur, click, rub or gallop. Abdomen:   Soft, non-tender. Bowel sounds normal. No masses,  No organomegaly. Extremities: Extremities normal, atraumatic, no cyanosis or edema. Pulses: 2+ and symmetric all extremities. Skin: Skin color, texture, turgor normal. No rashes or lesions   Neurologic: CNII-XII intact.   No gross focal deficits         Data Review:       Recent Days:  Recent Labs     12/07/21  0525 12/06/21  1504   WBC 13.5* 2.2*   HGB 11.7* 13.4   HCT 35.5* 39.2   * 116*     Recent Labs     12/07/21  0525 12/06/21  1504   * 128*   K 4.1 4.0    96*   CO2 22 22   * 143*   BUN 49* 40*   CREA 2.99* 2.19*   CA 7.8* 8.5   MG 2.3  --    ALB 2.4* 2.9*   TBILI 0.8 1.0   ALT 46 49     No results for input(s): PH, PCO2, PO2, HCO3, FIO2 in the last 72 hours. 24 Hour Results:  Recent Results (from the past 24 hour(s))   EKG, 12 LEAD, INITIAL    Collection Time: 12/06/21  2:38 PM   Result Value Ref Range    Ventricular Rate 121 BPM    Atrial Rate 121 BPM    P-R Interval 140 ms    QRS Duration 90 ms    Q-T Interval 288 ms    QTC Calculation (Bezet) 408 ms    Calculated P Axis 38 degrees    Calculated R Axis -31 degrees    Calculated T Axis 53 degrees    Diagnosis       Sinus tachycardia  Left axis deviation  Abnormal ECG  No previous ECGs available  Confirmed by Mirna Medina ([id number]) on 12/7/2021 8:59:09 AM     TROPONIN-HIGH SENSITIVITY    Collection Time: 12/06/21  3:00 PM   Result Value Ref Range    Troponin-High Sensitivity 156 (HH) 0 - 76 ng/L   CBC WITH AUTOMATED DIFF    Collection Time: 12/06/21  3:04 PM   Result Value Ref Range    WBC 2.2 (L) 4.1 - 11.1 K/uL    RBC 4.48 4.10 - 5.70 M/uL    HGB 13.4 12.1 - 17.0 g/dL    HCT 39.2 36.6 - 50.3 %    MCV 87.5 80.0 - 99.0 FL    MCH 29.9 26.0 - 34.0 PG    MCHC 34.2 30.0 - 36.5 g/dL    RDW 12.7 11.5 - 14.5 %    PLATELET 751 (L) 615 - 400 K/uL    NRBC 0.0 0.0  WBC    ABSOLUTE NRBC 0.00 0.00 - 0.01 K/uL    NEUTROPHILS 90 (H) 32 - 75 %    LYMPHOCYTES 6 (L) 12 - 49 %    MONOCYTES 0 (L) 5 - 13 %    EOSINOPHILS 4 0 - 7 %    BASOPHILS 0 0 - 1 %    IMMATURE GRANULOCYTES 0 %    ABS. NEUTROPHILS 2.0 1.8 - 8.0 K/UL    ABS. LYMPHOCYTES 0.1 (L) 0.8 - 3.5 K/UL    ABS. MONOCYTES 0.0 0.0 - 1.0 K/UL    ABS. EOSINOPHILS 0.1 0.0 - 0.4 K/UL    ABS. BASOPHILS 0.0 0.0 - 0.1 K/UL    ABS. IMM.  GRANS. 0.0 K/UL    DF Manual      RBC COMMENTS Normocytic, Normochromic     METABOLIC PANEL, COMPREHENSIVE    Collection Time: 12/06/21  3:04 PM   Result Value Ref Range    Sodium 128 (L) 136 - 145 mmol/L    Potassium 4.0 3.5 - 5.1 mmol/L    Chloride 96 (L) 97 - 108 mmol/L    CO2 22 21 - 32 mmol/L    Anion gap 10 5 - 15 mmol/L    Glucose 143 (H) 65 - 100 mg/dL    BUN 40 (H) 6 - 20 mg/dL    Creatinine 2.19 (H) 0.70 - 1.30 mg/dL    BUN/Creatinine ratio 18 12 - 20      GFR est AA 37 (L) >60 ml/min/1.73m2    GFR est non-AA 31 (L) >60 ml/min/1.73m2    Calcium 8.5 8.5 - 10.1 mg/dL    Bilirubin, total 1.0 0.2 - 1.0 mg/dL    AST (SGOT) 31 15 - 37 U/L    ALT (SGPT) 49 12 - 78 U/L    Alk.  phosphatase 112 45 - 117 U/L    Protein, total 6.7 6.4 - 8.2 g/dL    Albumin 2.9 (L) 3.5 - 5.0 g/dL    Globulin 3.8 2.0 - 4.0 g/dL    A-G Ratio 0.8 (L) 1.1 - 2.2     LACTIC ACID    Collection Time: 12/06/21  3:04 PM   Result Value Ref Range    Lactic acid 2.8 (HH) 0.4 - 2.0 mmol/L   URINALYSIS W/ RFLX MICROSCOPIC    Collection Time: 12/06/21  3:08 PM   Result Value Ref Range    Color Dark Yellow      Appearance Turbid (A) Clear      Specific gravity 1.014 1.003 - 1.030      pH (UA) 5.0 5.0 - 8.0      Protein 100 (A) Negative mg/dL    Glucose Negative Negative mg/dL    Ketone Negative Negative mg/dL    Bilirubin Negative Negative      Blood Large (A) Negative      Urobilinogen 4.0 (H) 0.1 - 1.0 EU/dL    Nitrites Negative Negative      Leukocyte Esterase Negative Negative      WBC 20-50 0 - 4 /hpf    RBC 0-5 0 - 5 /hpf    Bacteria 1+ (A) Negative /hpf   URINE MICROSCOPIC    Collection Time: 12/06/21  3:08 PM   Result Value Ref Range    WBC 20-50 0 - 4 /hpf    RBC 0-5 0 - 5 /hpf    Bacteria 1+ (A) Negative /hpf   COVID-19 RAPID TEST    Collection Time: 12/06/21  3:30 PM   Result Value Ref Range    Specimen source Nasopharyngeal      COVID-19 rapid test Not Detected Not Detected     INFLUENZA A & B AG (RAPID TEST)    Collection Time: 12/06/21  3:30 PM   Result Value Ref Range    Influenza A Antigen Negative Negative      Influenza B Antigen Negative Negative     CK    Collection Time: 12/06/21  4:15 PM   Result Value Ref Range     (H) 39 - 308 U/L   LACTIC ACID    Collection Time: 12/06/21  5:05 PM   Result Value Ref Range    Lactic acid 1.3 0.4 - 2.0 mmol/L   TROPONIN-HIGH SENSITIVITY    Collection Time: 12/06/21  5:50 PM   Result Value Ref Range    Troponin-High Sensitivity 1,138 (HH) 0 - 76 ng/L   GLUCOSE, POC    Collection Time: 12/06/21  7:42 PM   Result Value Ref Range    Glucose (POC) 154 (H) 65 - 117 mg/dL    Performed by Duglas Toney    TROPONIN-HIGH SENSITIVITY    Collection Time: 12/06/21  9:40 PM   Result Value Ref Range    Troponin-High Sensitivity 873 (HH) 0 - 76 ng/L   CK W/ REFLX CKMB    Collection Time: 12/06/21  9:40 PM   Result Value Ref Range    CK 1,255.0 (H) 39 - 308 ng/mL   PROCALCITONIN    Collection Time: 12/06/21  9:40 PM   Result Value Ref Range    Procalcitonin 173.45 (H) 0 ng/mL   CK-MB,QUANT. Collection Time: 12/06/21  9:40 PM   Result Value Ref Range    CK - MB 5.2 (H) <3.6 ng/mL    CK-MB Index 0.4     METABOLIC PANEL, COMPREHENSIVE    Collection Time: 12/07/21  5:25 AM   Result Value Ref Range    Sodium 131 (L) 136 - 145 mmol/L    Potassium 4.1 3.5 - 5.1 mmol/L    Chloride 100 97 - 108 mmol/L    CO2 22 21 - 32 mmol/L    Anion gap 9 5 - 15 mmol/L    Glucose 240 (H) 65 - 100 mg/dL    BUN 49 (H) 6 - 20 mg/dL    Creatinine 2.99 (H) 0.70 - 1.30 mg/dL    BUN/Creatinine ratio 16 12 - 20      GFR est AA 26 (L) >60 ml/min/1.73m2    GFR est non-AA 21 (L) >60 ml/min/1.73m2    Calcium 7.8 (L) 8.5 - 10.1 mg/dL    Bilirubin, total 0.8 0.2 - 1.0 mg/dL    AST (SGOT) 52 (H) 15 - 37 U/L    ALT (SGPT) 46 12 - 78 U/L    Alk.  phosphatase 117 45 - 117 U/L    Protein, total 5.7 (L) 6.4 - 8.2 g/dL    Albumin 2.4 (L) 3.5 - 5.0 g/dL    Globulin 3.3 2.0 - 4.0 g/dL    A-G Ratio 0.7 (L) 1.1 - 2.2     MAGNESIUM    Collection Time: 12/07/21  5:25 AM   Result Value Ref Range Magnesium 2.3 1.6 - 2.4 mg/dL   CBC WITH AUTOMATED DIFF    Collection Time: 12/07/21  5:25 AM   Result Value Ref Range    WBC 13.5 (H) 4.1 - 11.1 K/uL    RBC 3.94 (L) 4.10 - 5.70 M/uL    HGB 11.7 (L) 12.1 - 17.0 g/dL    HCT 35.5 (L) 36.6 - 50.3 %    MCV 90.1 80.0 - 99.0 FL    MCH 29.7 26.0 - 34.0 PG    MCHC 33.0 30.0 - 36.5 g/dL    RDW 12.9 11.5 - 14.5 %    PLATELET 780 (L) 075 - 400 K/uL    MPV 11.7 8.9 - 12.9 FL    NRBC 0.0 0.0  WBC    ABSOLUTE NRBC 0.00 0.00 - 0.01 K/uL    NEUTROPHILS 91 (H) 32 - 75 %    LYMPHOCYTES 3 (L) 12 - 49 %    MONOCYTES 5 5 - 13 %    EOSINOPHILS 0 0 - 7 %    BASOPHILS 0 0 - 1 %    IMMATURE GRANULOCYTES 1 (H) 0 - 0.5 %    ABS. NEUTROPHILS 12.4 (H) 1.8 - 8.0 K/UL    ABS. LYMPHOCYTES 0.4 (L) 0.8 - 3.5 K/UL    ABS. MONOCYTES 0.6 0.0 - 1.0 K/UL    ABS. EOSINOPHILS 0.0 0.0 - 0.4 K/UL    ABS. BASOPHILS 0.0 0.0 - 0.1 K/UL    ABS. IMM. GRANS. 0.1 (H) 0.00 - 0.04 K/UL    DF AUTOMATED     TROPONIN-HIGH SENSITIVITY    Collection Time: 12/07/21  5:25 AM   Result Value Ref Range    Troponin-High Sensitivity 376 (HH) 0 - 76 ng/L   CK W/ REFLX CKMB    Collection Time: 12/07/21  5:25 AM   Result Value Ref Range    .0 (H) 39 - 308 ng/mL   LACTIC ACID    Collection Time: 12/07/21  5:25 AM   Result Value Ref Range    Lactic acid 1.6 0.4 - 2.0 mmol/L   TSH 3RD GENERATION    Collection Time: 12/07/21  5:25 AM   Result Value Ref Range    TSH 0.93 0.36 - 3.74 uIU/mL   CK-MB,QUANT.     Collection Time: 12/07/21  5:25 AM   Result Value Ref Range    CK - MB 5.2 (H) <3.6 ng/mL    CK-MB Index 0.5     GLUCOSE, POC    Collection Time: 12/07/21  8:05 AM   Result Value Ref Range    Glucose (POC) 218 (H) 65 - 117 mg/dL    Performed by Varun Dry    ECHO ADULT COMPLETE    Collection Time: 12/07/21  8:59 AM   Result Value Ref Range    LV ED Vol A2C 72.00 cm3    LV ED Vol A4C 97.00 cm3    LV ED Vol A2C 197.00 cm3    LV ES Vol A4C 52.00 cm3    LV ES Vol A2C 99.90 cm3    IVSd 0.96 0.6 - 1.0 cm    LVIDd 5.82 4.2 - 5.9 cm    LVIDs 4.64 cm    LVOT d 2.40 cm    Left Ventricular Outflow Tract Mean Gradient 1.00 mmHg    LVOT VTI 14.80 cm    LVOT VTI 14.80 cm    LVOT Peak Velocity 69.30 cm/s    LVOT Peak Gradient 2.00 mmHg    LVPWd 0.95 0.6 - 1.0 cm    LV E' Septal Velocity 7.41 cm/s    E/E' lateral 21.20     E/E' septal 12.50     Left Ventricular Outflow Tract Cross Section Area 4.52 cm2    LVOT SV 67.0 cm3    Left Atrium Minor Axis 5.53 cm    Left Atrium Major Axis 5.57 cm    LA Area 2C 18.10 cm2    LA Area 4C 23.00 cm2    LA Volume DISK BP 69.00 18 - 58 cm3    Left Atrium Major Axis 3.20 cm    Left Atrium Major Axis 3.20 cm    LA Vol Index 27.70 16 - 28 ml/m2    Aortic Valve Systolic Mean Gradient 0.09 mmHg    AoV VTI 21.00 cm    Aortic valve mean velocity 80.60 cm/s    Aortic Valve Systolic Peak Velocity 643.02 cm/s    AoV PG 5.00 mmHg    Aortic Valve Area by Continuity of VTI 3.19 cm2    Aortic Valve Area by Continuity of Peak Velocity 2.80 cm2    Ao Root D 3.70 cm    AO ASC D 3.70 cm    TV MG 29.00 mmHg    Mitral Regurgitant Velocity Time Integral 129.00 cm    MV Mean Gradient 1.00 mmHg    Mitral Valve Annulus Velocity Time Integral 43.00 cm    MR Peak Gradient 17.00 mmHg    Mitral Valve Max Velocity 92.40 cm/s    MV Peak Gradient 3.00 mmHg    Mitral Valve Deceleration Morton 4,070.00 mm/s2    Mitral Valve Deceleration Morton 4,070.00 mm/s2    Mitral Valve Pressure Half-time 68.00 ms    MV A Tomás 65.90 cm/s    MV E Tomás 92.90 cm/s    MVA (PHT) 3.24 cm2    MV E/A 1.40     Pulmonic Valve Systolic Mean Gradient 4.41 mmHg    Pulmonic Valve Systolic Velocity Time Integral 12.90 cm    Pulmonic Valve Max Velocity 62.80 cm/s    Pulmonic Valve Systolic Peak Instantaneous Gradient 2.00 mmHg    Pulm Vein Peak D Velocity 30.40 cm/s    Pulm Vein Peak S Velocity 28.20 cm/s    RVOT Diameter 2.20 cm    Tricuspid Valve Max Velocity 143.00 cm/s    Triscuspid Valve Regurgitation Peak Gradient 8.00 mmHg    Tapse 1.90 1.5 - 2.0 cm    Right Atrial Area 4C 15.70 cm2       XR FLUOROSCOPY UNDER 60 MINUTES   Final Result      CT ABD PELV WO CONT   Final Result   1. 5 x 5 mm obstructing stone in the distal left ureter. Left perinephric   inflammation. Correlate to medical renal disease to include renal dysfunction   and pyelonephritis. 2. Nonobstructing bilateral nephrolithiasis. 3. Thickening versus nondistention of the proximal duodenum. Correlate for any   clinical symptoms. 4. Trace bilateral pleural effusions. 5. Hepatomegaly. 6. Other findings above. XR CHEST PORT   Final Result           Assessment:  Complicated UTI/pyelonephritis    Sepsis due to above    Acute kidney injury, likely combined postobstructive and ATN from sepsis    Obstructing left ureter stone, status post cystoscopy with stenting    Type II non-STEMI    Diabetes mellitus type 2    Hypertension    Hypothyroidism          Plan:  Continue IV fluids  Await urine culture  Nephrology has been consulted  Recheck labs in a.m. Care Plan discussed with: Patient/Family    Disposition: Transfer to med telemetry    Total time spent with patient: 30 minutes.     Natalia Klein MD

## 2021-12-07 NOTE — PROGRESS NOTES
Problem: Falls - Risk of  Goal: *Absence of Falls  Description: Document Glenn Feliciano Fall Risk and appropriate interventions in the flowsheet.   Outcome: Progressing Towards Goal  Note: Fall Risk Interventions:            Medication Interventions: Bed/chair exit alarm    Elimination Interventions: Call light in reach, Bed/chair exit alarm, Toileting schedule/hourly rounds

## 2021-12-07 NOTE — ANESTHESIA POSTPROCEDURE EVALUATION
Procedure(s):  Cystoscopy, left retrograde pyelogram   ureteral stent placement, direct vision internal urethrotomy. general    Anesthesia Post Evaluation      Multimodal analgesia: multimodal analgesia used between 6 hours prior to anesthesia start to PACU discharge  Patient location during evaluation: PACU  Patient participation: complete - patient participated  Level of consciousness: awake  Pain score: 0  Pain management: adequate  Airway patency: patent  Anesthetic complications: no  Cardiovascular status: acceptable  Respiratory status: acceptable  Hydration status: acceptable  Comments: Pt had markedly elevated high sensitivity troponin (153-->1000) which was drawn prior to procedure. Pt had no symptoms of ACS preop. He currently denies any chest pain or dyspnea. Respirations are not labored. He denies h/o respiratory disease. Baseline spo2 was 86% when he arrived in the OR on 4LO2 via nasal cannula. D/w Dr. Roselia Hernadez and hospitalist service who will admit to ICU for further workup.   Post anesthesia nausea and vomiting:  controlled  Final Post Anesthesia Temperature Assessment:  Normothermia (36.0-37.5 degrees C)      INITIAL Post-op Vital signs:   Vitals Value Taken Time   /68 12/06/21 1900   Temp 36.1 °C (97 °F) 12/06/21 1900   Pulse 89 12/06/21 1900   Resp 13 12/06/21 1900   SpO2 97 % 12/06/21 1900

## 2021-12-07 NOTE — CONSULTS
Consult    Patient: Deng Chauhan MRN: [id number]  SSN: [id number]    YOB: 1960  Age: 59 y.o. Sex: male       Subjective:      Date of  Admission: 12/6/2021     Admission type: Emergency    Deng Chauhan is a 59 y.o. male with a history of diabetes mellitus, hypertension, hyperlipidemia but no prior cardiac history. He was admitted due to urosepsis, pyelonephritis and underwent urgent ureteral stenting yesterday. Postprocedure troponins were significantly elevated hence we are asked to see him. He has no prior cardiac history. Since yesterday he has had no chest pain, worsening dyspnea on exertion, orthopnea, PND, dizziness, lightheadedness or any other complaints. He does admit to having a couple of episodes of chest pain described as a pressure-like sensation about 2 months ago. The symptoms improved after he took [place] and he did not think anything more of it. He appears comfortable and has no complaints when seen by me. Primary Care Provider: Unknown, Provider, MD  Past Medical History:   Diagnosis Date    Diabetes ([facility] [address]     Gout     Hypertension     Hypothyroidism     Prostate cancer ([facility] [address]     Renal stones       Past Surgical History:   Procedure Laterality Date    HX PROSTATE SURGERY       History reviewed. No pertinent family history.    Social History     Tobacco Use    Smoking status: Never Smoker    Smokeless tobacco: Never Used   Substance Use Topics    Alcohol use: Not Currently      Current Facility-Administered Medications   Medication Dose Route Frequency    sodium chloride (NS) flush 5-40 mL  5-40 mL IntraVENous Q8H    sodium chloride (NS) flush 5-40 mL  5-40 mL IntraVENous PRN    acetaminophen (TYLENOL) tablet 650 mg  650 mg Oral Q6H PRN    Or    acetaminophen (TYLENOL) suppository 650 mg  650 mg Rectal Q6H PRN    polyethylene glycol (MIRALAX) packet 17 g  17 g Oral DAILY PRN    ondansetron (ZOFRAN ODT) tablet 4 mg  4 mg Oral Q8H PRN    Or    ondansetron (ZOFRAN) injection 4 mg  4 mg IntraVENous Q6H PRN    enoxaparin (LOVENOX) injection 40 mg  40 mg SubCUTAneous DAILY    HYDROmorphone (DILAUDID) syringe 0.5 mg  0.5 mg IntraVENous Q4H PRN    sodium chloride (NS) flush 5-40 mL  5-40 mL IntraVENous Q8H    sodium chloride (NS) flush 5-40 mL  5-40 mL IntraVENous PRN    HYDROcodone-acetaminophen (NORCO) 5-325 mg per tablet 1 Tablet  1 Tablet Oral PRN    fentaNYL citrate (PF) injection 50 mcg  50 mcg IntraVENous Multiple    HYDROmorphone (DILAUDID) syringe 0.4 mg  0.4 mg IntraVENous Multiple    ondansetron (ZOFRAN) injection 4 mg  4 mg IntraVENous PRN    midazolam (VERSED) injection 0.5 mg  0.5 mg IntraVENous Q5MIN PRN    ePHEDrine sulfate (AKOVAZ) 50 mg/mL injection 5 mg  5 mg IntraVENous PRN    cefTRIAXone (ROCEPHIN) 2 g in 0.9% sodium chloride (MBP/ADV) 50 mL MBP  2 g IntraVENous ACB    glucose chewable tablet 16 g  4 Tablet Oral PRN    dextrose (D50W) injection syrg 12.5-25 g  25-50 mL IntraVENous PRN    glucagon (GLUCAGEN) injection 1 mg  1 mg IntraMUSCular PRN    insulin lispro (HUMALOG) injection   SubCUTAneous AC&HS    pantoprazole (PROTONIX) 40 mg in 0.9% sodium chloride 10 mL injection  40 mg IntraVENous Q12H    aspirin delayed-release tablet 81 mg  81 mg Oral DAILY        No Known Allergies     Review of Systems:  A comprehensive review of systems was negative except for that written in the History of Present Illness.        Subjective:     Visit Vitals  /83 (BP 1 Location: Left upper arm, BP Patient Position: At rest)   Pulse (!) 52   Temp 97.6 °F (36.4 °C)   Resp 18   Ht 6' 9\" (2.057 m)   Wt 107 kg (236 lb)   SpO2 97%   BMI 25.29 kg/m²        Physical Exam:  Visit Vitals  /83 (BP 1 Location: Left upper arm, BP Patient Position: At rest)   Pulse (!) 52   Temp 97.6 °F (36.4 °C)   Resp 18   Ht 6' 9\" (2.057 m)   Wt 107 kg (236 lb)   SpO2 97%   BMI 25.29 kg/m²     General Appearance:  Well developed, well nourished,alert and oriented x 3, and individual in no acute distress. Ears/Nose/Mouth/Throat:   Hearing grossly normal.         Neck: Supple. Chest:   Lungs clear to auscultation bilaterally. Cardiovascular:  Regular rate and rhythm, S1, S2 normal, no murmur. Abdomen:   Soft, non-tender, bowel sounds are active. Extremities: No edema bilaterally. Skin: Warm and dry.                Cardiographics:  Telemetry: normal sinus rhythm  ECG: normal EKG, normal sinus rhythm    Data Reviewed:   BMP:   Lab Results   Component Value Date/Time     (L) 12/07/2021 05:25 AM    K 4.1 12/07/2021 05:25 AM     12/07/2021 05:25 AM    CO2 22 12/07/2021 05:25 AM    AGAP 9 12/07/2021 05:25 AM     (H) 12/07/2021 05:25 AM    BUN 49 (H) 12/07/2021 05:25 AM    CREA 2.99 (H) 12/07/2021 05:25 AM    GFRAA 26 (L) 12/07/2021 05:25 AM    GFRNA 21 (L) 12/07/2021 05:25 AM     CMP:   Lab Results   Component Value Date/Time     (L) 12/07/2021 05:25 AM    K 4.1 12/07/2021 05:25 AM     12/07/2021 05:25 AM    CO2 22 12/07/2021 05:25 AM    AGAP 9 12/07/2021 05:25 AM     (H) 12/07/2021 05:25 AM    BUN 49 (H) 12/07/2021 05:25 AM    CREA 2.99 (H) 12/07/2021 05:25 AM    GFRAA 26 (L) 12/07/2021 05:25 AM    GFRNA 21 (L) 12/07/2021 05:25 AM    CA 7.8 (L) 12/07/2021 05:25 AM    MG 2.3 12/07/2021 05:25 AM    ALB 2.4 (L) 12/07/2021 05:25 AM    TP 5.7 (L) 12/07/2021 05:25 AM    GLOB 3.3 12/07/2021 05:25 AM    AGRAT 0.7 (L) 12/07/2021 05:25 AM    ALT 46 12/07/2021 05:25 AM     CBC:   Lab Results   Component Value Date/Time    WBC 13.5 (H) 12/07/2021 05:25 AM    HGB 11.7 (L) 12/07/2021 05:25 AM    HCT 35.5 (L) 12/07/2021 05:25 AM     (L) 12/07/2021 05:25 AM     All Cardiac Markers in the last 24 hours:   Lab Results   Component Value Date/Time     (H) 12/06/2021 04:15 PM    CKMB 5.2 (H) 12/07/2021 05:25 AM    CKMB 5.2 (H) 12/06/2021 09:40 PM    CKNDX 0.5 12/07/2021 05:25 AM    CKNDX 0.4 12/06/2021 09:40 PM     Recent Glucose Results:   Lab Results   Component Value Date/Time     (H) 12/07/2021 05:25 AM     ABG: No results found for: PH, PHI, PCO2, PCO2I, PO2, PO2I, HCO3, HCO3I, FIO2, FIO2I  COAGS: No results found for: APTT, PTP, INR, INREXT, INREXT  Liver Panel:   Lab Results   Component Value Date/Time    ALB 2.4 (L) 12/07/2021 05:25 AM    TP 5.7 (L) 12/07/2021 05:25 AM    GLOB 3.3 12/07/2021 05:25 AM    AGRAT 0.7 (L) 12/07/2021 05:25 AM    ALT 46 12/07/2021 05:25 AM     12/07/2021 05:25 AM        Assessment:   NSTEMI type II  Pyelonephritis status post ureteral stent  Acute kidney injury  Diabetes mellitus  Hypertension  Hyperlipidemia     Plan:   70-year-old male with a past medical history as above who is seen for elevated troponins after undergoing ureteral stent placement.  -Patient has had no clear anginal symptoms but he has significantly elevated troponins which are now downtrending. I reviewed his transthoracic echocardiogram which shows mild LV dysfunction with lateral, anterolateral, inferolateral wall motion abnormality consistent with coronary artery disease in the left circumflex/right coronary artery territory  -His creatinine today is 2.9 and nephrology evaluation is pending  -There is no clear indication to start heparin since he has had no cardiac symptoms however with clear rise and fall in troponin, I will start him on a heparin infusion. I discussed with Dr. Angel [facility] and there are no contraindications to this from the urologic standpoint  -Continue aspirin, high intensity statin. Add beta-blockers as hemodynamically able  -Once his presenting problems have stabilized and renal status has been optimized, he will need cardiac catheterization for further evaluation  -We will continue to follow the patient and give further recommendations pending his clinical course.     Thank you for allowing us to participate in the care of your patient

## 2021-12-07 NOTE — PROGRESS NOTES
UROLOGY Progress Note          [phone]      Daily Progress Note: 12/7/2021      Subjective: The patient is seen for UROLOGIC follow  up. S/p ureteral stent for sepsis  No pain. Feeling better. Bradycardia noted to 45 overnight. SR 60 now.      Problem List:  Patient Active Problem List   Diagnosis Code    Pyelonephritis N12         Medications reviewed  Current Facility-Administered Medications   Medication Dose Route Frequency    sodium chloride (NS) flush 5-40 mL  5-40 mL IntraVENous Q8H    sodium chloride (NS) flush 5-40 mL  5-40 mL IntraVENous PRN    acetaminophen (TYLENOL) tablet 650 mg  650 mg Oral Q6H PRN    Or    acetaminophen (TYLENOL) suppository 650 mg  650 mg Rectal Q6H PRN    polyethylene glycol (MIRALAX) packet 17 g  17 g Oral DAILY PRN    ondansetron (ZOFRAN ODT) tablet 4 mg  4 mg Oral Q8H PRN    Or    ondansetron (ZOFRAN) injection 4 mg  4 mg IntraVENous Q6H PRN    enoxaparin (LOVENOX) injection 40 mg  40 mg SubCUTAneous DAILY    HYDROmorphone (DILAUDID) syringe 0.5 mg  0.5 mg IntraVENous Q4H PRN    sodium chloride (NS) flush 5-40 mL  5-40 mL IntraVENous Q8H    sodium chloride (NS) flush 5-40 mL  5-40 mL IntraVENous PRN    HYDROcodone-acetaminophen (NORCO) 5-325 mg per tablet 1 Tablet  1 Tablet Oral PRN    fentaNYL citrate (PF) injection 50 mcg  50 mcg IntraVENous Multiple    HYDROmorphone (DILAUDID) syringe 0.4 mg  0.4 mg IntraVENous Multiple    ondansetron (ZOFRAN) injection 4 mg  4 mg IntraVENous PRN    midazolam (VERSED) injection 0.5 mg  0.5 mg IntraVENous Q5MIN PRN    ePHEDrine sulfate (AKOVAZ) 50 mg/mL injection 5 mg  5 mg IntraVENous PRN    0.9% sodium chloride infusion  125 mL/hr IntraVENous CONTINUOUS    cefTRIAXone (ROCEPHIN) 2 g in 0.9% sodium chloride (MBP/ADV) 50 mL MBP  2 g IntraVENous ACB    glucose chewable tablet 16 g  4 Tablet Oral PRN    dextrose (D50W) injection syrg 12.5-25 g  25-50 mL IntraVENous PRN    glucagon (GLUCAGEN) injection 1 mg  1 mg IntraMUSCular PRN    insulin lispro (HUMALOG) injection   SubCUTAneous AC&HS    pantoprazole (PROTONIX) 40 mg in 0.9% sodium chloride 10 mL injection  40 mg IntraVENous Q12H    aspirin delayed-release tablet 81 mg  81 mg Oral DAILY       Review of Systems:   Review of Systems   All other systems reviewed and are negative. Objective:   Physical Exam  Constitutional:       Appearance: Normal appearance. HENT:      Head: Normocephalic and atraumatic. Eyes:      Extraocular Movements: Extraocular movements intact. Cardiovascular:      Rate and Rhythm: Normal rate and regular rhythm. Pulmonary:      Effort: Pulmonary effort is normal. No respiratory distress. Breath sounds: No stridor. No wheezing or rhonchi. Abdominal:      General: There is no distension. Palpations: Abdomen is soft. Tenderness: There is no abdominal tenderness. Genitourinary:     Comments: Berry with yellow urine, cloudy sediment  Musculoskeletal:      Cervical back: Normal range of motion. No rigidity. Skin:     General: Skin is warm and dry. Neurological:      General: No focal deficit present. Mental Status: He is alert and oriented to person, place, and time.    Psychiatric:         Mood and Affect: Mood normal.         Behavior: Behavior normal.          Visit Vitals  /89   Pulse (!) 51   Temp 97.6 °F (36.4 °C)   Resp 18   Ht 6' 9\" (2.057 m)   Wt 236 lb (107 kg)   SpO2 99%   BMI 25.29 kg/m²         Data Review:       Recent Days:  Recent Labs     12/06/21  1504   WBC 2.2*   HGB 13.4   HCT 39.2   *     Recent Labs     12/07/21  0525 12/06/21  1504   * 128*   K 4.1 4.0    96*   CO2 22 22   * 143*   BUN 49* 40*   CREA 2.99* 2.19*   CA 7.8* 8.5   MG 2.3  --    ALB 2.4* 2.9*   TBILI 0.8 1.0   ALT 46 49               Assessment/     Patient Active Problem List   Diagnosis Code    Pyelonephritis N12   Pyelonephritis/ sepsis - on ceftriaxone  FERMIN  Await culture results  Kidney stone  Some bradycardia overnight. Cardiology following.           Richmond Shaw MD

## 2021-12-07 NOTE — CONSULTS
Renal Consult Note    Admit Date: 12/6/2021      HPI:   67-year-old  gentleman who has past renal stones in the past.  He is known to have hypertension diabetes hypothyroidism and gout. He is not aware of any underlying renal disease. He presented to an [facility] emergency room with left-sided abdominal pain and vomiting on and off for the last 4 days. He had fever and chills over the weekend. His creatinine on admission was 2.99 mg.  T-max was 102.9. He underwent cystoscopy, left ureteral stent placement and internal urethrotomy for urethral stricture. He normally drinks well water at home. He does not add citrus to his water. There is no history of NSAID use. He denies chest pain or shortness of breath. He is still complaining of left flank pain.       Current Facility-Administered Medications   Medication Dose Route Frequency    heparin (porcine) 25,000 units in 0.45% saline 250 ml infusion  9.8-25 Units/kg/hr (Adjusted) IntraVENous TITRATE    heparin (porcine) 1,000 unit/mL injection 4,000 Units  4,000 Units IntraVENous ONCE    [START ON 12/8/2021] atorvastatin (LIPITOR) tablet 80 mg  80 mg Oral DAILY    heparin (porcine) 1,000 unit/mL injection 4,000 Units  4,000 Units IntraVENous PRN    Or    heparin (porcine) 1,000 unit/mL injection 2,000 Units  2,000 Units IntraVENous PRN    sodium chloride (NS) flush 5-40 mL  5-40 mL IntraVENous Q8H    sodium chloride (NS) flush 5-40 mL  5-40 mL IntraVENous PRN    acetaminophen (TYLENOL) tablet 650 mg  650 mg Oral Q6H PRN    Or    acetaminophen (TYLENOL) suppository 650 mg  650 mg Rectal Q6H PRN    polyethylene glycol (MIRALAX) packet 17 g  17 g Oral DAILY PRN    ondansetron (ZOFRAN ODT) tablet 4 mg  4 mg Oral Q8H PRN    Or    ondansetron (ZOFRAN) injection 4 mg  4 mg IntraVENous Q6H PRN    HYDROmorphone (DILAUDID) syringe 0.5 mg  0.5 mg IntraVENous Q4H PRN    sodium chloride (NS) flush 5-40 mL  5-40 mL IntraVENous Q8H    sodium chloride (NS) flush 5-40 mL  5-40 mL IntraVENous PRN    HYDROcodone-acetaminophen (NORCO) 5-325 mg per tablet 1 Tablet  1 Tablet Oral PRN    fentaNYL citrate (PF) injection 50 mcg  50 mcg IntraVENous Multiple    HYDROmorphone (DILAUDID) syringe 0.4 mg  0.4 mg IntraVENous Multiple    ondansetron (ZOFRAN) injection 4 mg  4 mg IntraVENous PRN    midazolam (VERSED) injection 0.5 mg  0.5 mg IntraVENous Q5MIN PRN    ePHEDrine sulfate (AKOVAZ) 50 mg/mL injection 5 mg  5 mg IntraVENous PRN    cefTRIAXone (ROCEPHIN) 2 g in 0.9% sodium chloride (MBP/ADV) 50 mL MBP  2 g IntraVENous ACB    glucose chewable tablet 16 g  4 Tablet Oral PRN    dextrose (D50W) injection syrg 12.5-25 g  25-50 mL IntraVENous PRN    glucagon (GLUCAGEN) injection 1 mg  1 mg IntraMUSCular PRN    insulin lispro (HUMALOG) injection   SubCUTAneous AC&HS    pantoprazole (PROTONIX) 40 mg in 0.9% sodium chloride 10 mL injection  40 mg IntraVENous Q12H    aspirin delayed-release tablet 81 mg  81 mg Oral DAILY        Past Medical History:   Diagnosis Date    Diabetes ([facility] [address]     Gout     Hypertension     Hypothyroidism     Prostate cancer ([facility] [address]     Renal stones       Past Surgical History:   Procedure Laterality Date    HX PROSTATE SURGERY       History reviewed. No pertinent family history. Social History     Tobacco Use    Smoking status: Never Smoker    Smokeless tobacco: Never Used   Substance Use Topics    Alcohol use: Not Currently         Review of Systems    Review of Systems   Respiratory: Negative for cough and shortness of breath. Cardiovascular: Negative for chest pain, palpitations and leg swelling. Gastrointestinal: Negative for abdominal pain, nausea and vomiting. Neurological: Negative for dizziness and headaches. Physical Exam:     Physical Exam  HENT:      Mouth/Throat:      Mouth: Mucous membranes are moist.   Cardiovascular:      Rate and Rhythm: Regular rhythm.    Pulmonary:      Breath sounds: Normal breath sounds. Abdominal:      General: Bowel sounds are normal.      Palpations: Abdomen is soft. Tenderness: There is abdominal tenderness. Musculoskeletal:         General: No swelling. Neurological:      Mental Status: He is alert. No asterixis  Tender in the left flank. Patient Vitals for the past 8 hrs:   BP Temp Pulse SpO2   12/07/21 1500 123/83 97.6 °F (36.4 °C) (!) 52 97 %   12/07/21 1100 131/89 97.6 °F (36.4 °C) (!) 53 98 %     12/07 0701 - 12/07 1900  In: 1762.5 [I.V.:1762.5]  Out: 200 [Urine:200]  12/05 1901 - 12/07 0700  In: 5717 [P.O.:720; I.V.:3200]  Out: 450 [Urine:450]          XR FLUOROSCOPY UNDER 60 MINUTES   Final Result      CT ABD PELV WO CONT   Final Result   1. 5 x 5 mm obstructing stone in the distal left ureter. Left perinephric   inflammation. Correlate to medical renal disease to include renal dysfunction   and pyelonephritis. 2. Nonobstructing bilateral nephrolithiasis. 3. Thickening versus nondistention of the proximal duodenum. Correlate for any   clinical symptoms. 4. Trace bilateral pleural effusions. 5. Hepatomegaly. 6. Other findings above. XR CHEST PORT   Final Result           Data Review   Recent Labs     12/07/21  0525 12/06/21  1504   WBC 13.5* 2.2*   HGB 11.7* 13.4   HCT 35.5* 39.2   * 116*     Recent Labs     12/07/21  0525 12/06/21  1504   * 128*   K 4.1 4.0    96*   CO2 22 22   * 143*   BUN 49* 40*   CREA 2.99* 2.19*   CA 7.8* 8.5   MG 2.3  --    ALB 2.4* 2.9*   ALT 46 49     No components found for: GLPOC  No results for input(s): PH, PCO2, PO2, HCO3, FIO2 in the last 72 hours. No results for input(s): INR, INREXT, INREXT in the last 72 hours. Assessment:           Active Problems:    Pyelonephritis (12/6/2021)    Acute kidney injury secondary to obstructing calculus in the left kidney. Do not have a baseline creatinine.   I would not be surprised if he has underlying CKD due to hypertension and bilateral renal stones. Urinalysis showed large blood and 100 mg of protein. Hypertension under fairly good control    Diabetes mellitus    Left-sided pyelonephritis    Left ureteral obstructing stone and bilateral nephrolithiasis    Hyperlipidemia    History of gout    Hypothyroidism    Increase in troponin. This is being evaluated by cardiology. Plan: Will need quantification of proteinuria after the infection is treated. IV fluids  Will follow renal function. No indication for extracorporeal therapy. He will need needed 24-hour urine collection as an outpatient to measure urine citrate ,calcium ,phosphorus, uric acid and oxalate. He could benefit from the addition of hydrochlorothiazide.   Thank you for this consult

## 2021-12-07 NOTE — PROGRESS NOTES
Received a call from on-call hospitalist regarding patient's elevated troponins. Reviewed chart including EKG, spoke to nurse as well. Patient is a 59year old male with a history of diabetes mellitus, hypertension but no prior cardiac history who is admitted for urosepsis, pyelonephritis and underwent ureteral stent placement, cystoscopy. He was febrile, and in acute kidney injury. Troponins done post procedure are significantly elevated above 1000. EKG shows normal sinus rhythm with LVH, no ischemic changes. There is no reported history of chest pain. This appears to be non-MI troponin elevation in the setting of sepsis. I would recommend holding off on heparin infusion, continue to trend troponins till peak, and start aspirin and beta blockers as hemodynamically tolerated. We will get an echocardiogram in the morning and see him in formal consultation in as well. Please do not hestitate to reach out to me anytime with any change in clinical status.

## 2021-12-07 NOTE — PROGRESS NOTES
Problem: Falls - Risk of  Goal: *Absence of Falls  Description: Document Irwin Valerio Fall Risk and appropriate interventions in the flowsheet.   Outcome: Progressing Towards Goal  Note: Fall Risk Interventions:            Medication Interventions: Evaluate medications/consider consulting pharmacy    Elimination Interventions: Call light in reach

## 2021-12-07 NOTE — PERIOP NOTES
Received call from the lab around [phone] with critical lab results of high sensitivity troponin on 9506. Dr. Pearl Brown informed and result given to PACU nurse during handoff.

## 2021-12-07 NOTE — PROGRESS NOTES
TRANSFER - OUT REPORT:    Verbal report given [facility] for routine progression of care       Report consisted of patients Situation, Background, Assessment and   Recommendations(SBAR). Information from the following report(s) SBAR, Kardex, [facility] and Cardiac Rhythm sinus jena was reviewed with the receiving nurse. Lines:   Peripheral IV 12/06/21 Anterior; Right Forearm (Active)   Site Assessment Clean, dry, & intact 12/07/21 1500   Phlebitis Assessment 0 12/07/21 1500   Infiltration Assessment 0 12/07/21 1500   Dressing Status Clean, dry, & intact 12/07/21 1500   Dressing Type Transparent 12/07/21 1500   Hub Color/Line Status Infusing 12/07/21 1500   Action Taken Open ports on tubing capped 12/07/21 1500   Alcohol Cap Used Yes 12/07/21 1500       Peripheral IV 12/06/21 Left; Anterior Antecubital (Active)        Opportunity for questions and clarification was provided.       Patient transported with:   Registered Nurse

## 2021-12-07 NOTE — ROUTINE PROCESS
TRANSFER - OUT REPORT:    Verbal report given to [facility] (name) on [facility]  being transferred to [facility]) for routine post - op       Report consisted of patients Situation, Background, Assessment and   Recommendations(SBAR). Information from the following report(s) SBAR, ED Summary, OR Summary, Procedure Summary, Intake/Output, Recent Results, Cardiac Rhythm SR and Dual Neuro Assessment was reviewed with the receiving nurse. Opportunity for questions and clarification was provided.       Patient transported with:   Monitor  Registered Nurse   [address] and [employer]

## 2021-12-08 LAB
APTT PPP: 28.5 SEC (ref 21.2–34.1)
APTT PPP: 29.4 SEC (ref 21.2–34.1)
APTT PPP: 31.1 SEC (ref 21.2–34.1)
BACTERIA SPEC CULT: NORMAL
BUN SERPL-MCNC: 62 MG/DL (ref 6–20)
CREAT SERPL-MCNC: 3.47 MG/DL (ref 0.7–1.3)
ERYTHROCYTE [DISTWIDTH] IN BLOOD BY AUTOMATED COUNT: 13.2 % (ref 11.5–14.5)
GLUCOSE BLD STRIP.AUTO-MCNC: 123 MG/DL (ref 65–117)
GLUCOSE BLD STRIP.AUTO-MCNC: 139 MG/DL (ref 65–117)
GLUCOSE BLD STRIP.AUTO-MCNC: 150 MG/DL (ref 65–117)
GLUCOSE BLD STRIP.AUTO-MCNC: 163 MG/DL (ref 65–117)
HCT VFR BLD AUTO: 37.1 % (ref 36.6–50.3)
HGB BLD-MCNC: 12.3 G/DL (ref 12.1–17)
MCH RBC QN AUTO: 29.6 PG (ref 26–34)
MCHC RBC AUTO-ENTMCNC: 33.2 G/DL (ref 30–36.5)
MCV RBC AUTO: 89.2 FL (ref 80–99)
NRBC # BLD: 0 K/UL (ref 0–0.01)
NRBC BLD-RTO: 0 PER 100 WBC
PERFORMED BY, TECHID: ABNORMAL
PLATELET # BLD AUTO: 127 K/UL (ref 150–400)
RBC # BLD AUTO: 4.16 M/UL (ref 4.1–5.7)
SPECIAL REQUESTS,SREQ: NORMAL
THERAPEUTIC RANGE,PTTT: NORMAL SEC (ref 82–109)
TROPONIN-HIGH SENSITIVITY: 61 NG/L (ref 0–76)
WBC # BLD AUTO: 15.9 K/UL (ref 4.1–11.1)

## 2021-12-08 PROCEDURE — 74011000250 HC RX REV CODE- 250: Performed by: NURSE PRACTITIONER

## 2021-12-08 PROCEDURE — 74011250636 HC RX REV CODE- 250/636: Performed by: INTERNAL MEDICINE

## 2021-12-08 PROCEDURE — 74011250637 HC RX REV CODE- 250/637: Performed by: HOSPITALIST

## 2021-12-08 PROCEDURE — 85730 THROMBOPLASTIN TIME PARTIAL: CPT

## 2021-12-08 PROCEDURE — 74011636637 HC RX REV CODE- 636/637: Performed by: INTERNAL MEDICINE

## 2021-12-08 PROCEDURE — 85027 COMPLETE CBC AUTOMATED: CPT

## 2021-12-08 PROCEDURE — 65270000029 HC RM PRIVATE

## 2021-12-08 PROCEDURE — C9113 INJ PANTOPRAZOLE SODIUM, VIA: HCPCS | Performed by: NURSE PRACTITIONER

## 2021-12-08 PROCEDURE — 74011250636 HC RX REV CODE- 250/636: Performed by: NURSE PRACTITIONER

## 2021-12-08 PROCEDURE — 74011250637 HC RX REV CODE- 250/637: Performed by: INTERNAL MEDICINE

## 2021-12-08 PROCEDURE — 36415 COLL VENOUS BLD VENIPUNCTURE: CPT

## 2021-12-08 PROCEDURE — 82962 GLUCOSE BLOOD TEST: CPT

## 2021-12-08 PROCEDURE — 84484 ASSAY OF TROPONIN QUANT: CPT

## 2021-12-08 PROCEDURE — 74011000258 HC RX REV CODE- 258: Performed by: INTERNAL MEDICINE

## 2021-12-08 PROCEDURE — 84520 ASSAY OF UREA NITROGEN: CPT

## 2021-12-08 PROCEDURE — 82565 ASSAY OF CREATININE: CPT

## 2021-12-08 PROCEDURE — 74011250637 HC RX REV CODE- 250/637: Performed by: ANESTHESIOLOGY

## 2021-12-08 RX ORDER — PANTOPRAZOLE SODIUM 40 MG/1
40 TABLET, DELAYED RELEASE ORAL
Status: DISCONTINUED | OUTPATIENT
Start: 2021-12-09 | End: 2021-12-21 | Stop reason: HOSPADM

## 2021-12-08 RX ORDER — TAMSULOSIN HYDROCHLORIDE 0.4 MG/1
0.4 CAPSULE ORAL DAILY
Status: DISCONTINUED | OUTPATIENT
Start: 2021-12-08 | End: 2021-12-21 | Stop reason: HOSPADM

## 2021-12-08 RX ORDER — SODIUM CHLORIDE 9 MG/ML
100 INJECTION, SOLUTION INTRAVENOUS CONTINUOUS
Status: DISPENSED | OUTPATIENT
Start: 2021-12-08 | End: 2021-12-09

## 2021-12-08 RX ADMIN — ONDANSETRON 4 MG: 4 TABLET, ORALLY DISINTEGRATING ORAL at 18:04

## 2021-12-08 RX ADMIN — CEFTRIAXONE SODIUM 2 G: 2 INJECTION, POWDER, FOR SOLUTION INTRAMUSCULAR; INTRAVENOUS at 09:22

## 2021-12-08 RX ADMIN — HYDROMORPHONE HYDROCHLORIDE 0.5 MG: 1 INJECTION, SOLUTION INTRAMUSCULAR; INTRAVENOUS; SUBCUTANEOUS at 09:21

## 2021-12-08 RX ADMIN — ATORVASTATIN CALCIUM 80 MG: 40 TABLET, FILM COATED ORAL at 09:21

## 2021-12-08 RX ADMIN — Medication 10 ML: at 13:04

## 2021-12-08 RX ADMIN — HEPARIN SODIUM 4000 UNITS: 1000 INJECTION, SOLUTION INTRAVENOUS; SUBCUTANEOUS at 22:07

## 2021-12-08 RX ADMIN — Medication 10 ML: at 06:03

## 2021-12-08 RX ADMIN — HEPARIN SODIUM 17.8 UNITS/KG/HR: 10000 INJECTION, SOLUTION INTRAVENOUS at 22:07

## 2021-12-08 RX ADMIN — SODIUM CHLORIDE 40 MG: 9 INJECTION, SOLUTION INTRAMUSCULAR; INTRAVENOUS; SUBCUTANEOUS at 09:23

## 2021-12-08 RX ADMIN — INSULIN LISPRO 2 UNITS: 100 INJECTION, SOLUTION INTRAVENOUS; SUBCUTANEOUS at 09:22

## 2021-12-08 RX ADMIN — HYDROCODONE BITARTRATE AND ACETAMINOPHEN 1 TABLET: 5; 325 TABLET ORAL at 04:49

## 2021-12-08 RX ADMIN — Medication 10 ML: at 14:36

## 2021-12-08 RX ADMIN — HYDROMORPHONE HYDROCHLORIDE 0.5 MG: 1 INJECTION, SOLUTION INTRAMUSCULAR; INTRAVENOUS; SUBCUTANEOUS at 18:04

## 2021-12-08 RX ADMIN — Medication 10 ML: at 00:24

## 2021-12-08 RX ADMIN — Medication 10 ML: at 06:04

## 2021-12-08 RX ADMIN — SODIUM CHLORIDE 100 ML/HR: 9 INJECTION, SOLUTION INTRAVENOUS at 14:36

## 2021-12-08 RX ADMIN — ASPIRIN 81 MG: 81 TABLET, COATED ORAL at 09:21

## 2021-12-08 RX ADMIN — INSULIN LISPRO 2 UNITS: 100 INJECTION, SOLUTION INTRAVENOUS; SUBCUTANEOUS at 16:32

## 2021-12-08 RX ADMIN — HEPARIN SODIUM 4000 UNITS: 1000 INJECTION, SOLUTION INTRAVENOUS; SUBCUTANEOUS at 13:14

## 2021-12-08 RX ADMIN — TAMSULOSIN HYDROCHLORIDE 0.4 MG: 0.4 CAPSULE ORAL at 16:32

## 2021-12-08 RX ADMIN — Medication 10 ML: at 13:05

## 2021-12-08 RX ADMIN — HEPARIN SODIUM 13.8 UNITS/KG/HR: 10000 INJECTION, SOLUTION INTRAVENOUS at 13:16

## 2021-12-08 RX ADMIN — HYDROCODONE BITARTRATE AND ACETAMINOPHEN 1 TABLET: 5; 325 TABLET ORAL at 13:13

## 2021-12-08 NOTE — PROGRESS NOTES
Cardiology Progress Note      12/8/2021 10:38 AM    Admit Date: 12/6/2021    Admit Diagnosis: Pyelonephritis [N12]      Subjective:   Patient seen and examined. He remains chest pain-free. No overnight events.     Visit Vitals  /81 (BP 1 Location: Right upper arm, BP Patient Position: At rest;Semi fowlers)   Pulse (!) 57   Temp 97.7 °F (36.5 °C)   Resp 18   Ht 6' 9\" (2.057 m)   Wt 107 kg (236 lb)   SpO2 97%   BMI 25.29 kg/m²     Current Facility-Administered Medications   Medication Dose Route Frequency    heparin (porcine) 25,000 units in 0.45% saline 250 ml infusion  9.8-25 Units/kg/hr (Adjusted) IntraVENous TITRATE    atorvastatin (LIPITOR) tablet 80 mg  80 mg Oral DAILY    heparin (porcine) 1,000 unit/mL injection 4,000 Units  4,000 Units IntraVENous PRN    Or    heparin (porcine) 1,000 unit/mL injection 2,000 Units  2,000 Units IntraVENous PRN    sodium chloride (NS) flush 5-40 mL  5-40 mL IntraVENous Q8H    sodium chloride (NS) flush 5-40 mL  5-40 mL IntraVENous PRN    acetaminophen (TYLENOL) tablet 650 mg  650 mg Oral Q6H PRN    Or    acetaminophen (TYLENOL) suppository 650 mg  650 mg Rectal Q6H PRN    polyethylene glycol (MIRALAX) packet 17 g  17 g Oral DAILY PRN    ondansetron (ZOFRAN ODT) tablet 4 mg  4 mg Oral Q8H PRN    Or    ondansetron (ZOFRAN) injection 4 mg  4 mg IntraVENous Q6H PRN    HYDROmorphone (DILAUDID) syringe 0.5 mg  0.5 mg IntraVENous Q4H PRN    sodium chloride (NS) flush 5-40 mL  5-40 mL IntraVENous Q8H    sodium chloride (NS) flush 5-40 mL  5-40 mL IntraVENous PRN    HYDROcodone-acetaminophen (NORCO) 5-325 mg per tablet 1 Tablet  1 Tablet Oral PRN    fentaNYL citrate (PF) injection 50 mcg  50 mcg IntraVENous Multiple    HYDROmorphone (DILAUDID) syringe 0.4 mg  0.4 mg IntraVENous Multiple    ondansetron (ZOFRAN) injection 4 mg  4 mg IntraVENous PRN    midazolam (VERSED) injection 0.5 mg  0.5 mg IntraVENous Q5MIN PRN    ePHEDrine sulfate (AKOVAZ) 50 mg/mL injection 5 mg  5 mg IntraVENous PRN    cefTRIAXone (ROCEPHIN) 2 g in 0.9% sodium chloride (MBP/ADV) 50 mL MBP  2 g IntraVENous ACB    glucose chewable tablet 16 g  4 Tablet Oral PRN    dextrose (D50W) injection syrg 12.5-25 g  25-50 mL IntraVENous PRN    glucagon (GLUCAGEN) injection 1 mg  1 mg IntraMUSCular PRN    insulin lispro (HUMALOG) injection   SubCUTAneous AC&HS    pantoprazole (PROTONIX) 40 mg in 0.9% sodium chloride 10 mL injection  40 mg IntraVENous Q12H    aspirin delayed-release tablet 81 mg  81 mg Oral DAILY         Objective:      Physical Exam:  Visit Vitals  /81 (BP 1 Location: Right upper arm, BP Patient Position: At rest;Semi fowlers)   Pulse (!) 57   Temp 97.7 °F (36.5 °C)   Resp 18   Ht 6' 9\" (2.057 m)   Wt 107 kg (236 lb)   SpO2 97%   BMI 25.29 kg/m²     General Appearance:  Well developed, well nourished,alert and oriented x 3, and individual in no acute distress. Ears/Nose/Mouth/Throat:   Hearing grossly normal.         Neck: Supple. Chest:   Lungs clear to auscultation bilaterally. Cardiovascular:  Regular rate and rhythm, S1, S2 normal, no murmur. Abdomen:   Soft, non-tender, bowel sounds are active. Extremities: No edema bilaterally. Skin: Warm and dry.                Data Review:   Labs:    Recent Results (from the past 24 hour(s))   GLUCOSE, POC    Collection Time: 12/07/21 11:12 AM   Result Value Ref Range    Glucose (POC) 193 (H) 65 - 117 mg/dL    Performed by [address] [place] [id number], POC    Collection Time: 12/07/21  4:10 PM   Result Value Ref Range    Glucose (POC) 125 (H) 65 - 117 mg/dL    Performed by [address], POC    Collection Time: 12/07/21  8:39 PM   Result Value Ref Range    Glucose (POC) 135 (H) 65 - 117 mg/dL    Performed by Nix Annette    CK W/ REFLX CKMB    Collection Time: 12/07/21  9:35 PM   Result Value Ref Range    .0 (H) 39 - 308 ng/mL   PTT    Collection Time: 12/07/21  9:35 PM   Result Value Ref Range    aPTT 28.7 21.2 - 34.1 sec    aPTT, therapeutic range   82 - 109 sec   CK-MB,QUANT. Collection Time: 12/07/21  9:35 PM   Result Value Ref Range    CK - MB 3.9 (H) <3.6 ng/mL    CK-MB Index 0.7     PTT    Collection Time: 12/08/21  6:46 AM   Result Value Ref Range    aPTT 29.4 21.2 - 34.1 sec    aPTT, therapeutic range   82 - 109 sec   GLUCOSE, POC    Collection Time: 12/08/21  7:56 AM   Result Value Ref Range    Glucose (POC) 150 (H) 65 - 117 mg/dL    Performed by Jone Gutierrez        Telemetry: normal sinus rhythm      Assessment:   NSTEMI type II  Pyelonephritis status post ureteral stent  Acute kidney injury  Diabetes mellitus  Hypertension  Hyperlipidemia    Plan:   76-year-old male with a past medical history as above who is seen for elevated troponins after undergoing ureteral stent placement.  -Patient has had no clear anginal symptoms but he has significantly elevated troponins which are now downtrending. I reviewed his transthoracic echocardiogram which shows mild LV dysfunction with lateral, anterolateral, inferolateral wall motion abnormality consistent with coronary artery disease in the left circumflex/right coronary artery territory  -Creatinine from today is pending. He is being followed by nephrology  -Continue heparin infusion for 48 hours  -Continue aspirin, high intensity statin. Add beta-blockers as hemodynamically able.   Resting heart rate is in the 50s hence I am reluctant to start this yet  -Once his presenting problems have stabilized and renal status has been optimized, he will need cardiac catheterization for further evaluation  -We will continue to follow the patient and give further recommendations pending his clinical course.     Thank you for allowing us to participate in the care of your patient

## 2021-12-08 NOTE — PROGRESS NOTES
Problem: Falls - Risk of  Goal: *Absence of Falls  Description: Document Keely Rios Fall Risk and appropriate interventions in the flowsheet.   Outcome: Progressing Towards Goal  Note: Fall Risk Interventions:            Medication Interventions: Teach patient to arise slowly, Bed/chair exit alarm    Elimination Interventions: Bed/chair exit alarm, Call light in reach              Problem: Patient Education: Go to Patient Education Activity  Goal: Patient/Family Education  Outcome: Progressing Towards Goal     Problem: Patient Education: Go to Patient Education Activity  Goal: Patient/Family Education  Outcome: Progressing Towards Goal     Problem: Sepsis: Day of Diagnosis  Goal: Off Pathway (Use only if patient is Off Pathway)  Outcome: Progressing Towards Goal  Goal: *Fluid resuscitation  Outcome: Progressing Towards Goal  Goal: *Paired blood cultures prior to first dose of antibiotic  Outcome: Progressing Towards Goal  Goal: *First dose of  appropriate antibiotic within 3 hours of arrival to ED, within 1 hour of arrival to ICU  Outcome: Progressing Towards Goal  Goal: *Lactic acid with first set of blood cultures  Outcome: Progressing Towards Goal  Goal: *Pneumococcal immunization (if eligible)  Outcome: Progressing Towards Goal  Goal: *Influenza immunization (if eligible)  Outcome: Progressing Towards Goal  Goal: Activity/Safety  Outcome: Progressing Towards Goal  Goal: Consults, if ordered  Outcome: Progressing Towards Goal  Goal: Diagnostic Test/Procedures  Outcome: Progressing Towards Goal  Goal: Nutrition/Diet  Outcome: Progressing Towards Goal  Goal: Discharge Planning  Outcome: Progressing Towards Goal  Goal: Medications  Outcome: Progressing Towards Goal  Goal: Respiratory  Outcome: Progressing Towards Goal  Goal: Treatments/Interventions/Procedures  Outcome: Progressing Towards Goal  Goal: Psychosocial  Outcome: Progressing Towards Goal     Problem: Sepsis: Day 2  Goal: Off Pathway (Use only if patient is Off Pathway)  Outcome: Progressing Towards Goal  Goal: *Central Venous Pressure maintained at 8-12 mm Hg  Outcome: Progressing Towards Goal  Goal: *Hemodynamically stable  Outcome: Progressing Towards Goal  Goal: *Tolerating diet  Outcome: Progressing Towards Goal  Goal: Activity/Safety  Outcome: Progressing Towards Goal  Goal: Consults, if ordered  Outcome: Progressing Towards Goal  Goal: Diagnostic Test/Procedures  Outcome: Progressing Towards Goal  Goal: Nutrition/Diet  Outcome: Progressing Towards Goal  Goal: Discharge Planning  Outcome: Progressing Towards Goal  Goal: Medications  Outcome: Progressing Towards Goal  Goal: Respiratory  Outcome: Progressing Towards Goal  Goal: Treatments/Interventions/Procedures  Outcome: Progressing Towards Goal  Goal: Psychosocial  Outcome: Progressing Towards Goal     Problem: Sepsis: Day 3  Goal: Off Pathway (Use only if patient is Off Pathway)  Outcome: Progressing Towards Goal  Goal: *Central Venous Pressure maintained at 8-12 mm Hg  Outcome: Progressing Towards Goal  Goal: *Oxygen saturation within defined limits  Outcome: Progressing Towards Goal  Goal: *Vital sign stability  Outcome: Progressing Towards Goal  Goal: *Tolerating diet  Outcome: Progressing Towards Goal  Goal: *Demonstrates progressive activity  Outcome: Progressing Towards Goal  Goal: Activity/Safety  Outcome: Progressing Towards Goal  Goal: Consults, if ordered  Outcome: Progressing Towards Goal  Goal: Diagnostic Test/Procedures  Outcome: Progressing Towards Goal  Goal: Nutrition/Diet  Outcome: Progressing Towards Goal  Goal: Discharge Planning  Outcome: Progressing Towards Goal  Goal: Medications  Outcome: Progressing Towards Goal  Goal: Respiratory  Outcome: Progressing Towards Goal  Goal: Treatments/Interventions/Procedures  Outcome: Progressing Towards Goal  Goal: Psychosocial  Outcome: Progressing Towards Goal     Problem: Sepsis: Day 4  Goal: Off Pathway (Use only if patient is Off Pathway)  Outcome: Progressing Towards Goal  Goal: Activity/Safety  Outcome: Progressing Towards Goal  Goal: Consults, if ordered  Outcome: Progressing Towards Goal  Goal: Diagnostic Test/Procedures  Outcome: Progressing Towards Goal  Goal: Nutrition/Diet  Outcome: Progressing Towards Goal  Goal: Discharge Planning  Outcome: Progressing Towards Goal  Goal: Medications  Outcome: Progressing Towards Goal  Goal: Respiratory  Outcome: Progressing Towards Goal  Goal: Treatments/Interventions/Procedures  Outcome: Progressing Towards Goal  Goal: Psychosocial  Outcome: Progressing Towards Goal  Goal: *Oxygen saturation within defined limits  Outcome: Progressing Towards Goal  Goal: *Hemodynamically stable  Outcome: Progressing Towards Goal  Goal: *Vital signs within defined limits  Outcome: Progressing Towards Goal  Goal: *Tolerating diet  Outcome: Progressing Towards Goal  Goal: *Demonstrates progressive activity  Outcome: Progressing Towards Goal  Goal: *Fluid volume maintenance  Outcome: Progressing Towards Goal     Problem: Sepsis: Day 5  Goal: Off Pathway (Use only if patient is Off Pathway)  Outcome: Progressing Towards Goal  Goal: *Oxygen saturation within defined limits  Outcome: Progressing Towards Goal  Goal: *Vital signs within defined limits  Outcome: Progressing Towards Goal  Goal: *Tolerating diet  Outcome: Progressing Towards Goal  Goal: *Demonstrates progressive activity  Outcome: Progressing Towards Goal  Goal: *Discharge plan identified  Outcome: Progressing Towards Goal  Goal: Activity/Safety  Outcome: Progressing Towards Goal  Goal: Consults, if ordered  Outcome: Progressing Towards Goal  Goal: Diagnostic Test/Procedures  Outcome: Progressing Towards Goal  Goal: Nutrition/Diet  Outcome: Progressing Towards Goal  Goal: Discharge Planning  Outcome: Progressing Towards Goal  Goal: Medications  Outcome: Progressing Towards Goal  Goal: Respiratory  Outcome: Progressing Towards Goal  Goal: Treatments/Interventions/Procedures  Outcome: Progressing Towards Goal  Goal: Psychosocial  Outcome: Progressing Towards Goal     Problem: Sepsis: Day 6  Goal: Off Pathway (Use only if patient is Off Pathway)  Outcome: Progressing Towards Goal  Goal: *Oxygen saturation within defined limits  Outcome: Progressing Towards Goal  Goal: *Vital signs within defined limits  Outcome: Progressing Towards Goal  Goal: *Tolerating diet  Outcome: Progressing Towards Goal  Goal: *Demonstrates progressive activity  Outcome: Progressing Towards Goal  Goal: Influenza immunization  Outcome: Progressing Towards Goal  Goal: *Pneumococcal immunization  Outcome: Progressing Towards Goal  Goal: Activity/Safety  Outcome: Progressing Towards Goal  Goal: Diagnostic Test/Procedures  Outcome: Progressing Towards Goal  Goal: Nutrition/Diet  Outcome: Progressing Towards Goal  Goal: Discharge Planning  Outcome: Progressing Towards Goal  Goal: Medications  Outcome: Progressing Towards Goal  Goal: Respiratory  Outcome: Progressing Towards Goal  Goal: Treatments/Interventions/Procedures  Outcome: Progressing Towards Goal  Goal: Psychosocial  Outcome: Progressing Towards Goal     Problem: Sepsis: Discharge Outcomes  Goal: *Vital signs within defined limits  Outcome: Progressing Towards Goal  Goal: *Tolerating diet  Outcome: Progressing Towards Goal  Goal: *Verbalizes understanding and describes prescribed diet  Outcome: Progressing Towards Goal  Goal: *Demonstrates progressive activity  Outcome: Progressing Towards Goal  Goal: *Describes follow-up/return visits to physicians  Outcome: Progressing Towards Goal  Goal: *Verbalizes name, dosage, time, side effects, and number of days to continue medications  Outcome: Progressing Towards Goal  Goal: *Influenza immunization (Oct-Mar only)  Outcome: Progressing Towards Goal  Goal: *Pneumococcal immunization  Outcome: Progressing Towards Goal  Goal: *Lungs clear or at baseline  Outcome: Progressing Towards Goal  Goal: *Oxygen saturation returns to baseline or 90% or better on room air  Outcome: Progressing Towards Goal  Goal: *Glycemic control  Outcome: Progressing Towards Goal  Goal: *Absence of deep venous thrombosis signs and symptoms(Stroke Metric)  Outcome: Progressing Towards Goal  Goal: *Describes available resources and support systems  Outcome: Progressing Towards Goal  Goal: *Optimal pain control at patient's stated goal  Outcome: Progressing Towards Goal

## 2021-12-08 NOTE — PROGRESS NOTES
Problem: Falls - Risk of  Goal: *Absence of Falls  Description: Document Angela Blood Fall Risk and appropriate interventions in the flowsheet.   Outcome: Progressing Towards Goal  Note: Fall Risk Interventions:            Medication Interventions: Teach patient to arise slowly, Bed/chair exit alarm    Elimination Interventions: Bed/chair exit alarm, Call light in reach              Problem: Patient Education: Go to Patient Education Activity  Goal: Patient/Family Education  Outcome: Progressing Towards Goal     Problem: Patient Education: Go to Patient Education Activity  Goal: Patient/Family Education  Outcome: Progressing Towards Goal     Problem: Sepsis: Day of Diagnosis  Goal: Off Pathway (Use only if patient is Off Pathway)  Outcome: Progressing Towards Goal  Goal: *Fluid resuscitation  Outcome: Progressing Towards Goal  Goal: *Paired blood cultures prior to first dose of antibiotic  Outcome: Progressing Towards Goal  Goal: *First dose of  appropriate antibiotic within 3 hours of arrival to ED, within 1 hour of arrival to ICU  Outcome: Progressing Towards Goal  Goal: *Lactic acid with first set of blood cultures  Outcome: Progressing Towards Goal  Goal: *Pneumococcal immunization (if eligible)  Outcome: Progressing Towards Goal  Goal: *Influenza immunization (if eligible)  Outcome: Progressing Towards Goal  Goal: Activity/Safety  Outcome: Progressing Towards Goal  Goal: Consults, if ordered  Outcome: Progressing Towards Goal  Goal: Diagnostic Test/Procedures  Outcome: Progressing Towards Goal  Goal: Nutrition/Diet  Outcome: Progressing Towards Goal  Goal: Discharge Planning  Outcome: Progressing Towards Goal  Goal: Medications  Outcome: Progressing Towards Goal  Goal: Respiratory  Outcome: Progressing Towards Goal  Goal: Treatments/Interventions/Procedures  Outcome: Progressing Towards Goal  Goal: Psychosocial  Outcome: Progressing Towards Goal     Problem: Sepsis: Day 2  Goal: Off Pathway (Use only if patient is Off Pathway)  Outcome: Progressing Towards Goal  Goal: *Central Venous Pressure maintained at 8-12 mm Hg  Outcome: Progressing Towards Goal  Goal: *Hemodynamically stable  Outcome: Progressing Towards Goal  Goal: *Tolerating diet  Outcome: Progressing Towards Goal  Goal: Activity/Safety  Outcome: Progressing Towards Goal  Goal: Consults, if ordered  Outcome: Progressing Towards Goal  Goal: Diagnostic Test/Procedures  Outcome: Progressing Towards Goal  Goal: Nutrition/Diet  Outcome: Progressing Towards Goal  Goal: Discharge Planning  Outcome: Progressing Towards Goal  Goal: Medications  Outcome: Progressing Towards Goal  Goal: Respiratory  Outcome: Progressing Towards Goal  Goal: Treatments/Interventions/Procedures  Outcome: Progressing Towards Goal  Goal: Psychosocial  Outcome: Progressing Towards Goal     Problem: Sepsis: Day 3  Goal: Off Pathway (Use only if patient is Off Pathway)  Outcome: Progressing Towards Goal  Goal: *Central Venous Pressure maintained at 8-12 mm Hg  Outcome: Progressing Towards Goal  Goal: *Oxygen saturation within defined limits  Outcome: Progressing Towards Goal  Goal: *Vital sign stability  Outcome: Progressing Towards Goal  Goal: *Tolerating diet  Outcome: Progressing Towards Goal  Goal: *Demonstrates progressive activity  Outcome: Progressing Towards Goal  Goal: Activity/Safety  Outcome: Progressing Towards Goal  Goal: Consults, if ordered  Outcome: Progressing Towards Goal  Goal: Diagnostic Test/Procedures  Outcome: Progressing Towards Goal  Goal: Nutrition/Diet  Outcome: Progressing Towards Goal  Goal: Discharge Planning  Outcome: Progressing Towards Goal  Goal: Medications  Outcome: Progressing Towards Goal  Goal: Respiratory  Outcome: Progressing Towards Goal  Goal: Treatments/Interventions/Procedures  Outcome: Progressing Towards Goal  Goal: Psychosocial  Outcome: Progressing Towards Goal     Problem: Sepsis: Day 4  Goal: Off Pathway (Use only if patient is Off Pathway)  Outcome: Progressing Towards Goal  Goal: Activity/Safety  Outcome: Progressing Towards Goal  Goal: Consults, if ordered  Outcome: Progressing Towards Goal  Goal: Diagnostic Test/Procedures  Outcome: Progressing Towards Goal  Goal: Nutrition/Diet  Outcome: Progressing Towards Goal  Goal: Discharge Planning  Outcome: Progressing Towards Goal  Goal: Medications  Outcome: Progressing Towards Goal  Goal: Respiratory  Outcome: Progressing Towards Goal  Goal: Treatments/Interventions/Procedures  Outcome: Progressing Towards Goal  Goal: Psychosocial  Outcome: Progressing Towards Goal  Goal: *Oxygen saturation within defined limits  Outcome: Progressing Towards Goal  Goal: *Hemodynamically stable  Outcome: Progressing Towards Goal  Goal: *Vital signs within defined limits  Outcome: Progressing Towards Goal  Goal: *Tolerating diet  Outcome: Progressing Towards Goal  Goal: *Demonstrates progressive activity  Outcome: Progressing Towards Goal  Goal: *Fluid volume maintenance  Outcome: Progressing Towards Goal     Problem: Sepsis: Day 5  Goal: Off Pathway (Use only if patient is Off Pathway)  Outcome: Progressing Towards Goal  Goal: *Oxygen saturation within defined limits  Outcome: Progressing Towards Goal  Goal: *Vital signs within defined limits  Outcome: Progressing Towards Goal  Goal: *Tolerating diet  Outcome: Progressing Towards Goal  Goal: *Demonstrates progressive activity  Outcome: Progressing Towards Goal  Goal: *Discharge plan identified  Outcome: Progressing Towards Goal  Goal: Activity/Safety  Outcome: Progressing Towards Goal  Goal: Consults, if ordered  Outcome: Progressing Towards Goal  Goal: Diagnostic Test/Procedures  Outcome: Progressing Towards Goal  Goal: Nutrition/Diet  Outcome: Progressing Towards Goal  Goal: Discharge Planning  Outcome: Progressing Towards Goal  Goal: Medications  Outcome: Progressing Towards Goal  Goal: Respiratory  Outcome: Progressing Towards Goal  Goal: Treatments/Interventions/Procedures  Outcome: Progressing Towards Goal  Goal: Psychosocial  Outcome: Progressing Towards Goal     Problem: Sepsis: Day 6  Goal: Off Pathway (Use only if patient is Off Pathway)  Outcome: Progressing Towards Goal  Goal: *Oxygen saturation within defined limits  Outcome: Progressing Towards Goal  Goal: *Vital signs within defined limits  Outcome: Progressing Towards Goal  Goal: *Tolerating diet  Outcome: Progressing Towards Goal  Goal: *Demonstrates progressive activity  Outcome: Progressing Towards Goal  Goal: Influenza immunization  Outcome: Progressing Towards Goal  Goal: *Pneumococcal immunization  Outcome: Progressing Towards Goal  Goal: Activity/Safety  Outcome: Progressing Towards Goal  Goal: Diagnostic Test/Procedures  Outcome: Progressing Towards Goal  Goal: Nutrition/Diet  Outcome: Progressing Towards Goal  Goal: Discharge Planning  Outcome: Progressing Towards Goal  Goal: Medications  Outcome: Progressing Towards Goal  Goal: Respiratory  Outcome: Progressing Towards Goal  Goal: Treatments/Interventions/Procedures  Outcome: Progressing Towards Goal  Goal: Psychosocial  Outcome: Progressing Towards Goal     Problem: Sepsis: Discharge Outcomes  Goal: *Vital signs within defined limits  Outcome: Progressing Towards Goal  Goal: *Tolerating diet  Outcome: Progressing Towards Goal  Goal: *Verbalizes understanding and describes prescribed diet  Outcome: Progressing Towards Goal  Goal: *Demonstrates progressive activity  Outcome: Progressing Towards Goal  Goal: *Describes follow-up/return visits to physicians  Outcome: Progressing Towards Goal  Goal: *Verbalizes name, dosage, time, side effects, and number of days to continue medications  Outcome: Progressing Towards Goal  Goal: *Influenza immunization (Oct-Mar only)  Outcome: Progressing Towards Goal  Goal: *Pneumococcal immunization  Outcome: Progressing Towards Goal  Goal: *Lungs clear or at baseline  Outcome: Progressing Towards Goal  Goal: *Oxygen saturation returns to baseline or 90% or better on room air  Outcome: Progressing Towards Goal  Goal: *Glycemic control  Outcome: Progressing Towards Goal  Goal: *Absence of deep venous thrombosis signs and symptoms(Stroke Metric)  Outcome: Progressing Towards Goal  Goal: *Describes available resources and support systems  Outcome: Progressing Towards Goal  Goal: *Optimal pain control at patient's stated goal  Outcome: Progressing Towards Goal

## 2021-12-08 NOTE — ROUTINE PROCESS
Primary Nurse Latrell Montes RN and Estephania Ruth RN performed a dual skin assessment on this patient No impairment noted.

## 2021-12-08 NOTE — PROGRESS NOTES
Hospitalist Progress Note               Daily Progress Note: 12/8/2021      Subjective: The patient is seen for follow up. He is a 76-year-old male with is of hypertension, diabetes and hypothyroidism who presented to the ED on 12/6 with left flank pain associated with fever and chills along with nausea and vomiting. CT showed a 5 mm obstructing stone in the distal left ureter with left perinephric inflammation. Patient did meet sepsis criteria. He was started on ceftriaxone. Patient was admitted to ICU. He underwent cystoscopy with left ureteral stenting on 12/6  -----  Patient seen for follow-up on 12/7. Feeling much better. Labs today show a creatinine of 2.99 up from 2.19 on admission    Troponin is 376, down from 873.   He was seen by cardiology who feels this represents a type II non-STEMI    Subjective-patient seen and evaluated at bedside, patient currently has no active/new complaints, discussed with RN at bedside    Problem List:  Problem List as of 12/8/2021 Never Reviewed          Codes Class Noted - Resolved    Pyelonephritis ICD-10-CM: N12  ICD-9-CM: 590.80  12/6/2021 - Present              Medications reviewed  Current Facility-Administered Medications   Medication Dose Route Frequency    heparin (porcine) 25,000 units in 0.45% saline 250 ml infusion  9.8-25 Units/kg/hr (Adjusted) IntraVENous TITRATE    atorvastatin (LIPITOR) tablet 80 mg  80 mg Oral DAILY    heparin (porcine) 1,000 unit/mL injection 4,000 Units  4,000 Units IntraVENous PRN    Or    heparin (porcine) 1,000 unit/mL injection 2,000 Units  2,000 Units IntraVENous PRN    sodium chloride (NS) flush 5-40 mL  5-40 mL IntraVENous Q8H    sodium chloride (NS) flush 5-40 mL  5-40 mL IntraVENous PRN    acetaminophen (TYLENOL) tablet 650 mg  650 mg Oral Q6H PRN    Or    acetaminophen (TYLENOL) suppository 650 mg  650 mg Rectal Q6H PRN    polyethylene glycol (MIRALAX) packet 17 g  17 g Oral DAILY PRN    ondansetron (ZOFRAN ODT) tablet 4 mg  4 mg Oral Q8H PRN    Or    ondansetron (ZOFRAN) injection 4 mg  4 mg IntraVENous Q6H PRN    HYDROmorphone (DILAUDID) syringe 0.5 mg  0.5 mg IntraVENous Q4H PRN    sodium chloride (NS) flush 5-40 mL  5-40 mL IntraVENous Q8H    sodium chloride (NS) flush 5-40 mL  5-40 mL IntraVENous PRN    HYDROcodone-acetaminophen (NORCO) 5-325 mg per tablet 1 Tablet  1 Tablet Oral PRN    fentaNYL citrate (PF) injection 50 mcg  50 mcg IntraVENous Multiple    HYDROmorphone (DILAUDID) syringe 0.4 mg  0.4 mg IntraVENous Multiple    ondansetron (ZOFRAN) injection 4 mg  4 mg IntraVENous PRN    midazolam (VERSED) injection 0.5 mg  0.5 mg IntraVENous Q5MIN PRN    ePHEDrine sulfate (AKOVAZ) 50 mg/mL injection 5 mg  5 mg IntraVENous PRN    cefTRIAXone (ROCEPHIN) 2 g in 0.9% sodium chloride (MBP/ADV) 50 mL MBP  2 g IntraVENous ACB    glucose chewable tablet 16 g  4 Tablet Oral PRN    dextrose (D50W) injection syrg 12.5-25 g  25-50 mL IntraVENous PRN    glucagon (GLUCAGEN) injection 1 mg  1 mg IntraMUSCular PRN    insulin lispro (HUMALOG) injection   SubCUTAneous AC&HS    pantoprazole (PROTONIX) 40 mg in 0.9% sodium chloride 10 mL injection  40 mg IntraVENous Q12H    aspirin delayed-release tablet 81 mg  81 mg Oral DAILY       Review of Systems:   A comprehensive review of systems was negative except for that written in the HPI. Objective:   Physical Exam:     Visit Vitals  /79 (BP 1 Location: Right upper arm, BP Patient Position: At rest;Semi fowlers)   Pulse (!) 59   Temp 97.4 °F (36.3 °C)   Resp 16   Ht 6' 9\" (2.057 m)   Wt 107 kg (236 lb)   SpO2 95%   BMI 25.29 kg/m²    O2 Flow Rate (L/min): 3 l/min O2 Device: None (Room air)    Temp (24hrs), Av.8 °F (36.6 °C), Min:97.4 °F (36.3 °C), Max:98 °F (36.7 °C)    12/08 0701 - 1900  In: -   Out: 700 [Urine:700]   1901 - 700  In: 3982.5 [P.O.:720;  I.V.:3262.5]  Out: 950 [Urine:950]    General:   Awake and alert   Lungs: Clear to auscultation bilaterally. Chest wall:  No tenderness or deformity. Heart:  Regular rate and rhythm, S1, S2 normal, no murmur, click, rub or gallop. Abdomen:   Soft, non-tender. Bowel sounds normal. No masses,  No organomegaly. Extremities: Extremities normal, atraumatic, no cyanosis or edema. Pulses: 2+ and symmetric all extremities. Skin: Skin color, texture, turgor normal. No rashes or lesions   Neurologic: CNII-XII intact. No gross focal deficits         Data Review:       Recent Days:  Recent Labs     12/07/21  0525 12/06/21  1504   WBC 13.5* 2.2*   HGB 11.7* 13.4   HCT 35.5* 39.2   * 116*     Recent Labs     12/07/21  0525 12/06/21  1504   * 128*   K 4.1 4.0    96*   CO2 22 22   * 143*   BUN 49* 40*   CREA 2.99* 2.19*   CA 7.8* 8.5   MG 2.3  --    ALB 2.4* 2.9*   TBILI 0.8 1.0   ALT 46 49     No results for input(s): PH, PCO2, PO2, HCO3, FIO2 in the last 72 hours. 24 Hour Results:  Recent Results (from the past 24 hour(s))   GLUCOSE, POC    Collection Time: 12/07/21  4:10 PM   Result Value Ref Range    Glucose (POC) 125 (H) 65 - 117 mg/dL    Performed by [place] [address], POC    Collection Time: 12/07/21  8:39 PM   Result Value Ref Range    Glucose (POC) 135 (H) 65 - 117 mg/dL    Performed by Nix Annette    CK W/ REFLX CKMB    Collection Time: 12/07/21  9:35 PM   Result Value Ref Range    .0 (H) 39 - 308 ng/mL   PTT    Collection Time: 12/07/21  9:35 PM   Result Value Ref Range    aPTT 28.7 21.2 - 34.1 sec    aPTT, therapeutic range   82 - 109 sec   CK-MB,QUANT.     Collection Time: 12/07/21  9:35 PM   Result Value Ref Range    CK - MB 3.9 (H) <3.6 ng/mL    CK-MB Index 0.7     PTT    Collection Time: 12/08/21  6:46 AM   Result Value Ref Range    aPTT 29.4 21.2 - 34.1 sec    aPTT, therapeutic range   82 - 109 sec   GLUCOSE, POC    Collection Time: 12/08/21  7:56 AM   Result Value Ref Range    Glucose (POC) 150 (H) 65 - 117 mg/dL    Performed by Kashmir Peres Zeinab    GLUCOSE, POC    Collection Time: 12/08/21 11:12 AM   Result Value Ref Range    Glucose (POC) 139 (H) 65 - 117 mg/dL    Performed by Bhavya Ivey        XR FLUOROSCOPY UNDER 60 MINUTES   Final Result      CT ABD PELV WO CONT   Final Result   1. 5 x 5 mm obstructing stone in the distal left ureter. Left perinephric   inflammation. Correlate to medical renal disease to include renal dysfunction   and pyelonephritis. 2. Nonobstructing bilateral nephrolithiasis. 3. Thickening versus nondistention of the proximal duodenum. Correlate for any   clinical symptoms. 4. Trace bilateral pleural effusions. 5. Hepatomegaly. 6. Other findings above.       XR CHEST PORT   Final Result           Assessment:  Complicated UTI/pyelonephritis    Sepsis due to above    Acute kidney injury, likely combined postobstructive and ATN from sepsis    Obstructing left ureter stone, status post cystoscopy with stenting    Type II non-STEMI    Diabetes mellitus type 2    Hypertension    Hypothyroidism          Plan:    Sepsis secondary to complicated urinary tract infection/pyelonephritis-of note patient presented with above-mentioned symptomatology consistent with complicated urinary tract infection/pyelonephritis, patient remains hemodynamically stable at this time, remains afebrile, of note patient status post cystoscopy as well as left ureteral stent placement  Follow-up blood cultures  Follow-up urine cultures  Continue maintenance IV fluids  Continue ceftriaxone for antibiotic coverage  Urology consult appreciated, continue to follow recommendations    Non-ST elevation MI-patient presented with above-mentioned symptomatology found have a significant elevated serum troponin, initially thought to be type II non-ST elevation MI, of note transthoracic echo reviewed by cardiology attending, patient does have evidence of wall motion abnormality  Continue aspirin once daily  Continue heparin GTT for 48 hours of anticoagulation  Patient to receive cardiac catheterization once serum creatinine stabilizes  Cardiology consult appreciated, continue to follow recommendations    Hyperlipidemia-continue statin    Acute kidney injury-likely multifactorial, differentials include prerenal versus renal versus postrenal etiologies  Continue maintenance IV fluids  Continue to trend serum creatinine  Avoid nephrotoxic medications  Nephrology consult appreciated, continue to follow recommendations    Prophylaxis-Heparin GTT  FEN-cardiac diet, maintenance IV fluids, replete potassium and magnesium  Full code, will clarify about surrogate decision maker  Disposition-pending clinical improvement/cardiac catheterization/cardiac clearance, 24 to 48 hours depending on cardiac catheterization findings    Total non critical care time spent 35 mins    Gage Duong MD

## 2021-12-08 NOTE — PROGRESS NOTES
Renal Daily Progress Note    Admit Date: 12/6/2021      Subjective:   He has an indwelling Berry catheter. The pain is markedly better and is down in the groin. No vomiting. He denies diarrhea. No chest pain or palpitation. He is on IV heparin. He continues on IV fluids. He has undergone a total prostatectomy for prostate cancer.       Current Facility-Administered Medications   Medication Dose Route Frequency    [START ON 12/9/2021] pantoprazole (PROTONIX) tablet 40 mg  40 mg Oral ACB    0.9% sodium chloride infusion  100 mL/hr IntraVENous CONTINUOUS    tamsulosin (FLOMAX) capsule 0.4 mg  0.4 mg Oral DAILY    heparin (porcine) 25,000 units in 0.45% saline 250 ml infusion  9.8-25 Units/kg/hr (Adjusted) IntraVENous TITRATE    atorvastatin (LIPITOR) tablet 80 mg  80 mg Oral DAILY    heparin (porcine) 1,000 unit/mL injection 4,000 Units  4,000 Units IntraVENous PRN    Or    heparin (porcine) 1,000 unit/mL injection 2,000 Units  2,000 Units IntraVENous PRN    sodium chloride (NS) flush 5-40 mL  5-40 mL IntraVENous Q8H    sodium chloride (NS) flush 5-40 mL  5-40 mL IntraVENous PRN    acetaminophen (TYLENOL) tablet 650 mg  650 mg Oral Q6H PRN    Or    acetaminophen (TYLENOL) suppository 650 mg  650 mg Rectal Q6H PRN    polyethylene glycol (MIRALAX) packet 17 g  17 g Oral DAILY PRN    ondansetron (ZOFRAN ODT) tablet 4 mg  4 mg Oral Q8H PRN    Or    ondansetron (ZOFRAN) injection 4 mg  4 mg IntraVENous Q6H PRN    HYDROmorphone (DILAUDID) syringe 0.5 mg  0.5 mg IntraVENous Q4H PRN    sodium chloride (NS) flush 5-40 mL  5-40 mL IntraVENous Q8H    cefTRIAXone (ROCEPHIN) 2 g in 0.9% sodium chloride (MBP/ADV) 50 mL MBP  2 g IntraVENous ACB    glucose chewable tablet 16 g  4 Tablet Oral PRN    dextrose (D50W) injection syrg 12.5-25 g  25-50 mL IntraVENous PRN    glucagon (GLUCAGEN) injection 1 mg  1 mg IntraMUSCular PRN    insulin lispro (HUMALOG) injection   SubCUTAneous AC&HS    aspirin delayed-release tablet 81 mg  81 mg Oral DAILY        Review of Systems    Review of Systems   Constitutional: Negative for fever. Respiratory: Negative for cough and shortness of breath. Cardiovascular: Negative for chest pain and palpitations. Gastrointestinal: Negative for abdominal pain and vomiting. Neurological: Negative for dizziness and headaches. Objective:     Patient Vitals for the past 8 hrs:   BP Temp Pulse Resp SpO2   12/08/21 1427 138/80 98.1 °F (36.7 °C) 66 16 95 %   12/08/21 1200 -- -- (!) 59 -- --   12/08/21 1109 122/79 97.4 °F (36.3 °C) (!) 59 16 95 %   12/08/21 0800 -- -- (!) 57 -- --     12/08 0701 - 12/08 1900  In: 360 [P.O.:360]  Out: 700 [Urine:700]  12/06 1901 - 12/08 0700  In: 3982.5 [P.O.:720; I.V.:3262.5]  Out: 950 [Urine:950]    Physical Exam:   Physical Exam  Cardiovascular:      Rate and Rhythm: Regular rhythm. Pulmonary:      Breath sounds: Normal breath sounds. Abdominal:      Palpations: Abdomen is soft. Tenderness: There is no abdominal tenderness. Skin:     General: Skin is warm. Neurological:      General: No focal deficit present. Mental Status: He is alert. Urine is clear. XR FLUOROSCOPY UNDER 60 MINUTES   Final Result      CT ABD PELV WO CONT   Final Result   1. 5 x 5 mm obstructing stone in the distal left ureter. Left perinephric   inflammation. Correlate to medical renal disease to include renal dysfunction   and pyelonephritis. 2. Nonobstructing bilateral nephrolithiasis. 3. Thickening versus nondistention of the proximal duodenum. Correlate for any   clinical symptoms. 4. Trace bilateral pleural effusions. 5. Hepatomegaly. 6. Other findings above.       XR CHEST PORT   Final Result           Data Review   Recent Labs     12/08/21  1245 12/07/21  0525 12/06/21  1504   WBC 15.9* 13.5* 2.2*   HGB 12.3 11.7* 13.4   HCT 37.1 35.5* 39.2   * 100* 116*     Recent Labs     12/08/21  1245 12/07/21  0525 12/06/21  1504 NA  --  131* 128*   K  --  4.1 4.0   CL  --  100 96*   CO2  --  22 22   GLU  --  240* 143*   BUN 62* 49* 40*   CREA 3.47* 2.99* 2.19*   CA  --  7.8* 8.5   MG  --  2.3  --    ALB  --  2.4* 2.9*   ALT  --  46 49     No components found for: GLPOC  No results for input(s): PH, PCO2, PO2, HCO3, FIO2 in the last 72 hours. No results for input(s): INR, INREXT, INREXT in the last 72 hours. Assessment:           Active Problems:    Pyelonephritis (12/6/2021)    Acute kidney injury secondary to left renal hydronephrosis caused by renal stone    Hyponatremia secondary to pain caused by increased ADH secretion    Thrombocytopenia improving    Left-sided pyelonephritis cultures negative so thus far. Hypothyroidism on replacement    Atrial fibrillation on IV heparin. S/p internal urethrotomy. Plan:     Continue IV fluids. If indeed he has passed the stone, would expect the creatinine to be better.   Tamsulosin 0.4 mg daily

## 2021-12-08 NOTE — PROGRESS NOTES
Reason for Admission:  Pyelonephritis                     RUR Score:        9%             Plan for utilizing home health:      Not qualified at this time, works full time    PCP: First and Last name:  Unknown, Provider, MD     Name of Practice:    Are you a current patient: Yes/No:    Approximate date of last visit:    Can you participate in a virtual visit with your PCP:                     Current Advanced Directive/Advance Care Plan: Full Code      Healthcare Decision Maker:  Keaton Rebollar., [phone]  Click here to complete [address] including selection of the Healthcare Decision Maker Relationship (ie \"Primary\")                             Transition of Care Plan:      Home self care    Patient reports that he lives alone in a 2 story house and requires no DME to complete his ADL's and IADL's , done independently. Patient drives to his appts and reports no issues obtaining his meds. CM explained CM role, patient stated no CM needs at this time, works full time, not qualified for [facility]    CM continues to follow.

## 2021-12-09 ENCOUNTER — APPOINTMENT (OUTPATIENT)
Dept: GENERAL RADIOLOGY | Age: 61
DRG: 853 | End: 2021-12-09
Attending: INTERNAL MEDICINE
Payer: COMMERCIAL

## 2021-12-09 LAB
ANION GAP SERPL CALC-SCNC: 8 MMOL/L (ref 5–15)
APTT PPP: 28.6 SEC (ref 21.2–34.1)
APTT PPP: 46.8 SEC (ref 21.2–34.1)
BUN SERPL-MCNC: 64 MG/DL (ref 6–20)
BUN/CREAT SERPL: 19 (ref 12–20)
CA-I BLD-MCNC: 7.8 MG/DL (ref 8.5–10.1)
CHLORIDE SERPL-SCNC: 107 MMOL/L (ref 97–108)
CO2 SERPL-SCNC: 21 MMOL/L (ref 21–32)
CREAT SERPL-MCNC: 3.42 MG/DL (ref 0.7–1.3)
ERYTHROCYTE [DISTWIDTH] IN BLOOD BY AUTOMATED COUNT: 13.2 % (ref 11.5–14.5)
EST. AVERAGE GLUCOSE BLD GHB EST-MCNC: 128 MG/DL
GLUCOSE BLD STRIP.AUTO-MCNC: 109 MG/DL (ref 65–117)
GLUCOSE BLD STRIP.AUTO-MCNC: 120 MG/DL (ref 65–117)
GLUCOSE BLD STRIP.AUTO-MCNC: 121 MG/DL (ref 65–117)
GLUCOSE BLD STRIP.AUTO-MCNC: 97 MG/DL (ref 65–117)
GLUCOSE SERPL-MCNC: 108 MG/DL (ref 65–100)
HBA1C MFR BLD: 6.1 % (ref 4–5.6)
HCT VFR BLD AUTO: 36.8 % (ref 36.6–50.3)
HGB BLD-MCNC: 12.3 G/DL (ref 12.1–17)
MAGNESIUM SERPL-MCNC: 2.6 MG/DL (ref 1.6–2.4)
MCH RBC QN AUTO: 29.6 PG (ref 26–34)
MCHC RBC AUTO-ENTMCNC: 33.4 G/DL (ref 30–36.5)
MCV RBC AUTO: 88.5 FL (ref 80–99)
NRBC # BLD: 0 K/UL (ref 0–0.01)
NRBC BLD-RTO: 0 PER 100 WBC
PERFORMED BY, TECHID: ABNORMAL
PERFORMED BY, TECHID: ABNORMAL
PERFORMED BY, TECHID: NORMAL
PERFORMED BY, TECHID: NORMAL
PHOSPHATE SERPL-MCNC: 3.6 MG/DL (ref 2.6–4.7)
PLATELET # BLD AUTO: 141 K/UL (ref 150–400)
PMV BLD AUTO: 12.4 FL (ref 8.9–12.9)
POTASSIUM SERPL-SCNC: 3.8 MMOL/L (ref 3.5–5.1)
RBC # BLD AUTO: 4.16 M/UL (ref 4.1–5.7)
SODIUM SERPL-SCNC: 136 MMOL/L (ref 136–145)
THERAPEUTIC RANGE,PTTT: ABNORMAL SEC (ref 82–109)
THERAPEUTIC RANGE,PTTT: NORMAL SEC (ref 82–109)
URATE SERPL-MCNC: 7.8 MG/DL (ref 3.5–7.2)
WBC # BLD AUTO: 13.3 K/UL (ref 4.1–11.1)

## 2021-12-09 PROCEDURE — 74011250637 HC RX REV CODE- 250/637: Performed by: HOSPITALIST

## 2021-12-09 PROCEDURE — 82962 GLUCOSE BLOOD TEST: CPT

## 2021-12-09 PROCEDURE — 74011000258 HC RX REV CODE- 258: Performed by: INTERNAL MEDICINE

## 2021-12-09 PROCEDURE — 74011250637 HC RX REV CODE- 250/637: Performed by: INTERNAL MEDICINE

## 2021-12-09 PROCEDURE — 85730 THROMBOPLASTIN TIME PARTIAL: CPT

## 2021-12-09 PROCEDURE — 74011250636 HC RX REV CODE- 250/636: Performed by: INTERNAL MEDICINE

## 2021-12-09 PROCEDURE — 71045 X-RAY EXAM CHEST 1 VIEW: CPT

## 2021-12-09 PROCEDURE — 84100 ASSAY OF PHOSPHORUS: CPT

## 2021-12-09 PROCEDURE — 85027 COMPLETE CBC AUTOMATED: CPT

## 2021-12-09 PROCEDURE — 80048 BASIC METABOLIC PNL TOTAL CA: CPT

## 2021-12-09 PROCEDURE — 83735 ASSAY OF MAGNESIUM: CPT

## 2021-12-09 PROCEDURE — 84550 ASSAY OF BLOOD/URIC ACID: CPT

## 2021-12-09 PROCEDURE — 36415 COLL VENOUS BLD VENIPUNCTURE: CPT

## 2021-12-09 PROCEDURE — 74018 RADEX ABDOMEN 1 VIEW: CPT

## 2021-12-09 PROCEDURE — 65270000029 HC RM PRIVATE

## 2021-12-09 RX ORDER — POTASSIUM CHLORIDE 20 MEQ/1
20 TABLET, EXTENDED RELEASE ORAL
Status: COMPLETED | OUTPATIENT
Start: 2021-12-09 | End: 2021-12-09

## 2021-12-09 RX ORDER — HEPARIN SODIUM 5000 [USP'U]/ML
5000 INJECTION, SOLUTION INTRAVENOUS; SUBCUTANEOUS EVERY 8 HOURS
Status: DISCONTINUED | OUTPATIENT
Start: 2021-12-09 | End: 2021-12-21 | Stop reason: HOSPADM

## 2021-12-09 RX ORDER — SODIUM CHLORIDE 9 MG/ML
100 INJECTION, SOLUTION INTRAVENOUS CONTINUOUS
Status: DISCONTINUED | OUTPATIENT
Start: 2021-12-09 | End: 2021-12-09

## 2021-12-09 RX ADMIN — HYDROMORPHONE HYDROCHLORIDE 0.5 MG: 1 INJECTION, SOLUTION INTRAMUSCULAR; INTRAVENOUS; SUBCUTANEOUS at 10:28

## 2021-12-09 RX ADMIN — ONDANSETRON 4 MG: 2 INJECTION INTRAMUSCULAR; INTRAVENOUS at 20:58

## 2021-12-09 RX ADMIN — Medication 10 ML: at 15:25

## 2021-12-09 RX ADMIN — ASPIRIN 81 MG: 81 TABLET, COATED ORAL at 08:51

## 2021-12-09 RX ADMIN — POTASSIUM CHLORIDE 20 MEQ: 1500 TABLET, EXTENDED RELEASE ORAL at 11:37

## 2021-12-09 RX ADMIN — HYDROMORPHONE HYDROCHLORIDE 0.5 MG: 1 INJECTION, SOLUTION INTRAMUSCULAR; INTRAVENOUS; SUBCUTANEOUS at 20:59

## 2021-12-09 RX ADMIN — HYDROMORPHONE HYDROCHLORIDE 0.5 MG: 1 INJECTION, SOLUTION INTRAMUSCULAR; INTRAVENOUS; SUBCUTANEOUS at 15:25

## 2021-12-09 RX ADMIN — CEFTRIAXONE SODIUM 2 G: 2 INJECTION, POWDER, FOR SOLUTION INTRAMUSCULAR; INTRAVENOUS at 08:52

## 2021-12-09 RX ADMIN — HEPARIN SODIUM 2000 UNITS: 1000 INJECTION, SOLUTION INTRAVENOUS; SUBCUTANEOUS at 06:43

## 2021-12-09 RX ADMIN — ATORVASTATIN CALCIUM 80 MG: 40 TABLET, FILM COATED ORAL at 08:51

## 2021-12-09 RX ADMIN — TAMSULOSIN HYDROCHLORIDE 0.4 MG: 0.4 CAPSULE ORAL at 08:52

## 2021-12-09 RX ADMIN — HEPARIN SODIUM 5000 UNITS: 5000 INJECTION INTRAVENOUS; SUBCUTANEOUS at 20:54

## 2021-12-09 RX ADMIN — ACETAMINOPHEN 650 MG: 325 TABLET ORAL at 20:55

## 2021-12-09 RX ADMIN — Medication 5 ML: at 06:47

## 2021-12-09 RX ADMIN — PANTOPRAZOLE SODIUM 40 MG: 40 TABLET, DELAYED RELEASE ORAL at 08:52

## 2021-12-09 RX ADMIN — HEPARIN SODIUM 5000 UNITS: 5000 INJECTION INTRAVENOUS; SUBCUTANEOUS at 11:37

## 2021-12-09 NOTE — PROGRESS NOTES
Renal Daily Progress Note    Admit Date: 12/6/2021      Subjective:   He is complaining of abdominal pain on both sides. He still has an indwelling Berry catheter. Urine looks clean. He is nauseated. He has not eaten any today. Current Facility-Administered Medications   Medication Dose Route Frequency    heparin (porcine) injection 5,000 Units  5,000 Units SubCUTAneous Q8H    pantoprazole (PROTONIX) tablet 40 mg  40 mg Oral ACB    tamsulosin (FLOMAX) capsule 0.4 mg  0.4 mg Oral DAILY    atorvastatin (LIPITOR) tablet 80 mg  80 mg Oral DAILY    sodium chloride (NS) flush 5-40 mL  5-40 mL IntraVENous Q8H    sodium chloride (NS) flush 5-40 mL  5-40 mL IntraVENous PRN    acetaminophen (TYLENOL) tablet 650 mg  650 mg Oral Q6H PRN    Or    acetaminophen (TYLENOL) suppository 650 mg  650 mg Rectal Q6H PRN    polyethylene glycol (MIRALAX) packet 17 g  17 g Oral DAILY PRN    ondansetron (ZOFRAN ODT) tablet 4 mg  4 mg Oral Q8H PRN    Or    ondansetron (ZOFRAN) injection 4 mg  4 mg IntraVENous Q6H PRN    HYDROmorphone (DILAUDID) syringe 0.5 mg  0.5 mg IntraVENous Q4H PRN    sodium chloride (NS) flush 5-40 mL  5-40 mL IntraVENous Q8H    cefTRIAXone (ROCEPHIN) 2 g in 0.9% sodium chloride (MBP/ADV) 50 mL MBP  2 g IntraVENous ACB    glucose chewable tablet 16 g  4 Tablet Oral PRN    dextrose (D50W) injection syrg 12.5-25 g  25-50 mL IntraVENous PRN    glucagon (GLUCAGEN) injection 1 mg  1 mg IntraMUSCular PRN    insulin lispro (HUMALOG) injection   SubCUTAneous AC&HS    aspirin delayed-release tablet 81 mg  81 mg Oral DAILY        Review of Systems    Review of Systems   Constitutional: Negative for fever. Respiratory: Negative for cough and shortness of breath. Cardiovascular: Negative for chest pain and palpitations. Gastrointestinal: Positive for abdominal pain. Negative for vomiting. Neurological: Negative for dizziness and headaches.           Objective:     Patient Vitals for the past 8 hrs:   BP Temp Pulse Resp SpO2   12/09/21 1215 (!) 166/94 98 °F (36.7 °C) 62 18 96 %   12/09/21 0830 -- -- -- -- 96 %   12/09/21 0735 (!) 148/87 98 °F (36.7 °C) 61 16 95 %     12/09 0701 - 12/09 1900  In: -   Out: 950 [Urine:950]  12/07 1901 - 12/09 0700  In: 360 [P.O.:360]  Out: 2800 [Urine:2800]    Physical Exam:   Physical Exam  Cardiovascular:      Rate and Rhythm: Regular rhythm. Pulmonary:      Breath sounds: Normal breath sounds. Abdominal:      Palpations: Abdomen is soft. Tenderness: There is abdominal tenderness. Skin:     General: Skin is warm. Neurological:      General: No focal deficit present. Mental Status: He is alert. Urine is clear. No rebound tenderness. He is tender in both flanks and in the left lower quadrant of his abdomen. Bowel sounds were present. XR CHEST PORT   Final Result   No acute findings. XR FLUOROSCOPY UNDER 60 MINUTES   Final Result      CT ABD PELV WO CONT   Final Result   1. 5 x 5 mm obstructing stone in the distal left ureter. Left perinephric   inflammation. Correlate to medical renal disease to include renal dysfunction   and pyelonephritis. 2. Nonobstructing bilateral nephrolithiasis. 3. Thickening versus nondistention of the proximal duodenum. Correlate for any   clinical symptoms. 4. Trace bilateral pleural effusions. 5. Hepatomegaly. 6. Other findings above.       XR CHEST PORT   Final Result      XR ABD (KUB)    (Results Pending)        Data Review   Recent Labs     12/09/21  0550 12/08/21  1245 12/07/21  0525   WBC 13.3* 15.9* 13.5*   HGB 12.3 12.3 11.7*   HCT 36.8 37.1 35.5*   * 127* 100*     Recent Labs     12/09/21  0550 12/08/21  1245 12/07/21  0525 12/06/21  1504 12/06/21  1504     --  131*  --  128*   K 3.8  --  4.1  --  4.0     --  100  --  96*   CO2 21  --  22  --  22   *  --  240*  --  143*   BUN 64* 62* 49*   < > 40*   CREA 3.42* 3.47* 2.99*   < > 2.19*   CA 7.8*  --  7.8*  --  8.5   MG 2.6*  --  2.3  --   --    PHOS 3.6  --   --   --   --    ALB  --   --  2.4*  --  2.9*   ALT  --   --  46  --  49    < > = values in this interval not displayed. No components found for: GLPOC  No results for input(s): PH, PCO2, PO2, HCO3, FIO2 in the last 72 hours. No results for input(s): INR, INREXT, INREXT, INREXT in the last 72 hours. Assessment:           Active Problems:    Pyelonephritis (12/6/2021)    Acute kidney injury secondary to left renal hydronephrosis caused by renal stone  Renal function is stable. Hyponatremia secondary to pain caused by increased ADH secretion. -Resolved    Thrombocytopenia improving    Left-sided pyelonephritis cultures negative so thus far. Do not believe that he has pyelonephritis with negative cultures. Hypothyroidism on replacement    Atrial fibrillation on IV heparin. S/p internal urethrotomy. Plan:     Continue IV fluids. Check a KUB x-ray  Creatinine has reached a plateau. If there is no further obstruction to the ureters expect improvement in the renal function with continued hydration.

## 2021-12-09 NOTE — PROGRESS NOTES
Cardiology Progress Note      12/9/2021 10:38 AM    Admit Date: 12/6/2021    Admit Diagnosis: Pyelonephritis [N12]      Subjective:   Patient seen and examined. He remains chest pain-free. No overnight events.     Visit Vitals  BP (!) 148/87 (BP 1 Location: Right upper arm, BP Patient Position: At rest;Semi fowlers)   Pulse 61   Temp 98 °F (36.7 °C)   Resp 16   Ht 6' 9\" (2.057 m)   Wt 107 kg (236 lb)   SpO2 95%   BMI 25.29 kg/m²     Current Facility-Administered Medications   Medication Dose Route Frequency    potassium chloride (K-DUR, KLOR-CON M20) SR tablet 20 mEq  20 mEq Oral NOW    heparin (porcine) injection 5,000 Units  5,000 Units SubCUTAneous Q8H    pantoprazole (PROTONIX) tablet 40 mg  40 mg Oral ACB    tamsulosin (FLOMAX) capsule 0.4 mg  0.4 mg Oral DAILY    atorvastatin (LIPITOR) tablet 80 mg  80 mg Oral DAILY    sodium chloride (NS) flush 5-40 mL  5-40 mL IntraVENous Q8H    sodium chloride (NS) flush 5-40 mL  5-40 mL IntraVENous PRN    acetaminophen (TYLENOL) tablet 650 mg  650 mg Oral Q6H PRN    Or    acetaminophen (TYLENOL) suppository 650 mg  650 mg Rectal Q6H PRN    polyethylene glycol (MIRALAX) packet 17 g  17 g Oral DAILY PRN    ondansetron (ZOFRAN ODT) tablet 4 mg  4 mg Oral Q8H PRN    Or    ondansetron (ZOFRAN) injection 4 mg  4 mg IntraVENous Q6H PRN    HYDROmorphone (DILAUDID) syringe 0.5 mg  0.5 mg IntraVENous Q4H PRN    sodium chloride (NS) flush 5-40 mL  5-40 mL IntraVENous Q8H    cefTRIAXone (ROCEPHIN) 2 g in 0.9% sodium chloride (MBP/ADV) 50 mL MBP  2 g IntraVENous ACB    glucose chewable tablet 16 g  4 Tablet Oral PRN    dextrose (D50W) injection syrg 12.5-25 g  25-50 mL IntraVENous PRN    glucagon (GLUCAGEN) injection 1 mg  1 mg IntraMUSCular PRN    insulin lispro (HUMALOG) injection   SubCUTAneous AC&HS    aspirin delayed-release tablet 81 mg  81 mg Oral DAILY         Objective:      Physical Exam:  Visit Vitals  BP (!) 148/87 (BP 1 Location: Right upper arm, BP Patient Position: At rest;Semi fowlers)   Pulse 61   Temp 98 °F (36.7 °C)   Resp 16   Ht 6' 9\" (2.057 m)   Wt 107 kg (236 lb)   SpO2 95%   BMI 25.29 kg/m²     General Appearance:  Well developed, well nourished,alert and oriented x 3, and individual in no acute distress. Ears/Nose/Mouth/Throat:   Hearing grossly normal.         Neck: Supple. Chest:   Lungs clear to auscultation bilaterally. Cardiovascular:  Regular rate and rhythm, S1, S2 normal, no murmur. Abdomen:   Soft, non-tender, bowel sounds are active. Extremities: No edema bilaterally. Skin: Warm and dry.                Data Review:   Labs:    Recent Results (from the past 24 hour(s))   PTT    Collection Time: 12/08/21 11:55 AM   Result Value Ref Range    aPTT 28.5 21.2 - 34.1 sec    aPTT, therapeutic range   82 - 109 sec   BUN    Collection Time: 12/08/21 12:45 PM   Result Value Ref Range    BUN 62 (H) 6 - 20 mg/dL   CREATININE    Collection Time: 12/08/21 12:45 PM   Result Value Ref Range    Creatinine 3.47 (H) 0.70 - 1.30 mg/dL   TROPONIN-HIGH SENSITIVITY    Collection Time: 12/08/21 12:45 PM   Result Value Ref Range    Troponin-High Sensitivity 61 0 - 76 ng/L   CBC W/O DIFF    Collection Time: 12/08/21 12:45 PM   Result Value Ref Range    WBC 15.9 (H) 4.1 - 11.1 K/uL    RBC 4.16 4.10 - 5.70 M/uL    HGB 12.3 12.1 - 17.0 g/dL    HCT 37.1 36.6 - 50.3 %    MCV 89.2 80.0 - 99.0 FL    MCH 29.6 26.0 - 34.0 PG    MCHC 33.2 30.0 - 36.5 g/dL    RDW 13.2 11.5 - 14.5 %    PLATELET 669 (L) 551 - 400 K/uL    NRBC 0.0 0.0  WBC    ABSOLUTE NRBC 0.00 0.00 - 0.01 K/uL   GLUCOSE, POC    Collection Time: 12/08/21  3:59 PM   Result Value Ref Range    Glucose (POC) 163 (H) 65 - 117 mg/dL    Performed by Christo 30, POC    Collection Time: 12/08/21  8:50 PM   Result Value Ref Range    Glucose (POC) 123 (H) 65 - 117 mg/dL    Performed by Mitchell Liu (PCT)    PTT    Collection Time: 12/08/21  8:51 PM   Result Value Ref Range    aPTT 31.1 21.2 - 34.1 sec    aPTT, therapeutic range   82 - 109 sec   CBC W/O DIFF    Collection Time: 12/09/21  5:50 AM   Result Value Ref Range    WBC 13.3 (H) 4.1 - 11.1 K/uL    RBC 4.16 4.10 - 5.70 M/uL    HGB 12.3 12.1 - 17.0 g/dL    HCT 36.8 36.6 - 50.3 %    MCV 88.5 80.0 - 99.0 FL    MCH 29.6 26.0 - 34.0 PG    MCHC 33.4 30.0 - 36.5 g/dL    RDW 13.2 11.5 - 14.5 %    PLATELET 899 (L) 287 - 400 K/uL    MPV 12.4 8.9 - 12.9 FL    NRBC 0.0 0.0  WBC    ABSOLUTE NRBC 0.00 0.00 - 2.12 K/uL   METABOLIC PANEL, BASIC    Collection Time: 12/09/21  5:50 AM   Result Value Ref Range    Sodium 136 136 - 145 mmol/L    Potassium 3.8 3.5 - 5.1 mmol/L    Chloride 107 97 - 108 mmol/L    CO2 21 21 - 32 mmol/L    Anion gap 8 5 - 15 mmol/L    Glucose 108 (H) 65 - 100 mg/dL    BUN 64 (H) 6 - 20 mg/dL    Creatinine 3.42 (H) 0.70 - 1.30 mg/dL    BUN/Creatinine ratio 19 12 - 20      GFR est AA 22 (L) >60 ml/min/1.73m2    GFR est non-AA 18 (L) >60 ml/min/1.73m2    Calcium 7.8 (L) 8.5 - 10.1 mg/dL   MAGNESIUM    Collection Time: 12/09/21  5:50 AM   Result Value Ref Range    Magnesium 2.6 (H) 1.6 - 2.4 mg/dL   URIC ACID    Collection Time: 12/09/21  5:50 AM   Result Value Ref Range    Uric acid 7.8 (H) 3.5 - 7.2 mg/dL   PHOSPHORUS    Collection Time: 12/09/21  5:50 AM   Result Value Ref Range    Phosphorus 3.6 2.6 - 4.7 mg/dL   PTT    Collection Time: 12/09/21  5:50 AM   Result Value Ref Range    aPTT 46.8 (H) 21.2 - 34.1 sec    aPTT, therapeutic range   82 - 109 sec   GLUCOSE, POC    Collection Time: 12/09/21  7:33 AM   Result Value Ref Range    Glucose (POC) 97 65 - 117 mg/dL    Performed by Glendora Dance        Telemetry: normal sinus rhythm      Assessment:   NSTEMI type II  Pyelonephritis status post ureteral stent  Acute kidney injury  Diabetes mellitus  Hypertension  Hyperlipidemia    Plan:   26-year-old male with a past medical history as above who is seen for elevated troponins after undergoing ureteral stent placement.  -Patient has had no clear anginal symptoms but he has significantly elevated troponins which are now downtrending. I reviewed his transthoracic echocardiogram which shows mild LV dysfunction with lateral, anterolateral, inferolateral wall motion abnormality consistent with coronary artery disease in the left circumflex/right coronary artery territory  -Kidney function has worsened and creatinine is 3.4. He is being followed by nephrology  -Continue heparin infusion for 48 hours  -Continue aspirin, high intensity statin. Add beta-blockers as hemodynamically able.   Resting heart rate is in the 50s hence I am reluctant to start this yet  -Once his presenting problems have stabilized and renal status has been optimized, he will need cardiac catheterization for further evaluation  -We will continue to follow the patient and give further recommendations pending his clinical course.     Thank you for allowing us to participate in the care of your patient Implemented All Universal Safety Interventions:  [place] to call system. Call bell, personal items and telephone within reach. Instruct patient to call for assistance. Room bathroom lighting operational. Non-slip footwear when patient is off stretcher. Physically safe environment: no spills, clutter or unnecessary equipment. Stretcher in lowest position, wheels locked, appropriate side rails in place.

## 2021-12-09 NOTE — PROGRESS NOTES
Hospitalist Progress Note               Daily Progress Note: 12/9/2021      Subjective: The patient is seen for follow up. He is a 64-year-old male with is of hypertension, diabetes and hypothyroidism who presented to the ED on 12/6 with left flank pain associated with fever and chills along with nausea and vomiting. CT showed a 5 mm obstructing stone in the distal left ureter with left perinephric inflammation. Patient did meet sepsis criteria. He was started on ceftriaxone. Patient was admitted to ICU. He underwent cystoscopy with left ureteral stenting on 12/6  -----  Patient seen for follow-up on 12/7. Feeling much better. Labs today show a creatinine of 2.99 up from 2.19 on admission    Troponin is 376, down from 873.   He was seen by cardiology who feels this represents a type II non-STEMI    Subjective-patient seen and evaluated at bedside, patient currently has no active/new complaints, discussed with RN at bedside    Problem List:  Problem List as of 12/9/2021 Never Reviewed          Codes Class Noted - Resolved    Pyelonephritis ICD-10-CM: N12  ICD-9-CM: 590.80  12/6/2021 - Present              Medications reviewed  Current Facility-Administered Medications   Medication Dose Route Frequency    potassium chloride (K-DUR, KLOR-CON M20) SR tablet 20 mEq  20 mEq Oral NOW    pantoprazole (PROTONIX) tablet 40 mg  40 mg Oral ACB    tamsulosin (FLOMAX) capsule 0.4 mg  0.4 mg Oral DAILY    atorvastatin (LIPITOR) tablet 80 mg  80 mg Oral DAILY    sodium chloride (NS) flush 5-40 mL  5-40 mL IntraVENous Q8H    sodium chloride (NS) flush 5-40 mL  5-40 mL IntraVENous PRN    acetaminophen (TYLENOL) tablet 650 mg  650 mg Oral Q6H PRN    Or    acetaminophen (TYLENOL) suppository 650 mg  650 mg Rectal Q6H PRN    polyethylene glycol (MIRALAX) packet 17 g  17 g Oral DAILY PRN    ondansetron (ZOFRAN ODT) tablet 4 mg  4 mg Oral Q8H PRN    Or    ondansetron (ZOFRAN) injection 4 mg  4 mg IntraVENous Q6H PRN    HYDROmorphone (DILAUDID) syringe 0.5 mg  0.5 mg IntraVENous Q4H PRN    sodium chloride (NS) flush 5-40 mL  5-40 mL IntraVENous Q8H    cefTRIAXone (ROCEPHIN) 2 g in 0.9% sodium chloride (MBP/ADV) 50 mL MBP  2 g IntraVENous ACB    glucose chewable tablet 16 g  4 Tablet Oral PRN    dextrose (D50W) injection syrg 12.5-25 g  25-50 mL IntraVENous PRN    glucagon (GLUCAGEN) injection 1 mg  1 mg IntraMUSCular PRN    insulin lispro (HUMALOG) injection   SubCUTAneous AC&HS    aspirin delayed-release tablet 81 mg  81 mg Oral DAILY       Review of Systems:   A comprehensive review of systems was negative except for that written in the HPI. Objective:   Physical Exam:     Visit Vitals  BP (!) 148/87 (BP 1 Location: Right upper arm, BP Patient Position: At rest;Semi fowlers)   Pulse 61   Temp 98 °F (36.7 °C)   Resp 16   Ht 6' 9\" (2.057 m)   Wt 107 kg (236 lb)   SpO2 95%   BMI 25.29 kg/m²    O2 Flow Rate (L/min): 3 l/min O2 Device: None (Room air)    Temp (24hrs), Av.9 °F (36.6 °C), Min:97.4 °F (36.3 °C), Max:98.1 °F (36.7 °C)    No intake/output data recorded.  1901 -  0700  In: 360 [P.O.:360]  Out: 2800 [Urine:2800]    General:   Awake and alert   Lungs:   Clear to auscultation bilaterally. Chest wall:  No tenderness or deformity. Heart:  Regular rate and rhythm, S1, S2 normal, no murmur, click, rub or gallop. Abdomen:   Soft, non-tender. Bowel sounds normal. No masses,  No organomegaly. Extremities: Extremities normal, atraumatic, no cyanosis or edema. Pulses: 2+ and symmetric all extremities. Skin: Skin color, texture, turgor normal. No rashes or lesions   Neurologic: CNII-XII intact.   No gross focal deficits         Data Review:       Recent Days:  Recent Labs     21  0550 21  1245 21  0525   WBC 13.3* 15.9* 13.5*   HGB 12.3 12.3 11.7*   HCT 36.8 37.1 35.5*   * 127* 100*     Recent Labs     21  0550 21  1245 21  0525 21  1504 12/06/21  1504     --  131*  --  128*   K 3.8  --  4.1  --  4.0     --  100  --  96*   CO2 21  --  22  --  22   *  --  240*  --  143*   BUN 64* 62* 49*   < > 40*   CREA 3.42* 3.47* 2.99*   < > 2.19*   CA 7.8*  --  7.8*  --  8.5   MG 2.6*  --  2.3  --   --    PHOS 3.6  --   --   --   --    ALB  --   --  2.4*  --  2.9*   TBILI  --   --  0.8  --  1.0   ALT  --   --  46  --  49    < > = values in this interval not displayed. No results for input(s): PH, PCO2, PO2, HCO3, FIO2 in the last 72 hours.     24 Hour Results:  Recent Results (from the past 24 hour(s))   GLUCOSE, POC    Collection Time: 12/08/21 11:12 AM   Result Value Ref Range    Glucose (POC) 139 (H) 65 - 117 mg/dL    Performed by Tomas Elena    PTT    Collection Time: 12/08/21 11:55 AM   Result Value Ref Range    aPTT 28.5 21.2 - 34.1 sec    aPTT, therapeutic range   82 - 109 sec   BUN    Collection Time: 12/08/21 12:45 PM   Result Value Ref Range    BUN 62 (H) 6 - 20 mg/dL   CREATININE    Collection Time: 12/08/21 12:45 PM   Result Value Ref Range    Creatinine 3.47 (H) 0.70 - 1.30 mg/dL   TROPONIN-HIGH SENSITIVITY    Collection Time: 12/08/21 12:45 PM   Result Value Ref Range    Troponin-High Sensitivity 61 0 - 76 ng/L   CBC W/O DIFF    Collection Time: 12/08/21 12:45 PM   Result Value Ref Range    WBC 15.9 (H) 4.1 - 11.1 K/uL    RBC 4.16 4.10 - 5.70 M/uL    HGB 12.3 12.1 - 17.0 g/dL    HCT 37.1 36.6 - 50.3 %    MCV 89.2 80.0 - 99.0 FL    MCH 29.6 26.0 - 34.0 PG    MCHC 33.2 30.0 - 36.5 g/dL    RDW 13.2 11.5 - 14.5 %    PLATELET 753 (L) 383 - 400 K/uL    NRBC 0.0 0.0  WBC    ABSOLUTE NRBC 0.00 0.00 - 0.01 K/uL   GLUCOSE, POC    Collection Time: 12/08/21  3:59 PM   Result Value Ref Range    Glucose (POC) 163 (H) 65 - 117 mg/dL    Performed by Christo 30, POC    Collection Time: 12/08/21  8:50 PM   Result Value Ref Range    Glucose (POC) 123 (H) 65 - 117 mg/dL    Performed by Yaquelin Ness (PCT)    PTT Collection Time: 12/08/21  8:51 PM   Result Value Ref Range    aPTT 31.1 21.2 - 34.1 sec    aPTT, therapeutic range   82 - 109 sec   CBC W/O DIFF    Collection Time: 12/09/21  5:50 AM   Result Value Ref Range    WBC 13.3 (H) 4.1 - 11.1 K/uL    RBC 4.16 4.10 - 5.70 M/uL    HGB 12.3 12.1 - 17.0 g/dL    HCT 36.8 36.6 - 50.3 %    MCV 88.5 80.0 - 99.0 FL    MCH 29.6 26.0 - 34.0 PG    MCHC 33.4 30.0 - 36.5 g/dL    RDW 13.2 11.5 - 14.5 %    PLATELET 253 (L) 916 - 400 K/uL    MPV 12.4 8.9 - 12.9 FL    NRBC 0.0 0.0  WBC    ABSOLUTE NRBC 0.00 0.00 - 7.58 K/uL   METABOLIC PANEL, BASIC    Collection Time: 12/09/21  5:50 AM   Result Value Ref Range    Sodium 136 136 - 145 mmol/L    Potassium 3.8 3.5 - 5.1 mmol/L    Chloride 107 97 - 108 mmol/L    CO2 21 21 - 32 mmol/L    Anion gap 8 5 - 15 mmol/L    Glucose 108 (H) 65 - 100 mg/dL    BUN 64 (H) 6 - 20 mg/dL    Creatinine 3.42 (H) 0.70 - 1.30 mg/dL    BUN/Creatinine ratio 19 12 - 20      GFR est AA 22 (L) >60 ml/min/1.73m2    GFR est non-AA 18 (L) >60 ml/min/1.73m2    Calcium 7.8 (L) 8.5 - 10.1 mg/dL   MAGNESIUM    Collection Time: 12/09/21  5:50 AM   Result Value Ref Range    Magnesium 2.6 (H) 1.6 - 2.4 mg/dL   URIC ACID    Collection Time: 12/09/21  5:50 AM   Result Value Ref Range    Uric acid 7.8 (H) 3.5 - 7.2 mg/dL   PHOSPHORUS    Collection Time: 12/09/21  5:50 AM   Result Value Ref Range    Phosphorus 3.6 2.6 - 4.7 mg/dL   PTT    Collection Time: 12/09/21  5:50 AM   Result Value Ref Range    aPTT 46.8 (H) 21.2 - 34.1 sec    aPTT, therapeutic range   82 - 109 sec   GLUCOSE, POC    Collection Time: 12/09/21  7:33 AM   Result Value Ref Range    Glucose (POC) 97 65 - 117 mg/dL    Performed by Alicia ABDUL FLUOROSCOPY UNDER 60 MINUTES   Final Result      CT ABD PELV WO CONT   Final Result   1. 5 x 5 mm obstructing stone in the distal left ureter. Left perinephric   inflammation. Correlate to medical renal disease to include renal dysfunction   and pyelonephritis. 2. Nonobstructing bilateral nephrolithiasis. 3. Thickening versus nondistention of the proximal duodenum. Correlate for any   clinical symptoms. 4. Trace bilateral pleural effusions. 5. Hepatomegaly. 6. Other findings above.       XR CHEST PORT   Final Result      XR CHEST PORT    (Results Pending)        Assessment:  Complicated UTI/pyelonephritis    Sepsis due to above    Acute kidney injury, likely combined postobstructive and ATN from sepsis    Obstructing left ureter stone, status post cystoscopy with stenting    Type II non-STEMI    Diabetes mellitus type 2    Hypertension    Hypothyroidism          Plan:    Sepsis secondary to complicated urinary tract infection/pyelonephritis-of note patient presented with above-mentioned symptomatology consistent with complicated urinary tract infection/pyelonephritis, patient remains hemodynamically stable at this time, remains afebrile, of note patient status post cystoscopy as well as left ureteral stent placement  Follow-up blood cultures  Follow-up urine cultures  Continue maintenance IV fluids  Continue ceftriaxone for antibiotic coverage  Urology consult appreciated, continue to follow recommendations    Non-ST elevation MI-patient presented with above-mentioned symptomatology found have a significant elevated serum troponin, initially thought to be type II non-ST elevation MI, of note transthoracic echo reviewed by cardiology attending, patient does have evidence of wall motion abnormality  Continue aspirin once daily  Completed heparin GTT  Patient to receive cardiac catheterization once serum creatinine stabilizes  Cardiology consult appreciated, continue to follow recommendations    Hyperlipidemia-continue statin    Acute kidney injury-likely multifactorial, differentials include prerenal versus renal versus postrenal etiologies  Continue maintenance IV fluids  Continue to trend serum creatinine  Avoid nephrotoxic medications  Nephrology consult appreciated, continue to follow recommendations    Prophylaxis-Heparin Subcu  FEN-cardiac diet, maintenance IV fluids, replete potassium and magnesium  Full code, will clarify about surrogate decision maker  Disposition-pending clinical improvement/cardiac catheterization/cardiac clearance, 24 to 48 hours depending on cardiac catheterization findings    Total non critical care time spent 35 mins    Dipika Monreal MD

## 2021-12-09 NOTE — PROGRESS NOTES
Physician Progress Note      PATIENTLinmickie Vinson  CSN #:                  [id number]  :                       1960  ADMIT DATE:       2021 2:36 PM  [address] [place] DATE:  RESPONDING  PROVIDER #:        Juancho Owens MD          QUERY TEXT:    Dear Dr. Ronnell Quintana -    Pt admitted with sepsis, FERMIN-ATN, pyelonephritis, NSTEMI type 2. Pt noted to have hypoxia requiring supplemental 02 via NC. If possible, please document in the progress notes and discharge summary if you are evaluating and/or treating any of the following: The medical record reflects the following:  Risk Factors: 59 M referred to ED by [facility] for pyelonephritis and FERMIN; admitted with sepsis  Clinical Indicators: 02 sats 90% on RA; patient placed on 02 4lpm NC with sats 96%; CT scan notes trace bilateral pleural effusions; patient has since been weaned to room air with sats 96-99%  Treatment: labs, CXR, treatment of sepsis, pyelonephritis, IV ABT    Thank you,  YAHIR NevilleN, RN, [place]  Clinical   [phone]  Options provided:  -- Acute respiratory failure with hypoxia  -- Acute respiratory failure with hypercapnia  -- Acute respiratory failure with hypoxia and hypercapnia  -- Other - I will add my own diagnosis  -- Disagree - Not applicable / Not valid  -- Disagree - Clinically unable to determine / Unknown  -- Refer to Clinical Documentation Reviewer    PROVIDER RESPONSE TEXT:    Provider disagreed with this query.     Query created by: Zhanna Cool on 2021 1:15 PM      Electronically signed by:  Juancho Owens MD 2021 2:18 PM

## 2021-12-09 NOTE — PROGRESS NOTES
Problem: Falls - Risk of  Goal: *Absence of Falls  Description: Document Linda Cormier Fall Risk and appropriate interventions in the flowsheet.   Outcome: Progressing Towards Goal  Note: Fall Risk Interventions:            Medication Interventions: Teach patient to arise slowly    Elimination Interventions: Call light in reach, Toileting schedule/hourly rounds              Problem: Patient Education: Go to Patient Education Activity  Goal: Patient/Family Education  Outcome: Progressing Towards Goal     Problem: Patient Education: Go to Patient Education Activity  Goal: Patient/Family Education  Outcome: Progressing Towards Goal     Problem: Sepsis: Day of Diagnosis  Goal: Off Pathway (Use only if patient is Off Pathway)  Outcome: Progressing Towards Goal  Goal: *Fluid resuscitation  Outcome: Progressing Towards Goal  Goal: *Paired blood cultures prior to first dose of antibiotic  Outcome: Progressing Towards Goal  Goal: *First dose of  appropriate antibiotic within 3 hours of arrival to ED, within 1 hour of arrival to ICU  Outcome: Progressing Towards Goal  Goal: *Lactic acid with first set of blood cultures  Outcome: Progressing Towards Goal  Goal: *Pneumococcal immunization (if eligible)  Outcome: Progressing Towards Goal  Goal: *Influenza immunization (if eligible)  Outcome: Progressing Towards Goal  Goal: Activity/Safety  Outcome: Progressing Towards Goal  Goal: Consults, if ordered  Outcome: Progressing Towards Goal  Goal: Diagnostic Test/Procedures  Outcome: Progressing Towards Goal  Goal: Nutrition/Diet  Outcome: Progressing Towards Goal  Goal: Discharge Planning  Outcome: Progressing Towards Goal  Goal: Medications  Outcome: Progressing Towards Goal  Goal: Respiratory  Outcome: Progressing Towards Goal  Goal: Treatments/Interventions/Procedures  Outcome: Progressing Towards Goal  Goal: Psychosocial  Outcome: Progressing Towards Goal     Problem: Patient Education: Go to Patient Education Activity  Goal: Patient/Family Education  Outcome: Progressing Towards Goal     Problem: Sepsis: Day of Diagnosis  Goal: Off Pathway (Use only if patient is Off Pathway)  Outcome: Progressing Towards Goal  Goal: *Fluid resuscitation  Outcome: Progressing Towards Goal  Goal: *Paired blood cultures prior to first dose of antibiotic  Outcome: Progressing Towards Goal  Goal: *First dose of  appropriate antibiotic within 3 hours of arrival to ED, within 1 hour of arrival to ICU  Outcome: Progressing Towards Goal  Goal: *Lactic acid with first set of blood cultures  Outcome: Progressing Towards Goal  Goal: *Pneumococcal immunization (if eligible)  Outcome: Progressing Towards Goal  Goal: *Influenza immunization (if eligible)  Outcome: Progressing Towards Goal  Goal: Activity/Safety  Outcome: Progressing Towards Goal  Goal: Consults, if ordered  Outcome: Progressing Towards Goal  Goal: Diagnostic Test/Procedures  Outcome: Progressing Towards Goal  Goal: Nutrition/Diet  Outcome: Progressing Towards Goal  Goal: Discharge Planning  Outcome: Progressing Towards Goal  Goal: Medications  Outcome: Progressing Towards Goal  Goal: Respiratory  Outcome: Progressing Towards Goal  Goal: Treatments/Interventions/Procedures  Outcome: Progressing Towards Goal  Goal: Psychosocial  Outcome: Progressing Towards Goal     Problem: Sepsis: Day 2  Goal: Off Pathway (Use only if patient is Off Pathway)  Outcome: Progressing Towards Goal  Goal: *Central Venous Pressure maintained at 8-12 mm Hg  Outcome: Progressing Towards Goal  Goal: *Hemodynamically stable  Outcome: Progressing Towards Goal  Goal: *Tolerating diet  Outcome: Progressing Towards Goal  Goal: Activity/Safety  Outcome: Progressing Towards Goal  Goal: Consults, if ordered  Outcome: Progressing Towards Goal  Goal: Diagnostic Test/Procedures  Outcome: Progressing Towards Goal  Goal: Nutrition/Diet  Outcome: Progressing Towards Goal  Goal: Discharge Planning  Outcome: Progressing Towards Goal  Goal: Medications  Outcome: Progressing Towards Goal  Goal: Respiratory  Outcome: Progressing Towards Goal  Goal: Treatments/Interventions/Procedures  Outcome: Progressing Towards Goal  Goal: Psychosocial  Outcome: Progressing Towards Goal     Problem: Sepsis: Day 3  Goal: Off Pathway (Use only if patient is Off Pathway)  Outcome: Progressing Towards Goal  Goal: *Central Venous Pressure maintained at 8-12 mm Hg  Outcome: Progressing Towards Goal  Goal: *Oxygen saturation within defined limits  Outcome: Progressing Towards Goal  Goal: *Vital sign stability  Outcome: Progressing Towards Goal  Goal: *Tolerating diet  Outcome: Progressing Towards Goal  Goal: *Demonstrates progressive activity  Outcome: Progressing Towards Goal  Goal: Activity/Safety  Outcome: Progressing Towards Goal  Goal: Consults, if ordered  Outcome: Progressing Towards Goal  Goal: Diagnostic Test/Procedures  Outcome: Progressing Towards Goal  Goal: Nutrition/Diet  Outcome: Progressing Towards Goal  Goal: Discharge Planning  Outcome: Progressing Towards Goal  Goal: Medications  Outcome: Progressing Towards Goal  Goal: Respiratory  Outcome: Progressing Towards Goal  Goal: Treatments/Interventions/Procedures  Outcome: Progressing Towards Goal  Goal: Psychosocial  Outcome: Progressing Towards Goal     Problem: Sepsis: Day 4  Goal: Off Pathway (Use only if patient is Off Pathway)  Outcome: Progressing Towards Goal  Goal: Activity/Safety  Outcome: Progressing Towards Goal  Goal: Consults, if ordered  Outcome: Progressing Towards Goal  Goal: Diagnostic Test/Procedures  Outcome: Progressing Towards Goal  Goal: Nutrition/Diet  Outcome: Progressing Towards Goal  Goal: Discharge Planning  Outcome: Progressing Towards Goal  Goal: Medications  Outcome: Progressing Towards Goal  Goal: Respiratory  Outcome: Progressing Towards Goal  Goal: Treatments/Interventions/Procedures  Outcome: Progressing Towards Goal  Goal: Psychosocial  Outcome: Progressing Towards Goal  Goal: *Oxygen saturation within defined limits  Outcome: Progressing Towards Goal  Goal: *Hemodynamically stable  Outcome: Progressing Towards Goal  Goal: *Vital signs within defined limits  Outcome: Progressing Towards Goal  Goal: *Tolerating diet  Outcome: Progressing Towards Goal  Goal: *Demonstrates progressive activity  Outcome: Progressing Towards Goal  Goal: *Fluid volume maintenance  Outcome: Progressing Towards Goal     Problem: Sepsis: Day 5  Goal: Off Pathway (Use only if patient is Off Pathway)  Outcome: Progressing Towards Goal  Goal: *Oxygen saturation within defined limits  Outcome: Progressing Towards Goal  Goal: *Vital signs within defined limits  Outcome: Progressing Towards Goal  Goal: *Tolerating diet  Outcome: Progressing Towards Goal  Goal: *Demonstrates progressive activity  Outcome: Progressing Towards Goal  Goal: *Discharge plan identified  Outcome: Progressing Towards Goal  Goal: Activity/Safety  Outcome: Progressing Towards Goal  Goal: Consults, if ordered  Outcome: Progressing Towards Goal  Goal: Diagnostic Test/Procedures  Outcome: Progressing Towards Goal  Goal: Nutrition/Diet  Outcome: Progressing Towards Goal  Goal: Discharge Planning  Outcome: Progressing Towards Goal  Goal: Medications  Outcome: Progressing Towards Goal  Goal: Respiratory  Outcome: Progressing Towards Goal  Goal: Treatments/Interventions/Procedures  Outcome: Progressing Towards Goal  Goal: Psychosocial  Outcome: Progressing Towards Goal     Problem: Sepsis: Day 6  Goal: Off Pathway (Use only if patient is Off Pathway)  Outcome: Progressing Towards Goal  Goal: *Oxygen saturation within defined limits  Outcome: Progressing Towards Goal  Goal: *Vital signs within defined limits  Outcome: Progressing Towards Goal  Goal: *Tolerating diet  Outcome: Progressing Towards Goal  Goal: *Demonstrates progressive activity  Outcome: Progressing Towards Goal  Goal: Influenza immunization  Outcome: Progressing Towards Goal  Goal: *Pneumococcal immunization  Outcome: Progressing Towards Goal  Goal: Activity/Safety  Outcome: Progressing Towards Goal  Goal: Diagnostic Test/Procedures  Outcome: Progressing Towards Goal  Goal: Nutrition/Diet  Outcome: Progressing Towards Goal  Goal: Discharge Planning  Outcome: Progressing Towards Goal  Goal: Medications  Outcome: Progressing Towards Goal  Goal: Respiratory  Outcome: Progressing Towards Goal  Goal: Treatments/Interventions/Procedures  Outcome: Progressing Towards Goal  Goal: Psychosocial  Outcome: Progressing Towards Goal     Problem: Sepsis: Discharge Outcomes  Goal: *Vital signs within defined limits  Outcome: Progressing Towards Goal  Goal: *Tolerating diet  Outcome: Progressing Towards Goal  Goal: *Verbalizes understanding and describes prescribed diet  Outcome: Progressing Towards Goal  Goal: *Demonstrates progressive activity  Outcome: Progressing Towards Goal  Goal: *Describes follow-up/return visits to physicians  Outcome: Progressing Towards Goal  Goal: *Verbalizes name, dosage, time, side effects, and number of days to continue medications  Outcome: Progressing Towards Goal  Goal: *Influenza immunization (Oct-Mar only)  Outcome: Progressing Towards Goal  Goal: *Pneumococcal immunization  Outcome: Progressing Towards Goal  Goal: *Lungs clear or at baseline  Outcome: Progressing Towards Goal  Goal: *Oxygen saturation returns to baseline or 90% or better on room air  Outcome: Progressing Towards Goal  Goal: *Glycemic control  Outcome: Progressing Towards Goal  Goal: *Absence of deep venous thrombosis signs and symptoms(Stroke Metric)  Outcome: Progressing Towards Goal  Goal: *Describes available resources and support systems  Outcome: Progressing Towards Goal  Goal: *Optimal pain control at patient's stated goal  Outcome: Progressing Towards Goal

## 2021-12-10 LAB
ANION GAP SERPL CALC-SCNC: 6 MMOL/L (ref 5–15)
BUN SERPL-MCNC: 58 MG/DL (ref 6–20)
BUN/CREAT SERPL: 18 (ref 12–20)
CA-I BLD-MCNC: 8.6 MG/DL (ref 8.5–10.1)
CHLORIDE SERPL-SCNC: 107 MMOL/L (ref 97–108)
CO2 SERPL-SCNC: 23 MMOL/L (ref 21–32)
CREAT SERPL-MCNC: 3.21 MG/DL (ref 0.7–1.3)
ERYTHROCYTE [DISTWIDTH] IN BLOOD BY AUTOMATED COUNT: 13.2 % (ref 11.5–14.5)
GLUCOSE BLD STRIP.AUTO-MCNC: 119 MG/DL (ref 65–117)
GLUCOSE BLD STRIP.AUTO-MCNC: 124 MG/DL (ref 65–117)
GLUCOSE BLD STRIP.AUTO-MCNC: 129 MG/DL (ref 65–117)
GLUCOSE BLD STRIP.AUTO-MCNC: 151 MG/DL (ref 65–117)
GLUCOSE SERPL-MCNC: 128 MG/DL (ref 65–100)
HCT VFR BLD AUTO: 39.6 % (ref 36.6–50.3)
HGB BLD-MCNC: 13.2 G/DL (ref 12.1–17)
MAGNESIUM SERPL-MCNC: 2.7 MG/DL (ref 1.6–2.4)
MCH RBC QN AUTO: 29.7 PG (ref 26–34)
MCHC RBC AUTO-ENTMCNC: 33.3 G/DL (ref 30–36.5)
MCV RBC AUTO: 89 FL (ref 80–99)
NRBC # BLD: 0 K/UL (ref 0–0.01)
NRBC BLD-RTO: 0 PER 100 WBC
PERFORMED BY, TECHID: ABNORMAL
PLATELET # BLD AUTO: 175 K/UL (ref 150–400)
PMV BLD AUTO: 11.7 FL (ref 8.9–12.9)
POTASSIUM SERPL-SCNC: 4.3 MMOL/L (ref 3.5–5.1)
RBC # BLD AUTO: 4.45 M/UL (ref 4.1–5.7)
SODIUM SERPL-SCNC: 136 MMOL/L (ref 136–145)
WBC # BLD AUTO: 12.9 K/UL (ref 4.1–11.1)

## 2021-12-10 PROCEDURE — 74011250637 HC RX REV CODE- 250/637: Performed by: INTERNAL MEDICINE

## 2021-12-10 PROCEDURE — 80048 BASIC METABOLIC PNL TOTAL CA: CPT

## 2021-12-10 PROCEDURE — 92610 EVALUATE SWALLOWING FUNCTION: CPT

## 2021-12-10 PROCEDURE — 83735 ASSAY OF MAGNESIUM: CPT

## 2021-12-10 PROCEDURE — 74011250636 HC RX REV CODE- 250/636: Performed by: INTERNAL MEDICINE

## 2021-12-10 PROCEDURE — 85027 COMPLETE CBC AUTOMATED: CPT

## 2021-12-10 PROCEDURE — 36415 COLL VENOUS BLD VENIPUNCTURE: CPT

## 2021-12-10 PROCEDURE — 99232 SBSQ HOSP IP/OBS MODERATE 35: CPT | Performed by: UROLOGY

## 2021-12-10 PROCEDURE — 65270000029 HC RM PRIVATE

## 2021-12-10 PROCEDURE — 74011250637 HC RX REV CODE- 250/637: Performed by: HOSPITALIST

## 2021-12-10 PROCEDURE — 74011000258 HC RX REV CODE- 258: Performed by: INTERNAL MEDICINE

## 2021-12-10 PROCEDURE — 74011636637 HC RX REV CODE- 636/637: Performed by: INTERNAL MEDICINE

## 2021-12-10 PROCEDURE — 82962 GLUCOSE BLOOD TEST: CPT

## 2021-12-10 RX ORDER — SODIUM CHLORIDE 9 MG/ML
50 INJECTION, SOLUTION INTRAVENOUS CONTINUOUS
Status: DISCONTINUED | OUTPATIENT
Start: 2021-12-10 | End: 2021-12-17

## 2021-12-10 RX ORDER — NYSTATIN 100000 [USP'U]/ML
500000 SUSPENSION ORAL 4 TIMES DAILY
Status: DISCONTINUED | OUTPATIENT
Start: 2021-12-10 | End: 2021-12-20

## 2021-12-10 RX ADMIN — Medication 10 ML: at 00:11

## 2021-12-10 RX ADMIN — NYSTATIN 500000 UNITS: 100000 SUSPENSION ORAL at 22:26

## 2021-12-10 RX ADMIN — INSULIN LISPRO 2 UNITS: 100 INJECTION, SOLUTION INTRAVENOUS; SUBCUTANEOUS at 17:41

## 2021-12-10 RX ADMIN — HEPARIN SODIUM 5000 UNITS: 5000 INJECTION INTRAVENOUS; SUBCUTANEOUS at 12:00

## 2021-12-10 RX ADMIN — HYDROMORPHONE HYDROCHLORIDE 0.5 MG: 1 INJECTION, SOLUTION INTRAMUSCULAR; INTRAVENOUS; SUBCUTANEOUS at 17:08

## 2021-12-10 RX ADMIN — Medication 10 ML: at 13:26

## 2021-12-10 RX ADMIN — SODIUM CHLORIDE 50 ML/HR: 9 INJECTION, SOLUTION INTRAVENOUS at 17:09

## 2021-12-10 RX ADMIN — HEPARIN SODIUM 5000 UNITS: 5000 INJECTION INTRAVENOUS; SUBCUTANEOUS at 05:00

## 2021-12-10 RX ADMIN — ASPIRIN 81 MG: 81 TABLET, COATED ORAL at 09:10

## 2021-12-10 RX ADMIN — NYSTATIN 500000 UNITS: 100000 SUSPENSION ORAL at 17:08

## 2021-12-10 RX ADMIN — Medication 10 ML: at 00:10

## 2021-12-10 RX ADMIN — PANTOPRAZOLE SODIUM 40 MG: 40 TABLET, DELAYED RELEASE ORAL at 09:10

## 2021-12-10 RX ADMIN — NYSTATIN 500000 UNITS: 100000 SUSPENSION ORAL at 12:01

## 2021-12-10 RX ADMIN — Medication 10 ML: at 06:25

## 2021-12-10 RX ADMIN — Medication 10 ML: at 06:24

## 2021-12-10 RX ADMIN — HEPARIN SODIUM 5000 UNITS: 5000 INJECTION INTRAVENOUS; SUBCUTANEOUS at 22:28

## 2021-12-10 RX ADMIN — Medication 10 ML: at 22:26

## 2021-12-10 RX ADMIN — TAMSULOSIN HYDROCHLORIDE 0.4 MG: 0.4 CAPSULE ORAL at 09:10

## 2021-12-10 RX ADMIN — CEFTRIAXONE SODIUM 2 G: 2 INJECTION, POWDER, FOR SOLUTION INTRAMUSCULAR; INTRAVENOUS at 09:10

## 2021-12-10 RX ADMIN — ATORVASTATIN CALCIUM 80 MG: 40 TABLET, FILM COATED ORAL at 09:10

## 2021-12-10 RX ADMIN — HYDROMORPHONE HYDROCHLORIDE 0.5 MG: 1 INJECTION, SOLUTION INTRAMUSCULAR; INTRAVENOUS; SUBCUTANEOUS at 09:14

## 2021-12-10 NOTE — PROGRESS NOTES
Chart reviewed, DCP remains for patient to d/c to home self care, will not qualify for [facility] as he works full time. CM continues to follow and monitor for needs.

## 2021-12-10 NOTE — PROGRESS NOTES
Cardiology Progress Note      12/10/2021 10:38 AM    Admit Date: 12/6/2021    Admit Diagnosis: Pyelonephritis [N12]      Subjective:   Patient seen and examined. He remains chest pain-free. No overnight events.     Visit Vitals  /79 (BP 1 Location: Right upper arm, BP Patient Position: At rest)   Pulse 71   Temp 99 °F (37.2 °C)   Resp 18   Ht 6' 9\" (2.057 m)   Wt 107 kg (236 lb)   SpO2 97%   BMI 25.29 kg/m²     Current Facility-Administered Medications   Medication Dose Route Frequency    nystatin (MYCOSTATIN) 100,000 unit/mL oral suspension 500,000 Units  500,000 Units Oral QID    0.9% sodium chloride infusion  50 mL/hr IntraVENous CONTINUOUS    heparin (porcine) injection 5,000 Units  5,000 Units SubCUTAneous Q8H    pantoprazole (PROTONIX) tablet 40 mg  40 mg Oral ACB    tamsulosin (FLOMAX) capsule 0.4 mg  0.4 mg Oral DAILY    atorvastatin (LIPITOR) tablet 80 mg  80 mg Oral DAILY    sodium chloride (NS) flush 5-40 mL  5-40 mL IntraVENous Q8H    sodium chloride (NS) flush 5-40 mL  5-40 mL IntraVENous PRN    acetaminophen (TYLENOL) tablet 650 mg  650 mg Oral Q6H PRN    Or    acetaminophen (TYLENOL) suppository 650 mg  650 mg Rectal Q6H PRN    polyethylene glycol (MIRALAX) packet 17 g  17 g Oral DAILY PRN    ondansetron (ZOFRAN ODT) tablet 4 mg  4 mg Oral Q8H PRN    Or    ondansetron (ZOFRAN) injection 4 mg  4 mg IntraVENous Q6H PRN    HYDROmorphone (DILAUDID) syringe 0.5 mg  0.5 mg IntraVENous Q4H PRN    sodium chloride (NS) flush 5-40 mL  5-40 mL IntraVENous Q8H    cefTRIAXone (ROCEPHIN) 2 g in 0.9% sodium chloride (MBP/ADV) 50 mL MBP  2 g IntraVENous ACB    glucose chewable tablet 16 g  4 Tablet Oral PRN    dextrose (D50W) injection syrg 12.5-25 g  25-50 mL IntraVENous PRN    glucagon (GLUCAGEN) injection 1 mg  1 mg IntraMUSCular PRN    insulin lispro (HUMALOG) injection   SubCUTAneous AC&HS    aspirin delayed-release tablet 81 mg  81 mg Oral DAILY         Objective:      Physical Exam:  Visit Vitals  /79 (BP 1 Location: Right upper arm, BP Patient Position: At rest)   Pulse 71   Temp 99 °F (37.2 °C)   Resp 18   Ht 6' 9\" (2.057 m)   Wt 107 kg (236 lb)   SpO2 97%   BMI 25.29 kg/m²     General Appearance:  Well developed, well nourished,alert and oriented x 3, and individual in no acute distress. Ears/Nose/Mouth/Throat:   Hearing grossly normal.         Neck: Supple. Chest:   Lungs clear to auscultation bilaterally. Cardiovascular:  Regular rate and rhythm, S1, S2 normal, no murmur. Abdomen:   Soft, non-tender, bowel sounds are active. Extremities: No edema bilaterally. Skin: Warm and dry.                Data Review:   Labs:    Recent Results (from the past 24 hour(s))   GLUCOSE, POC    Collection Time: 12/09/21  8:48 PM   Result Value Ref Range    Glucose (POC) 109 65 - 117 mg/dL    Performed by [address] [place] [address], POC    Collection Time: 12/10/21  7:55 AM   Result Value Ref Range    Glucose (POC) 119 (H) 65 - 117 mg/dL    Performed by Boone Brown    CBC W/O DIFF    Collection Time: 12/10/21  8:23 AM   Result Value Ref Range    WBC 12.9 (H) 4.1 - 11.1 K/uL    RBC 4.45 4.10 - 5.70 M/uL    HGB 13.2 12.1 - 17.0 g/dL    HCT 39.6 36.6 - 50.3 %    MCV 89.0 80.0 - 99.0 FL    MCH 29.7 26.0 - 34.0 PG    MCHC 33.3 30.0 - 36.5 g/dL    RDW 13.2 11.5 - 14.5 %    PLATELET 020 278 - 388 K/uL    MPV 11.7 8.9 - 12.9 FL    NRBC 0.0 0.0  WBC    ABSOLUTE NRBC 0.00 0.00 - 0.79 K/uL   METABOLIC PANEL, BASIC    Collection Time: 12/10/21  8:23 AM   Result Value Ref Range    Sodium 136 136 - 145 mmol/L    Potassium 4.3 3.5 - 5.1 mmol/L    Chloride 107 97 - 108 mmol/L    CO2 23 21 - 32 mmol/L    Anion gap 6 5 - 15 mmol/L    Glucose 128 (H) 65 - 100 mg/dL    BUN 58 (H) 6 - 20 mg/dL    Creatinine 3.21 (H) 0.70 - 1.30 mg/dL    BUN/Creatinine ratio 18 12 - 20      GFR est AA 24 (L) >60 ml/min/1.73m2    GFR est non-AA 20 (L) >60 ml/min/1.73m2    Calcium 8.6 8.5 - 10.1 mg/dL   MAGNESIUM Collection Time: 12/10/21  8:23 AM   Result Value Ref Range    Magnesium 2.7 (H) 1.6 - 2.4 mg/dL   GLUCOSE, POC    Collection Time: 12/10/21 11:23 AM   Result Value Ref Range    Glucose (POC) 129 (H) 65 - 117 mg/dL    Performed by Rowena Arana        Telemetry: normal sinus rhythm      Assessment:   NSTEMI type II  Pyelonephritis status post ureteral stent  Acute kidney injury  Diabetes mellitus  Hypertension  Hyperlipidemia    Plan:   40-year-old male with a past medical history as above who is seen for elevated troponins after undergoing ureteral stent placement.  -Patient has had no clear anginal symptoms but he has significantly elevated troponins which are now downtrending. I reviewed his transthoracic echocardiogram which shows mild LV dysfunction with lateral, anterolateral, inferolateral wall motion abnormality consistent with coronary artery disease in the left circumflex/right coronary artery territory  -Slight improvement in kidney function  -Continue heparin infusion for 48 hours  -Continue aspirin, high intensity statin. Add beta-blockers as hemodynamically able. Resting heart rate is in the 50s hence I am reluctant to start this yet  -Once his presenting problems have stabilized and renal status has been optimized, he will need cardiac catheterization for further evaluation.  Anticipate this for Monday or when kidney function normalizes  -We will continue to follow the patient and give further recommendations pending his clinical course.     Thank you for allowing us to participate in the care of your patient

## 2021-12-10 NOTE — PROGRESS NOTES
Problem: Dysphagia (Adult)  Goal: *Acute Goals and Plan of Care (Insert Text)  Description: Speech Therapy Goals  Initiated 12/10/2021  -Patient will tolerate easy to chew diet with thin liquids without signs/symptoms of aspiration given no cues within 7 day(s). [ ] Not met  [ ]  MET   [ ] Progressing  [ ] Safia Purple  -Patient will demonstrate understanding of swallow safety precautions and aspiration precautions, diet recs with no cues within 7 day(s). [ ] Not met  [ ]  MET   [ ] Progressing  [ ] Discontinue    Outcome: Not Met   SPEECH [address] EVALUATION  Patient: Lianna Lopez (82 y.o. male)  Date: 12/10/2021  Primary Diagnosis: Pyelonephritis [N12]  Procedure(s) (LRB):  Cystoscopy, left retrograde pyelogram   ureteral stent placement, direct vision internal urethrotomy (Bilateral) 4 Days Post-Op   Precautions: aspiration       ASSESSMENT :  Based on the objective data described below, the patient presents with oropharyngeal sw function WNL, with coughing present. Pt admitted with pyelonephritis, sepsis. Hx includes DM, HTN, hypothryoidism. Pt is pending cardiac cath when medically appropriate. Nsg reports coughing with PO intake. Pt is noted to have white coating on tongue and reports pain in throat and jaw when chewing, MD and nsg made aware and MD reports will address. Pt reports coughing with [place] this date. Oral motor function WNL, dentition intact. Vocal quality WNL. With trials of thin via cup/straw, hard solids and puree, oropharyngeal sw function appears WFL. With thin via straw x 1, pt with significant dry coughing response. Pt is able to recover and no further coughing observed with further trials. Pt denies GERD. Patient will benefit from skilled intervention to address the above impairments. Patients rehabilitation potential is considered to be Good     PLAN :  Recommendations and Planned Interventions:   At this time, will recommend diet downgrade to easy to chew/thin with aspiration and GERD precautions, meds one at a time (pt is able to tolerate per nsg and pt.). Recommend MD to address possible oral thrush and pain with swallow. If s/s aspiration persist, will request MBS to further assess sw function and safety due to aspiration concerns. However, pt's coughing does not appear to be overtly due to aspiration. Most recent CXR without acute infiltrate per report. Cont SLP tx for dysphagia. Frequency/Duration: Patient will be followed by speech-language pathology 5 times a week to address goals. Discharge Recommendations: cont SLP Tx at this [place]. SUBJECTIVE:   Patient alert, agreeable, pleasant. Pt's brother in law Cathy Belle present with pt's consent. OBJECTIVE:     CXR Results  (Last 48 hours)                 12/09/21 1125  XR CHEST PORT Final result    Impression:  No acute findings. Narrative:  Portable upright radiograph chest 11:12 a.m. compared with December 6, 2021. INDICATION: Dyspnea. Heart size is stable. Patient has taken a better depth of inspiration. There is   minimal atelectasis at the left lung base. No significant infiltrate. No   pneumothorax. No acute bony findings.                   Past Medical History:   Diagnosis Date    Diabetes ([address]     Gout     Hypertension     Hypothyroidism     Prostate cancer ([address]     Renal stones      Past Surgical History:   Procedure Laterality Date    HX PROSTATE SURGERY      HX UROLOGICAL  12/06/2021    Cystoscopy,    HX UROLOGICAL  12/06/2021     left retrograde pyelogram      HX UROLOGICAL  12/06/2021    ureteral stent placement    HX UROLOGICAL  12/06/2021    direct vision internal urethrotomy     Prior Level of Function/Home Situation:   Home Situation  Home Environment: Private residence  One/Two Story Residence: Two story  Living Alone: Yes  Support Systems: Parent(s)  Patient Expects to be Discharged to[de-identified] House  Current DME Used/Available at Home: None  Diet prior to admission: regular/thin  Current Diet:  Regular/thin, DM   Cognitive and Communication Status:  Neurologic State: Alert  Orientation Level: Oriented X4    Pain:  Pain Scale 1: Numeric (0 - 10)  Pain Intensity 1: 7  Pain Location 1: Abdomen    After treatment:   Patient left in no apparent distress in bed, Call bell within reach, Nursing notified, and Caregiver / family present    COMMUNICATION/EDUCATION:   Patient was educated regarding purpose of SLP assessment, POC, diet recs and sw safety precautions. Patient demonstrated Good understanding as evidenced by verbal responsiveness, teach back. The patient's plan of care including recommendations, planned interventions, and recommended diet changes were discussed with: Registered nurse and Physician. Patient/family have participated as able in goal setting and plan of care. Patient/family agree to work toward stated goals and plan of care.     Thank you for this referral.  Kallie Olivas M.S. CCC-SLP  Time Calculation: 16 mins

## 2021-12-10 NOTE — PROGRESS NOTES
Problem: Falls - Risk of  Goal: *Absence of Falls  Description: Document Clydene Bone Fall Risk and appropriate interventions in the flowsheet.   Outcome: Progressing Towards Goal  Note: Fall Risk Interventions:            Medication Interventions: Patient to call before getting OOB    Elimination Interventions: Bed/chair exit alarm, Call light in reach, Urinal in reach              Problem: Patient Education: Go to Patient Education Activity  Goal: Patient/Family Education  Outcome: Progressing Towards Goal     Problem: Patient Education: Go to Patient Education Activity  Goal: Patient/Family Education  Outcome: Progressing Towards Goal     Problem: Sepsis: Day of Diagnosis  Goal: Off Pathway (Use only if patient is Off Pathway)  Outcome: Progressing Towards Goal  Goal: *Fluid resuscitation  Outcome: Progressing Towards Goal  Goal: *Paired blood cultures prior to first dose of antibiotic  Outcome: Progressing Towards Goal  Goal: *First dose of  appropriate antibiotic within 3 hours of arrival to ED, within 1 hour of arrival to ICU  Outcome: Progressing Towards Goal  Goal: *Lactic acid with first set of blood cultures  Outcome: Progressing Towards Goal  Goal: *Pneumococcal immunization (if eligible)  Outcome: Progressing Towards Goal  Goal: *Influenza immunization (if eligible)  Outcome: Progressing Towards Goal  Goal: Activity/Safety  Outcome: Progressing Towards Goal  Goal: Consults, if ordered  Outcome: Progressing Towards Goal  Goal: Diagnostic Test/Procedures  Outcome: Progressing Towards Goal  Goal: Nutrition/Diet  Outcome: Progressing Towards Goal  Goal: Discharge Planning  Outcome: Progressing Towards Goal  Goal: Medications  Outcome: Progressing Towards Goal  Goal: Respiratory  Outcome: Progressing Towards Goal  Goal: Treatments/Interventions/Procedures  Outcome: Progressing Towards Goal  Goal: Psychosocial  Outcome: Progressing Towards Goal     Problem: Sepsis: Day 2  Goal: Off Pathway (Use only if patient is Off Pathway)  Outcome: Progressing Towards Goal  Goal: *Central Venous Pressure maintained at 8-12 mm Hg  Outcome: Progressing Towards Goal  Goal: *Hemodynamically stable  Outcome: Progressing Towards Goal  Goal: *Tolerating diet  Outcome: Progressing Towards Goal  Goal: Activity/Safety  Outcome: Progressing Towards Goal  Goal: Consults, if ordered  Outcome: Progressing Towards Goal  Goal: Diagnostic Test/Procedures  Outcome: Progressing Towards Goal  Goal: Nutrition/Diet  Outcome: Progressing Towards Goal  Goal: Discharge Planning  Outcome: Progressing Towards Goal  Goal: Medications  Outcome: Progressing Towards Goal  Goal: Respiratory  Outcome: Progressing Towards Goal  Goal: Treatments/Interventions/Procedures  Outcome: Progressing Towards Goal  Goal: Psychosocial  Outcome: Progressing Towards Goal     Problem: Sepsis: Day 3  Goal: Off Pathway (Use only if patient is Off Pathway)  Outcome: Progressing Towards Goal  Goal: *Central Venous Pressure maintained at 8-12 mm Hg  Outcome: Progressing Towards Goal  Goal: *Oxygen saturation within defined limits  Outcome: Progressing Towards Goal  Goal: *Vital sign stability  Outcome: Progressing Towards Goal  Goal: *Tolerating diet  Outcome: Progressing Towards Goal  Goal: *Demonstrates progressive activity  Outcome: Progressing Towards Goal  Goal: Activity/Safety  Outcome: Progressing Towards Goal  Goal: Consults, if ordered  Outcome: Progressing Towards Goal  Goal: Diagnostic Test/Procedures  Outcome: Progressing Towards Goal  Goal: Nutrition/Diet  Outcome: Progressing Towards Goal  Goal: Discharge Planning  Outcome: Progressing Towards Goal  Goal: Medications  Outcome: Progressing Towards Goal  Goal: Respiratory  Outcome: Progressing Towards Goal  Goal: Treatments/Interventions/Procedures  Outcome: Progressing Towards Goal  Goal: Psychosocial  Outcome: Progressing Towards Goal     Problem: Sepsis: Day 4  Goal: Off Pathway (Use only if patient is Off Pathway)  Outcome: Progressing Towards Goal  Goal: Activity/Safety  Outcome: Progressing Towards Goal  Goal: Consults, if ordered  Outcome: Progressing Towards Goal  Goal: Diagnostic Test/Procedures  Outcome: Progressing Towards Goal  Goal: Nutrition/Diet  Outcome: Progressing Towards Goal  Goal: Discharge Planning  Outcome: Progressing Towards Goal  Goal: Medications  Outcome: Progressing Towards Goal  Goal: Respiratory  Outcome: Progressing Towards Goal  Goal: Treatments/Interventions/Procedures  Outcome: Progressing Towards Goal  Goal: Psychosocial  Outcome: Progressing Towards Goal  Goal: *Oxygen saturation within defined limits  Outcome: Progressing Towards Goal  Goal: *Hemodynamically stable  Outcome: Progressing Towards Goal  Goal: *Vital signs within defined limits  Outcome: Progressing Towards Goal  Goal: *Tolerating diet  Outcome: Progressing Towards Goal  Goal: *Demonstrates progressive activity  Outcome: Progressing Towards Goal  Goal: *Fluid volume maintenance  Outcome: Progressing Towards Goal     Problem: Sepsis: Day 5  Goal: Off Pathway (Use only if patient is Off Pathway)  Outcome: Progressing Towards Goal  Goal: *Oxygen saturation within defined limits  Outcome: Progressing Towards Goal  Goal: *Vital signs within defined limits  Outcome: Progressing Towards Goal  Goal: *Tolerating diet  Outcome: Progressing Towards Goal  Goal: *Demonstrates progressive activity  Outcome: Progressing Towards Goal  Goal: *Discharge plan identified  Outcome: Progressing Towards Goal  Goal: Activity/Safety  Outcome: Progressing Towards Goal  Goal: Consults, if ordered  Outcome: Progressing Towards Goal  Goal: Diagnostic Test/Procedures  Outcome: Progressing Towards Goal  Goal: Nutrition/Diet  Outcome: Progressing Towards Goal  Goal: Discharge Planning  Outcome: Progressing Towards Goal  Goal: Medications  Outcome: Progressing Towards Goal  Goal: Respiratory  Outcome: Progressing Towards Goal  Goal: Treatments/Interventions/Procedures  Outcome: Progressing Towards Goal  Goal: Psychosocial  Outcome: Progressing Towards Goal     Problem: Sepsis: Day 6  Goal: Off Pathway (Use only if patient is Off Pathway)  Outcome: Progressing Towards Goal  Goal: *Oxygen saturation within defined limits  Outcome: Progressing Towards Goal  Goal: *Vital signs within defined limits  Outcome: Progressing Towards Goal  Goal: *Tolerating diet  Outcome: Progressing Towards Goal  Goal: *Demonstrates progressive activity  Outcome: Progressing Towards Goal  Goal: Influenza immunization  Outcome: Progressing Towards Goal  Goal: *Pneumococcal immunization  Outcome: Progressing Towards Goal  Goal: Activity/Safety  Outcome: Progressing Towards Goal  Goal: Diagnostic Test/Procedures  Outcome: Progressing Towards Goal  Goal: Nutrition/Diet  Outcome: Progressing Towards Goal  Goal: Discharge Planning  Outcome: Progressing Towards Goal  Goal: Medications  Outcome: Progressing Towards Goal  Goal: Respiratory  Outcome: Progressing Towards Goal  Goal: Treatments/Interventions/Procedures  Outcome: Progressing Towards Goal  Goal: Psychosocial  Outcome: Progressing Towards Goal     Problem: Sepsis: Discharge Outcomes  Goal: *Vital signs within defined limits  Outcome: Progressing Towards Goal  Goal: *Tolerating diet  Outcome: Progressing Towards Goal  Goal: *Verbalizes understanding and describes prescribed diet  Outcome: Progressing Towards Goal  Goal: *Demonstrates progressive activity  Outcome: Progressing Towards Goal  Goal: *Describes follow-up/return visits to physicians  Outcome: Progressing Towards Goal  Goal: *Verbalizes name, dosage, time, side effects, and number of days to continue medications  Outcome: Progressing Towards Goal  Goal: *Influenza immunization (Oct-Mar only)  Outcome: Progressing Towards Goal  Goal: *Pneumococcal immunization  Outcome: Progressing Towards Goal  Goal: *Lungs clear or at baseline  Outcome: Progressing Towards Goal  Goal: *Oxygen saturation returns to baseline or 90% or better on room air  Outcome: Progressing Towards Goal  Goal: *Glycemic control  Outcome: Progressing Towards Goal  Goal: *Absence of deep venous thrombosis signs and symptoms(Stroke Metric)  Outcome: Progressing Towards Goal  Goal: *Describes available resources and support systems  Outcome: Progressing Towards Goal  Goal: *Optimal pain control at patient's stated goal  Outcome: Progressing Towards Goal

## 2021-12-10 NOTE — PROGRESS NOTES
Renal Daily Progress Note    Admit Date: 12/6/2021      Subjective:   He is feeling better. Pain is better. KUB reviewed. No chest pain or sob. Bowels moved. Current Facility-Administered Medications   Medication Dose Route Frequency    nystatin (MYCOSTATIN) 100,000 unit/mL oral suspension 500,000 Units  500,000 Units Oral QID    heparin (porcine) injection 5,000 Units  5,000 Units SubCUTAneous Q8H    pantoprazole (PROTONIX) tablet 40 mg  40 mg Oral ACB    tamsulosin (FLOMAX) capsule 0.4 mg  0.4 mg Oral DAILY    atorvastatin (LIPITOR) tablet 80 mg  80 mg Oral DAILY    sodium chloride (NS) flush 5-40 mL  5-40 mL IntraVENous Q8H    sodium chloride (NS) flush 5-40 mL  5-40 mL IntraVENous PRN    acetaminophen (TYLENOL) tablet 650 mg  650 mg Oral Q6H PRN    Or    acetaminophen (TYLENOL) suppository 650 mg  650 mg Rectal Q6H PRN    polyethylene glycol (MIRALAX) packet 17 g  17 g Oral DAILY PRN    ondansetron (ZOFRAN ODT) tablet 4 mg  4 mg Oral Q8H PRN    Or    ondansetron (ZOFRAN) injection 4 mg  4 mg IntraVENous Q6H PRN    HYDROmorphone (DILAUDID) syringe 0.5 mg  0.5 mg IntraVENous Q4H PRN    sodium chloride (NS) flush 5-40 mL  5-40 mL IntraVENous Q8H    cefTRIAXone (ROCEPHIN) 2 g in 0.9% sodium chloride (MBP/ADV) 50 mL MBP  2 g IntraVENous ACB    glucose chewable tablet 16 g  4 Tablet Oral PRN    dextrose (D50W) injection syrg 12.5-25 g  25-50 mL IntraVENous PRN    glucagon (GLUCAGEN) injection 1 mg  1 mg IntraMUSCular PRN    insulin lispro (HUMALOG) injection   SubCUTAneous AC&HS    aspirin delayed-release tablet 81 mg  81 mg Oral DAILY        Review of Systems    Review of Systems   Constitutional: Negative for fever. Respiratory: Negative for cough and shortness of breath. Cardiovascular: Negative for chest pain and palpitations. Gastrointestinal: Negative for abdominal pain and vomiting. Neurological: Negative for dizziness and headaches.           Objective:     Patient Vitals for the past 8 hrs:   BP Temp Pulse Resp SpO2   12/10/21 1145 -- 97.6 °F (36.4 °C) 70 18 94 %   12/10/21 0751 (!) 155/90 97.5 °F (36.4 °C) (!) 59 18 93 %   12/10/21 0525 137/84 97.9 °F (36.6 °C) 79 20 95 %     12/10 0701 - 12/10 1900  In: -   Out: 1800 [Urine:1800]  12/08 1901 - 12/10 0700  In: 1500 [I.V.:1500]  Out: 3950 [Urine:3950]    Physical Exam:   Physical Exam  Cardiovascular:      Rate and Rhythm: Regular rhythm. Pulmonary:      Breath sounds: Normal breath sounds. Abdominal:      Palpations: Abdomen is soft. Tenderness: There is abdominal tenderness. Skin:     General: Skin is warm. Neurological:      General: No focal deficit present. Mental Status: He is alert. Urine is clear. No rebound tenderness. XR ABD (KUB)   Final Result      XR CHEST PORT   Final Result   No acute findings. XR FLUOROSCOPY UNDER 60 MINUTES   Final Result      CT ABD PELV WO CONT   Final Result   1. 5 x 5 mm obstructing stone in the distal left ureter. Left perinephric   inflammation. Correlate to medical renal disease to include renal dysfunction   and pyelonephritis. 2. Nonobstructing bilateral nephrolithiasis. 3. Thickening versus nondistention of the proximal duodenum. Correlate for any   clinical symptoms. 4. Trace bilateral pleural effusions. 5. Hepatomegaly. 6. Other findings above. XR CHEST PORT   Final Result           Data Review   Recent Labs     12/10/21  0823 12/09/21  0550 12/08/21  1245   WBC 12.9* 13.3* 15.9*   HGB 13.2 12.3 12.3   HCT 39.6 36.8 37.1    141* 127*     Recent Labs     12/10/21  0823 12/09/21  0550 12/08/21  1245    136  --    K 4.3 3.8  --     107  --    CO2 23 21  --    * 108*  --    BUN 58* 64* 62*   CREA 3.21* 3.42* 3.47*   CA 8.6 7.8*  --    MG 2.7* 2.6*  --    PHOS  --  3.6  --      No components found for: GLPOC  No results for input(s): PH, PCO2, PO2, HCO3, FIO2 in the last 72 hours.   No results for input(s): INR, INREXT, INREXT, INREXT in the last 72 hours. Assessment:           Active Problems:    Pyelonephritis (12/6/2021)    Acute kidney injury secondary to left renal hydronephrosis caused by renal stone? Wonder if he has underlying CKD sec to hypertension. Renal function is better. Hyponatremia secondary to pain caused by increased ADH secretion. -Resolved    Thrombocytopenia improving    Left-sided pyelonephritis cultures negative so thus far. Hypothyroidism on replacement    Atrial fibrillation . S/p internal urethrotomy. NSTEMI    Plan:     Continue IV fluids  Follow renal function  Expect improvement in renal function.

## 2021-12-10 NOTE — PROGRESS NOTES
Hospitalist Progress Note               Daily Progress Note: 12/10/2021      Subjective: The patient is seen for follow up. He is a 78-year-old male with is of hypertension, diabetes and hypothyroidism who presented to the ED on 12/6 with left flank pain associated with fever and chills along with nausea and vomiting. CT showed a 5 mm obstructing stone in the distal left ureter with left perinephric inflammation. Patient did meet sepsis criteria. He was started on ceftriaxone. Patient was admitted to ICU. He underwent cystoscopy with left ureteral stenting on 12/6  -----  Patient seen for follow-up on 12/7. Feeling much better. Labs today show a creatinine of 2.99 up from 2.19 on admission    Troponin is 376, down from 873.   He was seen by cardiology who feels this represents a type II non-STEMI    Subjective-patient seen and evaluated at bedside, patient currently has no active/new complaints, discussed with RN at bedside    Problem List:  Problem List as of 12/10/2021 Never Reviewed          Codes Class Noted - Resolved    Pyelonephritis ICD-10-CM: N12  ICD-9-CM: 590.80  12/6/2021 - Present              Medications reviewed  Current Facility-Administered Medications   Medication Dose Route Frequency    nystatin (MYCOSTATIN) 100,000 unit/mL oral suspension 500,000 Units  500,000 Units Oral QID    heparin (porcine) injection 5,000 Units  5,000 Units SubCUTAneous Q8H    pantoprazole (PROTONIX) tablet 40 mg  40 mg Oral ACB    tamsulosin (FLOMAX) capsule 0.4 mg  0.4 mg Oral DAILY    atorvastatin (LIPITOR) tablet 80 mg  80 mg Oral DAILY    sodium chloride (NS) flush 5-40 mL  5-40 mL IntraVENous Q8H    sodium chloride (NS) flush 5-40 mL  5-40 mL IntraVENous PRN    acetaminophen (TYLENOL) tablet 650 mg  650 mg Oral Q6H PRN    Or    acetaminophen (TYLENOL) suppository 650 mg  650 mg Rectal Q6H PRN    polyethylene glycol (MIRALAX) packet 17 g  17 g Oral DAILY PRN    ondansetron (ZOFRAN ODT) tablet 4 mg  4 mg Oral Q8H PRN    Or    ondansetron (ZOFRAN) injection 4 mg  4 mg IntraVENous Q6H PRN    HYDROmorphone (DILAUDID) syringe 0.5 mg  0.5 mg IntraVENous Q4H PRN    sodium chloride (NS) flush 5-40 mL  5-40 mL IntraVENous Q8H    cefTRIAXone (ROCEPHIN) 2 g in 0.9% sodium chloride (MBP/ADV) 50 mL MBP  2 g IntraVENous ACB    glucose chewable tablet 16 g  4 Tablet Oral PRN    dextrose (D50W) injection syrg 12.5-25 g  25-50 mL IntraVENous PRN    glucagon (GLUCAGEN) injection 1 mg  1 mg IntraMUSCular PRN    insulin lispro (HUMALOG) injection   SubCUTAneous AC&HS    aspirin delayed-release tablet 81 mg  81 mg Oral DAILY       Review of Systems:   A comprehensive review of systems was negative except for that written in the HPI. Objective:   Physical Exam:     Visit Vitals  BP (!) 155/90 (BP 1 Location: Right upper arm, BP Patient Position: At rest;Semi fowlers)   Pulse (!) 59   Temp 97.5 °F (36.4 °C)   Resp 18   Ht 6' 9\" (2.057 m)   Wt 107 kg (236 lb)   SpO2 93%   BMI 25.29 kg/m²    O2 Flow Rate (L/min): 3 l/min O2 Device: None (Room air)    Temp (24hrs), Av.8 °F (36.6 °C), Min:97.5 °F (36.4 °C), Max:98 °F (36.7 °C)    12/10 0701 - 12/10 1900  In: -   Out: 1800 [Urine:1800]   1901 - 12/10 07  In: 1500 [I.V.:1500]  Out: 3950 [Urine:3950]    General:   Awake and alert   Lungs:   Clear to auscultation bilaterally. Chest wall:  No tenderness or deformity. Heart:  Regular rate and rhythm, S1, S2 normal, no murmur, click, rub or gallop. Abdomen:   Soft, non-tender. Bowel sounds normal. No masses,  No organomegaly. Extremities: Extremities normal, atraumatic, no cyanosis or edema. Pulses: 2+ and symmetric all extremities. Skin: Skin color, texture, turgor normal. No rashes or lesions   Neurologic: CNII-XII intact.   No gross focal deficits         Data Review:       Recent Days:  Recent Labs     12/10/21  0823 21  0550 21  1245   WBC 12.9* 13.3* 15.9*   HGB 13.2 12.3 12.3 HCT 39.6 36.8 37.1    141* 127*     Recent Labs     12/10/21  0823 12/09/21  0550 12/08/21  1245    136  --    K 4.3 3.8  --     107  --    CO2 23 21  --    * 108*  --    BUN 58* 64* 62*   CREA 3.21* 3.42* 3.47*   CA 8.6 7.8*  --    MG 2.7* 2.6*  --    PHOS  --  3.6  --      No results for input(s): PH, PCO2, PO2, HCO3, FIO2 in the last 72 hours.     24 Hour Results:  Recent Results (from the past 24 hour(s))   GLUCOSE, POC    Collection Time: 12/09/21 11:24 AM   Result Value Ref Range    Glucose (POC) 121 (H) 65 - 117 mg/dL    Performed by Shiva Townsend    PTT    Collection Time: 12/09/21  1:35 PM   Result Value Ref Range    aPTT 28.6 21.2 - 34.1 sec    aPTT, therapeutic range   82 - 109 sec   GLUCOSE, POC    Collection Time: 12/09/21  3:46 PM   Result Value Ref Range    Glucose (POC) 120 (H) 65 - 117 mg/dL    Performed by Ryan Sparks    GLUCOSE, POC    Collection Time: 12/09/21  8:48 PM   Result Value Ref Range    Glucose (POC) 109 65 - 117 mg/dL    Performed by [address] [place] [address] Street, POC    Collection Time: 12/10/21  7:55 AM   Result Value Ref Range    Glucose (POC) 119 (H) 65 - 117 mg/dL    Performed by Shiva Townsend    CBC W/O DIFF    Collection Time: 12/10/21  8:23 AM   Result Value Ref Range    WBC 12.9 (H) 4.1 - 11.1 K/uL    RBC 4.45 4.10 - 5.70 M/uL    HGB 13.2 12.1 - 17.0 g/dL    HCT 39.6 36.6 - 50.3 %    MCV 89.0 80.0 - 99.0 FL    MCH 29.7 26.0 - 34.0 PG    MCHC 33.3 30.0 - 36.5 g/dL    RDW 13.2 11.5 - 14.5 %    PLATELET 990 946 - 274 K/uL    MPV 11.7 8.9 - 12.9 FL    NRBC 0.0 0.0  WBC    ABSOLUTE NRBC 0.00 0.00 - 3.33 K/uL   METABOLIC PANEL, BASIC    Collection Time: 12/10/21  8:23 AM   Result Value Ref Range    Sodium 136 136 - 145 mmol/L    Potassium 4.3 3.5 - 5.1 mmol/L    Chloride 107 97 - 108 mmol/L    CO2 23 21 - 32 mmol/L    Anion gap 6 5 - 15 mmol/L    Glucose 128 (H) 65 - 100 mg/dL    BUN 58 (H) 6 - 20 mg/dL    Creatinine 3.21 (H) 0.70 - 1.30 mg/dL BUN/Creatinine ratio 18 12 - 20      GFR est AA 24 (L) >60 ml/min/1.73m2    GFR est non-AA 20 (L) >60 ml/min/1.73m2    Calcium 8.6 8.5 - 10.1 mg/dL   MAGNESIUM    Collection Time: 12/10/21  8:23 AM   Result Value Ref Range    Magnesium 2.7 (H) 1.6 - 2.4 mg/dL       XR ABD (KUB)   Final Result      XR CHEST PORT   Final Result   No acute findings. XR FLUOROSCOPY UNDER 60 MINUTES   Final Result      CT ABD PELV WO CONT   Final Result   1. 5 x 5 mm obstructing stone in the distal left ureter. Left perinephric   inflammation. Correlate to medical renal disease to include renal dysfunction   and pyelonephritis. 2. Nonobstructing bilateral nephrolithiasis. 3. Thickening versus nondistention of the proximal duodenum. Correlate for any   clinical symptoms. 4. Trace bilateral pleural effusions. 5. Hepatomegaly. 6. Other findings above.       XR CHEST PORT   Final Result           Assessment:  Complicated UTI/pyelonephritis    Sepsis due to above    Acute kidney injury, likely combined postobstructive and ATN from sepsis    Obstructing left ureter stone, status post cystoscopy with stenting    Type II non-STEMI    Diabetes mellitus type 2    Hypertension    Hypothyroidism          Plan:    Sepsis secondary to complicated urinary tract infection/pyelonephritis-of note patient presented with above-mentioned symptomatology consistent with complicated urinary tract infection/pyelonephritis, patient remains hemodynamically stable at this time, remains afebrile, of note patient status post cystoscopy as well as left ureteral stent placement  Follow-up blood cultures  Follow-up urine cultures  Continue maintenance IV fluids  Continue ceftriaxone for antibiotic coverage  Urology consult appreciated, continue to follow recommendations    Non-ST elevation MI-patient presented with above-mentioned symptomatology found have a significant elevated serum troponin, initially thought to be type II non-ST elevation MI, of note transthoracic echo reviewed by cardiology attending, patient does have evidence of wall motion abnormality  Continue aspirin once daily  Completed heparin GTT  Currently serum creatinine is downtrending, discussed with cardiology attending, plan is for cardiac catheterization on Monday  Cardiology consult appreciated, continue to follow recommendations    Hyperlipidemia-continue statin    Acute kidney injury-likely multifactorial, differentials include prerenal versus renal versus postrenal etiologies, currently serum creatinine is downtrending  Continue maintenance IV fluids  Continue to trend serum creatinine  Avoid nephrotoxic medications  Nephrology consult appreciated, continue to follow recommendations    Prophylaxis-Heparin Subcu  FEN-cardiac diet, maintenance IV fluids, replete potassium and magnesium  Full code, will clarify about surrogate decision maker  Disposition-pending clinical improvement/cardiac catheterization/cardiac clearance (cath scheduled on Monday)    Total non critical care time spent 35 mins    Farrah Romero MD

## 2021-12-10 NOTE — PROGRESS NOTES
UROLOGY Progress Note          [phone]      Daily Progress Note: 12/10/2021    Assessment/     Patient Active Problem List   Diagnosis Code    Pyelonephritis N12   Has a history of bilateral nephrolithiasis. 3 mm stone in left ureter with a stent. Is having pain in his right side. Recently KUB yesterday which shows the stone is in the kidney and the ureter while he was having pain. He is due for cardiac cath on Monday 1 to be sure he does need something done today fortunately if his pain with a stent etc.  Obviously if the patient had recent MI I do not want to put in a stent unless he is protected by cardiac stent etc.    PLAN: Follow with you we will place stent if necessary, his creatinine is 3.2 so he is being followed closely    Subjective: The patient is seen in UROLOGIC follow up.       Medications reviewed  Current Facility-Administered Medications   Medication Dose Route Frequency    nystatin (MYCOSTATIN) 100,000 unit/mL oral suspension 500,000 Units  500,000 Units Oral QID    heparin (porcine) injection 5,000 Units  5,000 Units SubCUTAneous Q8H    pantoprazole (PROTONIX) tablet 40 mg  40 mg Oral ACB    tamsulosin (FLOMAX) capsule 0.4 mg  0.4 mg Oral DAILY    atorvastatin (LIPITOR) tablet 80 mg  80 mg Oral DAILY    sodium chloride (NS) flush 5-40 mL  5-40 mL IntraVENous Q8H    sodium chloride (NS) flush 5-40 mL  5-40 mL IntraVENous PRN    acetaminophen (TYLENOL) tablet 650 mg  650 mg Oral Q6H PRN    Or    acetaminophen (TYLENOL) suppository 650 mg  650 mg Rectal Q6H PRN    polyethylene glycol (MIRALAX) packet 17 g  17 g Oral DAILY PRN    ondansetron (ZOFRAN ODT) tablet 4 mg  4 mg Oral Q8H PRN    Or    ondansetron (ZOFRAN) injection 4 mg  4 mg IntraVENous Q6H PRN    HYDROmorphone (DILAUDID) syringe 0.5 mg  0.5 mg IntraVENous Q4H PRN    sodium chloride (NS) flush 5-40 mL  5-40 mL IntraVENous Q8H    cefTRIAXone (ROCEPHIN) 2 g in 0.9% sodium chloride (MBP/ADV) 50 mL MBP  2 g IntraVENous ACB    glucose chewable tablet 16 g  4 Tablet Oral PRN    dextrose (D50W) injection syrg 12.5-25 g  25-50 mL IntraVENous PRN    glucagon (GLUCAGEN) injection 1 mg  1 mg IntraMUSCular PRN    insulin lispro (HUMALOG) injection   SubCUTAneous AC&HS    aspirin delayed-release tablet 81 mg  81 mg Oral DAILY       Review of Systems:   ROS    The patient is seen for follow up. He is a 68-year-old male with is of hypertension, diabetes and hypothyroidism who presented to the ED on 12/6 with left flank pain associated with fever and chills along with nausea and vomiting. CT showed a 5 mm obstructing stone in the distal left ureter with left perinephric inflammation. Patient did meet sepsis criteria. He was started on ceftriaxone.     Patient was admitted to ICU. He underwent cystoscopy with left ureteral stenting on 12/6  -----  Patient seen for follow-up on 12/7. Feeling much better. Labs today show a creatinine of 2.99 up from 2.19 on admission     Troponin is 376, down from 873. He was seen by cardiology who feels this represents a type II non-STEMI  Patient is having bilateral flank pain left and right as well no fevers or chills    Objective:   Physical Exam   General:   Awake and alert   Lungs:   Clear to auscultation bilaterally. Chest wall:  No tenderness or deformity. Heart:  Regular rate and rhythm, S1, S2 normal, no murmur, click, rub or gallop. Abdomen:   Soft, non-tender. Bowel sounds normal. No masses,  No organomegaly. Extremities: Extremities normal, atraumatic, no cyanosis or edema. Pulses: 2+ and symmetric all extremities. Skin: Skin color, texture, turgor normal. No rashes or lesions   Neurologic: CNII-XII intact.   No gross focal deficits             Visit Vitals  BP (!) 155/90 (BP 1 Location: Right upper arm, BP Patient Position: At rest;Semi fowlers)   Pulse 70   Temp 97.6 °F (36.4 °C)   Resp 18   Ht 6' 9\" (2.057 m)   Wt 236 lb (107 kg)   SpO2 94%   BMI 25.29 kg/m² Data Review:       Recent Days:  Recent Labs     12/10/21  0823 12/09/21  0550 12/08/21  1245   WBC 12.9* 13.3* 15.9*   HGB 13.2 12.3 12.3   HCT 39.6 36.8 37.1    141* 127*     Recent Labs     12/10/21  0823 12/09/21  0550 12/08/21  1245    136  --    K 4.3 3.8  --     107  --    CO2 23 21  --    * 108*  --    BUN 58* 64* 62*   CREA 3.21* 3.42* 3.47*   CA 8.6 7.8*  --    MG 2.7* 2.6*  --    PHOS  --  3.6  --      No results for input(s): PH, PCO2, PO2, HCO3, FIO2 in the last 72 hours.       Coty Mena MD

## 2021-12-11 LAB
ANION GAP SERPL CALC-SCNC: 8 MMOL/L (ref 5–15)
BUN SERPL-MCNC: 53 MG/DL (ref 6–20)
BUN/CREAT SERPL: 20 (ref 12–20)
CA-I BLD-MCNC: 8.3 MG/DL (ref 8.5–10.1)
CHLORIDE SERPL-SCNC: 108 MMOL/L (ref 97–108)
CO2 SERPL-SCNC: 22 MMOL/L (ref 21–32)
CREAT SERPL-MCNC: 2.71 MG/DL (ref 0.7–1.3)
ERYTHROCYTE [DISTWIDTH] IN BLOOD BY AUTOMATED COUNT: 13.2 % (ref 11.5–14.5)
GLUCOSE BLD STRIP.AUTO-MCNC: 117 MG/DL (ref 65–117)
GLUCOSE BLD STRIP.AUTO-MCNC: 119 MG/DL (ref 65–117)
GLUCOSE BLD STRIP.AUTO-MCNC: 132 MG/DL (ref 65–117)
GLUCOSE BLD STRIP.AUTO-MCNC: 139 MG/DL (ref 65–117)
GLUCOSE BLD STRIP.AUTO-MCNC: 142 MG/DL (ref 65–117)
GLUCOSE SERPL-MCNC: 145 MG/DL (ref 65–100)
HCT VFR BLD AUTO: 37.7 % (ref 36.6–50.3)
HGB BLD-MCNC: 12.5 G/DL (ref 12.1–17)
MAGNESIUM SERPL-MCNC: 2.2 MG/DL (ref 1.6–2.4)
MCH RBC QN AUTO: 29.3 PG (ref 26–34)
MCHC RBC AUTO-ENTMCNC: 33.2 G/DL (ref 30–36.5)
MCV RBC AUTO: 88.5 FL (ref 80–99)
NRBC # BLD: 0 K/UL (ref 0–0.01)
NRBC BLD-RTO: 0 PER 100 WBC
PERFORMED BY, TECHID: ABNORMAL
PERFORMED BY, TECHID: NORMAL
PLATELET # BLD AUTO: 227 K/UL (ref 150–400)
PMV BLD AUTO: 11.9 FL (ref 8.9–12.9)
POTASSIUM SERPL-SCNC: 4.2 MMOL/L (ref 3.5–5.1)
RBC # BLD AUTO: 4.26 M/UL (ref 4.1–5.7)
SODIUM SERPL-SCNC: 138 MMOL/L (ref 136–145)
WBC # BLD AUTO: 12.9 K/UL (ref 4.1–11.1)

## 2021-12-11 PROCEDURE — 83735 ASSAY OF MAGNESIUM: CPT

## 2021-12-11 PROCEDURE — 99232 SBSQ HOSP IP/OBS MODERATE 35: CPT | Performed by: UROLOGY

## 2021-12-11 PROCEDURE — 74011250637 HC RX REV CODE- 250/637: Performed by: INTERNAL MEDICINE

## 2021-12-11 PROCEDURE — 36415 COLL VENOUS BLD VENIPUNCTURE: CPT

## 2021-12-11 PROCEDURE — 85027 COMPLETE CBC AUTOMATED: CPT

## 2021-12-11 PROCEDURE — 74011250637 HC RX REV CODE- 250/637: Performed by: HOSPITALIST

## 2021-12-11 PROCEDURE — 74011250636 HC RX REV CODE- 250/636: Performed by: INTERNAL MEDICINE

## 2021-12-11 PROCEDURE — 65270000029 HC RM PRIVATE

## 2021-12-11 PROCEDURE — 82962 GLUCOSE BLOOD TEST: CPT

## 2021-12-11 PROCEDURE — 74011000258 HC RX REV CODE- 258: Performed by: INTERNAL MEDICINE

## 2021-12-11 PROCEDURE — 80048 BASIC METABOLIC PNL TOTAL CA: CPT

## 2021-12-11 RX ADMIN — HYDROMORPHONE HYDROCHLORIDE 0.5 MG: 1 INJECTION, SOLUTION INTRAMUSCULAR; INTRAVENOUS; SUBCUTANEOUS at 05:19

## 2021-12-11 RX ADMIN — ATORVASTATIN CALCIUM 80 MG: 40 TABLET, FILM COATED ORAL at 08:06

## 2021-12-11 RX ADMIN — Medication 10 ML: at 20:00

## 2021-12-11 RX ADMIN — Medication 10 ML: at 13:59

## 2021-12-11 RX ADMIN — HEPARIN SODIUM 5000 UNITS: 5000 INJECTION INTRAVENOUS; SUBCUTANEOUS at 21:41

## 2021-12-11 RX ADMIN — CEFTRIAXONE SODIUM 2 G: 2 INJECTION, POWDER, FOR SOLUTION INTRAMUSCULAR; INTRAVENOUS at 08:06

## 2021-12-11 RX ADMIN — HEPARIN SODIUM 5000 UNITS: 5000 INJECTION INTRAVENOUS; SUBCUTANEOUS at 12:11

## 2021-12-11 RX ADMIN — ONDANSETRON 4 MG: 2 INJECTION INTRAMUSCULAR; INTRAVENOUS at 19:58

## 2021-12-11 RX ADMIN — PANTOPRAZOLE SODIUM 40 MG: 40 TABLET, DELAYED RELEASE ORAL at 08:06

## 2021-12-11 RX ADMIN — NYSTATIN 500000 UNITS: 100000 SUSPENSION ORAL at 12:11

## 2021-12-11 RX ADMIN — NYSTATIN 500000 UNITS: 100000 SUSPENSION ORAL at 17:01

## 2021-12-11 RX ADMIN — Medication 10 ML: at 06:27

## 2021-12-11 RX ADMIN — ONDANSETRON 4 MG: 2 INJECTION INTRAMUSCULAR; INTRAVENOUS at 05:19

## 2021-12-11 RX ADMIN — HYDROMORPHONE HYDROCHLORIDE 0.5 MG: 1 INJECTION, SOLUTION INTRAMUSCULAR; INTRAVENOUS; SUBCUTANEOUS at 18:23

## 2021-12-11 RX ADMIN — ACETAMINOPHEN 650 MG: 325 TABLET ORAL at 19:58

## 2021-12-11 RX ADMIN — ACETAMINOPHEN 650 MG: 325 TABLET ORAL at 08:27

## 2021-12-11 RX ADMIN — NYSTATIN 500000 UNITS: 100000 SUSPENSION ORAL at 08:06

## 2021-12-11 RX ADMIN — ASPIRIN 81 MG: 81 TABLET, COATED ORAL at 08:06

## 2021-12-11 RX ADMIN — TAMSULOSIN HYDROCHLORIDE 0.4 MG: 0.4 CAPSULE ORAL at 08:06

## 2021-12-11 RX ADMIN — HYDROMORPHONE HYDROCHLORIDE 0.5 MG: 1 INJECTION, SOLUTION INTRAMUSCULAR; INTRAVENOUS; SUBCUTANEOUS at 12:20

## 2021-12-11 RX ADMIN — NYSTATIN 500000 UNITS: 100000 SUSPENSION ORAL at 21:40

## 2021-12-11 NOTE — PROGRESS NOTES
Hospitalist Progress Note               Daily Progress Note: 12/11/2021      Subjective: The patient is seen for follow up. He is a 75year-old male with is of hypertension, diabetes and hypothyroidism who presented to the ED on 12/6 with left flank pain associated with fever and chills along with nausea and vomiting. CT showed a 5 mm obstructing stone in the distal left ureter with left perinephric inflammation. Patient did meet sepsis criteria. He was started on ceftriaxone. Patient was admitted to ICU. He underwent cystoscopy with left ureteral stenting on 12/6  -----  Patient seen for follow-up on 12/7. Feeling much better. Labs today show a creatinine of 2.99 up from 2.19 on admission    Troponin is 376, down from 873.   He was seen by cardiology who feels this represents a type II non-STEMI    Subjective-patient seen and evaluated at bedside, patient currently has no active/new complaints, discussed with RN at bedside    Problem List:  Problem List as of 12/11/2021 Never Reviewed          Codes Class Noted - Resolved    Pyelonephritis ICD-10-CM: N12  ICD-9-CM: 590.80  12/6/2021 - Present              Medications reviewed  Current Facility-Administered Medications   Medication Dose Route Frequency    nystatin (MYCOSTATIN) 100,000 unit/mL oral suspension 500,000 Units  500,000 Units Oral QID    0.9% sodium chloride infusion  50 mL/hr IntraVENous CONTINUOUS    heparin (porcine) injection 5,000 Units  5,000 Units SubCUTAneous Q8H    pantoprazole (PROTONIX) tablet 40 mg  40 mg Oral ACB    tamsulosin (FLOMAX) capsule 0.4 mg  0.4 mg Oral DAILY    atorvastatin (LIPITOR) tablet 80 mg  80 mg Oral DAILY    sodium chloride (NS) flush 5-40 mL  5-40 mL IntraVENous Q8H    sodium chloride (NS) flush 5-40 mL  5-40 mL IntraVENous PRN    acetaminophen (TYLENOL) tablet 650 mg  650 mg Oral Q6H PRN    Or    acetaminophen (TYLENOL) suppository 650 mg  650 mg Rectal Q6H PRN    polyethylene glycol (MIRALAX) packet 17 g  17 g Oral DAILY PRN    ondansetron (ZOFRAN ODT) tablet 4 mg  4 mg Oral Q8H PRN    Or    ondansetron (ZOFRAN) injection 4 mg  4 mg IntraVENous Q6H PRN    HYDROmorphone (DILAUDID) syringe 0.5 mg  0.5 mg IntraVENous Q4H PRN    sodium chloride (NS) flush 5-40 mL  5-40 mL IntraVENous Q8H    cefTRIAXone (ROCEPHIN) 2 g in 0.9% sodium chloride (MBP/ADV) 50 mL MBP  2 g IntraVENous ACB    glucose chewable tablet 16 g  4 Tablet Oral PRN    dextrose (D50W) injection syrg 12.5-25 g  25-50 mL IntraVENous PRN    glucagon (GLUCAGEN) injection 1 mg  1 mg IntraMUSCular PRN    insulin lispro (HUMALOG) injection   SubCUTAneous AC&HS    aspirin delayed-release tablet 81 mg  81 mg Oral DAILY       Review of Systems:   A comprehensive review of systems was negative except for that written in the HPI. Objective:   Physical Exam:     Visit Vitals  BP (!) 175/94 (BP 1 Location: Left upper arm, BP Patient Position: Supine)   Pulse 60   Temp 98 °F (36.7 °C)   Resp 16   Ht 6' 9\" (2.057 m)   Wt 107 kg (236 lb)   SpO2 94%   BMI 25.29 kg/m²    O2 Flow Rate (L/min): 3 l/min O2 Device: None (Room air)    Temp (24hrs), Av.4 °F (36.9 °C), Min:97.6 °F (36.4 °C), Max:99 °F (37.2 °C)    No intake/output data recorded.  1901 -  0700  In: 240 [P.O.:240]  Out: 4250 [Urine:4250]    General:   Awake and alert   Lungs:   Clear to auscultation bilaterally. Chest wall:  No tenderness or deformity. Heart:  Regular rate and rhythm, S1, S2 normal, no murmur, click, rub or gallop. Abdomen:   Soft, non-tender. Bowel sounds normal. No masses,  No organomegaly. Extremities: Extremities normal, atraumatic, no cyanosis or edema. Pulses: 2+ and symmetric all extremities. Skin: Skin color, texture, turgor normal. No rashes or lesions   Neurologic: CNII-XII intact.   No gross focal deficits         Data Review:       Recent Days:  Recent Labs     21  0901 12/10/21  0823 21  0550   WBC 12.9* 12.9* 13.3*   HGB 12.5 13.2 12.3   HCT 37.7 39.6 36.8    175 141*     Recent Labs     12/11/21  0901 12/10/21  0823 12/09/21  0550    136 136   K 4.2 4.3 3.8    107 107   CO2 22 23 21   * 128* 108*   BUN 53* 58* 64*   CREA 2.71* 3.21* 3.42*   CA 8.3* 8.6 7.8*   MG 2.2 2.7* 2.6*   PHOS  --   --  3.6     No results for input(s): PH, PCO2, PO2, HCO3, FIO2 in the last 72 hours.     24 Hour Results:  Recent Results (from the past 24 hour(s))   GLUCOSE, POC    Collection Time: 12/10/21 11:23 AM   Result Value Ref Range    Glucose (POC) 129 (H) 65 - 117 mg/dL    Performed by Wendy Wallace, POC    Collection Time: 12/10/21  5:15 PM   Result Value Ref Range    Glucose (POC) 151 (H) 65 - 117 mg/dL    Performed by Charles Navarro    GLUCOSE, POC    Collection Time: 12/10/21 10:25 PM   Result Value Ref Range    Glucose (POC) 124 (H) 65 - 117 mg/dL    Performed by [address] [place] [address], POC    Collection Time: 12/11/21  7:50 AM   Result Value Ref Range    Glucose (POC) 132 (H) 65 - 117 mg/dL    Performed by Santiago Yoo    CBC W/O DIFF    Collection Time: 12/11/21  9:01 AM   Result Value Ref Range    WBC 12.9 (H) 4.1 - 11.1 K/uL    RBC 4.26 4.10 - 5.70 M/uL    HGB 12.5 12.1 - 17.0 g/dL    HCT 37.7 36.6 - 50.3 %    MCV 88.5 80.0 - 99.0 FL    MCH 29.3 26.0 - 34.0 PG    MCHC 33.2 30.0 - 36.5 g/dL    RDW 13.2 11.5 - 14.5 %    PLATELET 506 046 - 254 K/uL    MPV 11.9 8.9 - 12.9 FL    NRBC 0.0 0.0  WBC    ABSOLUTE NRBC 0.00 0.00 - 7.06 K/uL   METABOLIC PANEL, BASIC    Collection Time: 12/11/21  9:01 AM   Result Value Ref Range    Sodium 138 136 - 145 mmol/L    Potassium 4.2 3.5 - 5.1 mmol/L    Chloride 108 97 - 108 mmol/L    CO2 22 21 - 32 mmol/L    Anion gap 8 5 - 15 mmol/L    Glucose 145 (H) 65 - 100 mg/dL    BUN 53 (H) 6 - 20 mg/dL    Creatinine 2.71 (H) 0.70 - 1.30 mg/dL    BUN/Creatinine ratio 20 12 - 20      GFR est AA 29 (L) >60 ml/min/1.73m2    GFR est non-AA 24 (L) >60 ml/min/1.73m2 Calcium 8.3 (L) 8.5 - 10.1 mg/dL   MAGNESIUM    Collection Time: 12/11/21  9:01 AM   Result Value Ref Range    Magnesium 2.2 1.6 - 2.4 mg/dL       XR ABD (KUB)   Final Result      XR CHEST PORT   Final Result   No acute findings. XR FLUOROSCOPY UNDER 60 MINUTES   Final Result      CT ABD PELV WO CONT   Final Result   1. 5 x 5 mm obstructing stone in the distal left ureter. Left perinephric   inflammation. Correlate to medical renal disease to include renal dysfunction   and pyelonephritis. 2. Nonobstructing bilateral nephrolithiasis. 3. Thickening versus nondistention of the proximal duodenum. Correlate for any   clinical symptoms. 4. Trace bilateral pleural effusions. 5. Hepatomegaly. 6. Other findings above.       XR CHEST PORT   Final Result           Assessment:  Complicated UTI/pyelonephritis    Sepsis due to above    Acute kidney injury, likely combined postobstructive and ATN from sepsis    Obstructing left ureter stone, status post cystoscopy with stenting    Type II non-STEMI    Diabetes mellitus type 2    Hypertension    Hypothyroidism          Plan:    Sepsis secondary to complicated urinary tract infection/pyelonephritis-of note patient presented with above-mentioned symptomatology consistent with complicated urinary tract infection/pyelonephritis, patient remains hemodynamically stable at this time, remains afebrile, of note patient status post cystoscopy as well as left ureteral stent placement  Follow-up blood cultures  Follow-up urine cultures  Continue maintenance IV fluids  Continue ceftriaxone for antibiotic coverage  Urology consult appreciated, continue to follow recommendations    Non-ST elevation MI-patient presented with above-mentioned symptomatology found have a significant elevated serum troponin, initially thought to be type II non-ST elevation MI, of note transthoracic echo reviewed by cardiology attending, patient does have evidence of wall motion abnormality  Continue aspirin once daily  Completed heparin GTT  Currently serum creatinine is downtrending, discussed with cardiology attending, plan is for cardiac catheterization on Monday  Cardiology consult appreciated, continue to follow recommendations    Hyperlipidemia-continue statin    Right-sided nephrolithiasis-of note patient evaluated by urology yesterday, no urgent need for stent placement at this time  Continue to follow urology recommendations    Oral thrush-because of dysphagia, patient was evaluated by speech and swallow  Started nystatin  Continue to monitor patient's clinical status    Acute kidney injury-likely multifactorial, differentials include prerenal versus renal versus postrenal etiologies, currently serum creatinine is downtrending  Continue maintenance IV fluids  Continue to trend serum creatinine  Avoid nephrotoxic medications  Nephrology consult appreciated, continue to follow recommendations    Prophylaxis-Heparin Subcu  FEN-cardiac diet, maintenance IV fluids, replete potassium and magnesium  Full code, will clarify about surrogate decision maker  Disposition-pending clinical improvement/cardiac catheterization/cardiac clearance (cath scheduled on Monday)    Total non critical care time spent 35 mins    Chris Andres MD

## 2021-12-11 NOTE — PROGRESS NOTES
UROLOGY Progress Note          [phone]      Daily Progress Note: 12/11/2021    Assessment/patient says his flank pain is much better he is still scheduled for cardiac cath Monday. Patient Active Problem List   Diagnosis Code    Pyelonephritis N12       PLAN: We will follow the stones with you the left ureteral stone in right renal stone no intervention necessary at this point in the immediate future    Subjective: The patient is seen in UROLOGIC follow up. Patient says his pain is much better than it was 2 days ago he feeling fine.   Be good spirits      Medications reviewed  Current Facility-Administered Medications   Medication Dose Route Frequency    nystatin (MYCOSTATIN) 100,000 unit/mL oral suspension 500,000 Units  500,000 Units Oral QID    0.9% sodium chloride infusion  50 mL/hr IntraVENous CONTINUOUS    heparin (porcine) injection 5,000 Units  5,000 Units SubCUTAneous Q8H    pantoprazole (PROTONIX) tablet 40 mg  40 mg Oral ACB    tamsulosin (FLOMAX) capsule 0.4 mg  0.4 mg Oral DAILY    atorvastatin (LIPITOR) tablet 80 mg  80 mg Oral DAILY    sodium chloride (NS) flush 5-40 mL  5-40 mL IntraVENous Q8H    sodium chloride (NS) flush 5-40 mL  5-40 mL IntraVENous PRN    acetaminophen (TYLENOL) tablet 650 mg  650 mg Oral Q6H PRN    Or    acetaminophen (TYLENOL) suppository 650 mg  650 mg Rectal Q6H PRN    polyethylene glycol (MIRALAX) packet 17 g  17 g Oral DAILY PRN    ondansetron (ZOFRAN ODT) tablet 4 mg  4 mg Oral Q8H PRN    Or    ondansetron (ZOFRAN) injection 4 mg  4 mg IntraVENous Q6H PRN    HYDROmorphone (DILAUDID) syringe 0.5 mg  0.5 mg IntraVENous Q4H PRN    sodium chloride (NS) flush 5-40 mL  5-40 mL IntraVENous Q8H    cefTRIAXone (ROCEPHIN) 2 g in 0.9% sodium chloride (MBP/ADV) 50 mL MBP  2 g IntraVENous ACB    glucose chewable tablet 16 g  4 Tablet Oral PRN    dextrose (D50W) injection syrg 12.5-25 g  25-50 mL IntraVENous PRN    glucagon (GLUCAGEN) injection 1 mg 1 mg IntraMUSCular PRN    insulin lispro (HUMALOG) injection   SubCUTAneous AC&HS    aspirin delayed-release tablet 81 mg  81 mg Oral DAILY       Review of Systems:   ROS review of system was unremarkable for all 12 systems other than HPI, no chest pain and flank pains better        Objective:   Physical Exam     Well developed, well nourished,alert and oriented x 3, and individual in no acute distress. Ears/Nose/Mouth/Throat:   Hearing grossly normal.          Neck: Supple. Chest:   Lungs clear to auscultation bilaterally. Cardiovascular:  Regular rate and rhythm, S1, S2 normal, no murmur. Abdomen:   Soft, non-tender, bowel sounds are active. Extremities: No edema bilaterally. Skin: Warm and dry.                         Visit Vitals  BP (!) 175/91 (BP 1 Location: Left upper arm, BP Patient Position: Supine; At rest)   Pulse 60   Temp 97.4 °F (36.3 °C)   Resp 16   Ht 6' 9\" (2.057 m)   Wt 236 lb (107 kg)   SpO2 96%   BMI 25.29 kg/m²         Data Review:       Recent Days:  Recent Labs     12/11/21  0901 12/10/21  0823 12/09/21  0550   WBC 12.9* 12.9* 13.3*   HGB 12.5 13.2 12.3   HCT 37.7 39.6 36.8    175 141*     Recent Labs     12/11/21  0901 12/10/21  0823 12/09/21  0550    136 136   K 4.2 4.3 3.8    107 107   CO2 22 23 21   * 128* 108*   BUN 53* 58* 64*   CREA 2.71* 3.21* 3.42*   CA 8.3* 8.6 7.8*   MG 2.2 2.7* 2.6*   PHOS  --   --  3.6     No results for input(s): PH, PCO2, PO2, HCO3, FIO2 in the last 72 hours.       Kirsten Camejo MD

## 2021-12-11 NOTE — PROGRESS NOTES
Nephrology Progress Note  Date:2021       Room:[facility]  Patient Name:Fritz Brewer     YOB: 1960     Age:61 y.o. Subjective    Subjective:  Symptoms:  No shortness of breath, cough or chest pain. Diet:  No nausea or vomiting. Seen and evaluated at bedside, complaints of abdominal distention. No overnight events reported by primary team   UOP: 4250cc    Review of Systems   Constitutional: Negative. Negative for chills, diaphoresis and fever. Respiratory: Negative for cough, chest tightness and shortness of breath. Cardiovascular: Negative for chest pain. Gastrointestinal: Positive for abdominal distention. Negative for nausea, rectal pain and vomiting. Genitourinary: Negative for dysuria. Skin: Negative for pallor. Neurological: Negative for light-headedness. Objective         Vitals Last 24 Hours:  TEMPERATURE:  Temp  Av.4 °F (36.9 °C)  Min: 97.6 °F (36.4 °C)  Max: 99 °F (37.2 °C)  RESPIRATIONS RANGE: Resp  Av.6  Min: 16  Max: 20  PULSE OXIMETRY RANGE: SpO2  Av.4 %  Min: 94 %  Max: 97 %  PULSE RANGE: Pulse  Av.6  Min: 60  Max: 71  BLOOD PRESSURE RANGE: Systolic (66PNN), RHS:518 , Min:133 , RYH:487   ; Diastolic (68YBX), IC, Min:79, Max:96    I/O (24Hr): Intake/Output Summary (Last 24 hours) at 2021 1029  Last data filed at 2021 0515  Gross per 24 hour   Intake 240 ml   Output 2450 ml   Net -2210 ml     Objective:  General Appearance: In no acute distress. Vital signs: (most recent): Blood pressure (!) 175/94, pulse 60, temperature 98 °F (36.7 °C), resp. rate 16, height 6' 9\" (2.057 m), weight 107 kg (236 lb), SpO2 94 %. Vital signs are normal.  No fever. Output: Producing urine. HEENT: Normal HEENT exam.    Lungs:  Normal effort and normal respiratory rate. Breath sounds clear to auscultation. He is not in respiratory distress. No decreased breath sounds. Heart: Normal rate. Regular rhythm. S1 normal and S2 normal.    Abdomen: Abdomen is soft. Bowel sounds are normal.     Pulses: Distal pulses are intact. Neurological: Patient is alert and oriented to person, place and time. Labs/Imaging/Diagnostics    Labs:  CBC:  Recent Labs     12/11/21  0901 12/10/21  0823 12/09/21  0550   WBC 12.9* 12.9* 13.3*   RBC 4.26 4.45 4.16   HGB 12.5 13.2 12.3   HCT 37.7 39.6 36.8   MCV 88.5 89.0 88.5   RDW 13.2 13.2 13.2    175 141*     CHEMISTRIES:  Recent Labs     12/11/21  0901 12/10/21  0823 12/09/21  0550    136 136   K 4.2 4.3 3.8    107 107   CO2 22 23 21   BUN 53* 58* 64*   CA 8.3* 8.6 7.8*   PHOS  --   --  3.6   MG 2.2 2.7* 2.6*   PT/INR:No results for input(s): INR, INREXT in the last 72 hours. No lab exists for component: PROTIME  APTT:  Recent Labs     12/09/21  1335 12/09/21  0550 12/08/21 2051   APTT 28.6 46.8* 31.1     LIVER PROFILE:No results for input(s): AST, ALT in the last 72 hours. No lab exists for component: Everlina Ba, ALKPHOS  Lab Results   Component Value Date/Time    ALT (SGPT) 46 12/07/2021 05:25 AM    AST (SGOT) 52 (H) 12/07/2021 05:25 AM    Alk. phosphatase 117 12/07/2021 05:25 AM    Bilirubin, total 0.8 12/07/2021 05:25 AM       Imaging Last 24 Hours:  No results found.      Recent Results (from the past 24 hour(s))   GLUCOSE, POC    Collection Time: 12/10/21 11:23 AM   Result Value Ref Range    Glucose (POC) 129 (H) 65 - 117 mg/dL    Performed by Keron Stein, POC    Collection Time: 12/10/21  5:15 PM   Result Value Ref Range    Glucose (POC) 151 (H) 65 - 117 mg/dL    Performed by Blanca Burnett    GLUCOSE, POC    Collection Time: 12/10/21 10:25 PM   Result Value Ref Range    Glucose (POC) 124 (H) 65 - 117 mg/dL    Performed by [address] [place] [address], POC    Collection Time: 12/11/21  7:50 AM   Result Value Ref Range    Glucose (POC) 132 (H) 65 - 117 mg/dL    Performed by Cynthia Graham    CBC W/O DIFF    Collection Time: 12/11/21  9:01 AM   Result Value Ref Range    WBC 12.9 (H) 4.1 - 11.1 K/uL    RBC 4.26 4.10 - 5.70 M/uL    HGB 12.5 12.1 - 17.0 g/dL    HCT 37.7 36.6 - 50.3 %    MCV 88.5 80.0 - 99.0 FL    MCH 29.3 26.0 - 34.0 PG    MCHC 33.2 30.0 - 36.5 g/dL    RDW 13.2 11.5 - 14.5 %    PLATELET 071 267 - 091 K/uL    MPV 11.9 8.9 - 12.9 FL    NRBC 0.0 0.0  WBC    ABSOLUTE NRBC 0.00 0.00 - 3.96 K/uL   METABOLIC PANEL, BASIC    Collection Time: 12/11/21  9:01 AM   Result Value Ref Range    Sodium 138 136 - 145 mmol/L    Potassium 4.2 3.5 - 5.1 mmol/L    Chloride 108 97 - 108 mmol/L    CO2 22 21 - 32 mmol/L    Anion gap 8 5 - 15 mmol/L    Glucose 145 (H) 65 - 100 mg/dL    BUN 53 (H) 6 - 20 mg/dL    Creatinine 2.71 (H) 0.70 - 1.30 mg/dL    BUN/Creatinine ratio 20 12 - 20      GFR est AA 29 (L) >60 ml/min/1.73m2    GFR est non-AA 24 (L) >60 ml/min/1.73m2    Calcium 8.3 (L) 8.5 - 10.1 mg/dL   MAGNESIUM    Collection Time: 12/11/21  9:01 AM   Result Value Ref Range    Magnesium 2.2 1.6 - 2.4 mg/dL         Current Facility-Administered Medications:     nystatin (MYCOSTATIN) 100,000 unit/mL oral suspension 500,000 Units, 500,000 Units, Oral, QID, Ciarra Ford MD, 500,000 Units at 12/11/21 0806    0.9% sodium chloride infusion, 50 mL/hr, IntraVENous, CONTINUOUS, Skinny Pisano MD, Last Rate: 50 mL/hr at 12/10/21 1709, 50 mL/hr at 12/10/21 1709    heparin (porcine) injection 5,000 Units, 5,000 Units, SubCUTAneous, Q8H, Ciarra Ford MD, 5,000 Units at 12/10/21 2228    pantoprazole (PROTONIX) tablet 40 mg, 40 mg, Oral, ACB, Ciarra Ford MD, 40 mg at 12/11/21 0806    tamsulosin (FLOMAX) capsule 0.4 mg, 0.4 mg, Oral, DAILY, Skinny Pisano MD, 0.4 mg at 12/11/21 0806    atorvastatin (LIPITOR) tablet 80 mg, 80 mg, Oral, DAILY, Briseida Ott MD, 80 mg at 12/11/21 0806    sodium chloride (NS) flush 5-40 mL, 5-40 mL, IntraVENous, Q8H, Silvia Fofana MD, 10 mL at 12/11/21 5001   sodium chloride (NS) flush 5-40 mL, 5-40 mL, IntraVENous, PRN, Mumtaz Patino MD    acetaminophen (TYLENOL) tablet 650 mg, 650 mg, Oral, Q6H PRN, 650 mg at 12/11/21 0827 **OR** acetaminophen (TYLENOL) suppository 650 mg, 650 mg, Rectal, Q6H PRN, Mumtaz Patino MD    polyethylene glycol [facility]) packet 17 g, 17 g, Oral, DAILY PRN, Mumtaz Patino MD    ondansetron [facility] ODT) tablet 4 mg, 4 mg, Oral, Q8H PRN, 4 mg at 12/08/21 1804 **OR** ondansetron (ZOFRAN) injection 4 mg, 4 mg, IntraVENous, Q6H PRN, Nick Lee MD, 4 mg at 12/11/21 0519    HYDROmorphone (DILAUDID) syringe 0.5 mg, 0.5 mg, IntraVENous, Q4H PRN, Nick Lee MD, 0.5 mg at 12/11/21 0519    sodium chloride (NS) flush 5-40 mL, 5-40 mL, IntraVENous, Q8H, Cecelia Mayorga MD, 10 mL at 12/10/21 1326    cefTRIAXone (ROCEPHIN) 2 g in 0.9% sodium chloride (MBP/ADV) 50 mL MBP, 2 g, IntraVENous, ACB, Nick Lee MD, Last Rate: 100 mL/hr at 12/11/21 0806, 2 g at 12/11/21 0806    glucose chewable tablet 16 g, 4 Tablet, Oral, PRN, Nick Lee MD    dextrose (D50W) injection syrg 12.5-25 g, 25-50 mL, IntraVENous, PRN, Mumtaz Patino MD    glucagon [facility]) injection 1 mg, 1 mg, IntraMUSCular, PRN, Nick Lee MD    insulin lispro (HUMALOG) injection, , SubCUTAneous, AC&HS, Nick Lee MD, 2 Units at 12/10/21 1741    aspirin delayed-release tablet 81 mg, 81 mg, Oral, DAILY, Caridad Bridges MD, 81 mg at 12/11/21 0806    Assessment//Plan   Active Problems:    Pyelonephritis (12/6/2021)      Assessment & Plan      1. Non oliguric FERMIN sec to obstructive uropathy-  Scr peaked at 3.47 at present continues to show renal recovery w/ Scr at 2.71  C/w Strict I/O   Continue to avoid nephrotoxins as much as possible  Daily BMP for additional monitoring     2. DM- c/w strict glycemic control as per primary team     3. Anemia of chronic disease - h/h stable. C/w  Cbc trend for additional monitoring     4.  Hx of CKD stage 4- continue to adjust all meds to current GFR   C/w renal diet      5. Secondary hyperparathyroidism - new phosp levels requested      6. Hx of Obstructive uropathy c/b pyelonephritis- Actively being followed by   C/w renally adjusted antibiotics    7.  NSTEMI II- management as per cardiology     Electronically signed by Yaya Lynn MD on 12/11/2021 at 10:29 AM

## 2021-12-11 NOTE — PROGRESS NOTES
Cardiology Progress Note      12/11/2021 10:38 AM    Admit Date: 12/6/2021    Admit Diagnosis: Pyelonephritis [N12]      Subjective:   Patient seen and examined. He remains chest pain-free. No overnight events. Visit Vitals  BP (!) 175/91 (BP 1 Location: Left upper arm, BP Patient Position: Supine; At rest)   Pulse 60   Temp 97.4 °F (36.3 °C)   Resp 16   Ht 6' 9\" (2.057 m)   Wt 107 kg (236 lb)   SpO2 96%   BMI 25.29 kg/m²     Current Facility-Administered Medications   Medication Dose Route Frequency    nystatin (MYCOSTATIN) 100,000 unit/mL oral suspension 500,000 Units  500,000 Units Oral QID    0.9% sodium chloride infusion  50 mL/hr IntraVENous CONTINUOUS    heparin (porcine) injection 5,000 Units  5,000 Units SubCUTAneous Q8H    pantoprazole (PROTONIX) tablet 40 mg  40 mg Oral ACB    tamsulosin (FLOMAX) capsule 0.4 mg  0.4 mg Oral DAILY    atorvastatin (LIPITOR) tablet 80 mg  80 mg Oral DAILY    sodium chloride (NS) flush 5-40 mL  5-40 mL IntraVENous Q8H    sodium chloride (NS) flush 5-40 mL  5-40 mL IntraVENous PRN    acetaminophen (TYLENOL) tablet 650 mg  650 mg Oral Q6H PRN    Or    acetaminophen (TYLENOL) suppository 650 mg  650 mg Rectal Q6H PRN    polyethylene glycol (MIRALAX) packet 17 g  17 g Oral DAILY PRN    ondansetron (ZOFRAN ODT) tablet 4 mg  4 mg Oral Q8H PRN    Or    ondansetron (ZOFRAN) injection 4 mg  4 mg IntraVENous Q6H PRN    HYDROmorphone (DILAUDID) syringe 0.5 mg  0.5 mg IntraVENous Q4H PRN    sodium chloride (NS) flush 5-40 mL  5-40 mL IntraVENous Q8H    cefTRIAXone (ROCEPHIN) 2 g in 0.9% sodium chloride (MBP/ADV) 50 mL MBP  2 g IntraVENous ACB    glucose chewable tablet 16 g  4 Tablet Oral PRN    dextrose (D50W) injection syrg 12.5-25 g  25-50 mL IntraVENous PRN    glucagon (GLUCAGEN) injection 1 mg  1 mg IntraMUSCular PRN    insulin lispro (HUMALOG) injection   SubCUTAneous AC&HS    aspirin delayed-release tablet 81 mg  81 mg Oral DAILY         Objective: Physical Exam:  Visit Vitals  BP (!) 175/91 (BP 1 Location: Left upper arm, BP Patient Position: Supine; At rest)   Pulse 60   Temp 97.4 °F (36.3 °C)   Resp 16   Ht 6' 9\" (2.057 m)   Wt 107 kg (236 lb)   SpO2 96%   BMI 25.29 kg/m²     General Appearance:  Well developed, well nourished,alert and oriented x 3, and individual in no acute distress. Ears/Nose/Mouth/Throat:   Hearing grossly normal.         Neck: Supple. Chest:   Lungs clear to auscultation bilaterally. Cardiovascular:  Regular rate and rhythm, S1, S2 normal, no murmur. Abdomen:   Soft, non-tender, bowel sounds are active. Extremities: No edema bilaterally. Skin: Warm and dry.                Data Review:   Labs:    Recent Results (from the past 24 hour(s))   GLUCOSE, POC    Collection Time: 12/10/21  5:15 PM   Result Value Ref Range    Glucose (POC) 151 (H) 65 - 117 mg/dL    Performed by Vivek Joshua    GLUCOSE, POC    Collection Time: 12/10/21 10:25 PM   Result Value Ref Range    Glucose (POC) 124 (H) 65 - 117 mg/dL    Performed by [address] [place] [address], POC    Collection Time: 12/11/21  7:50 AM   Result Value Ref Range    Glucose (POC) 132 (H) 65 - 117 mg/dL    Performed by Julia Meraz    CBC W/O DIFF    Collection Time: 12/11/21  9:01 AM   Result Value Ref Range    WBC 12.9 (H) 4.1 - 11.1 K/uL    RBC 4.26 4.10 - 5.70 M/uL    HGB 12.5 12.1 - 17.0 g/dL    HCT 37.7 36.6 - 50.3 %    MCV 88.5 80.0 - 99.0 FL    MCH 29.3 26.0 - 34.0 PG    MCHC 33.2 30.0 - 36.5 g/dL    RDW 13.2 11.5 - 14.5 %    PLATELET 002 163 - 284 K/uL    MPV 11.9 8.9 - 12.9 FL    NRBC 0.0 0.0  WBC    ABSOLUTE NRBC 0.00 0.00 - 4.09 K/uL   METABOLIC PANEL, BASIC    Collection Time: 12/11/21  9:01 AM   Result Value Ref Range    Sodium 138 136 - 145 mmol/L    Potassium 4.2 3.5 - 5.1 mmol/L    Chloride 108 97 - 108 mmol/L    CO2 22 21 - 32 mmol/L    Anion gap 8 5 - 15 mmol/L    Glucose 145 (H) 65 - 100 mg/dL    BUN 53 (H) 6 - 20 mg/dL    Creatinine 2.71 (H) 0.70 - 1.30 mg/dL    BUN/Creatinine ratio 20 12 - 20      GFR est AA 29 (L) >60 ml/min/1.73m2    GFR est non-AA 24 (L) >60 ml/min/1.73m2    Calcium 8.3 (L) 8.5 - 10.1 mg/dL   MAGNESIUM    Collection Time: 12/11/21  9:01 AM   Result Value Ref Range    Magnesium 2.2 1.6 - 2.4 mg/dL   GLUCOSE, POC    Collection Time: 12/11/21 11:26 AM   Result Value Ref Range    Glucose (POC) 119 (H) 65 - 117 mg/dL    Performed by Celeste Roman        Telemetry: normal sinus rhythm      Assessment:   NSTEMI type II  Pyelonephritis status post ureteral stent  Acute kidney injury  Diabetes mellitus  Hypertension  Hyperlipidemia    Plan:   26-year-old male with a past medical history as above who is seen for elevated troponins after undergoing ureteral stent placement.  -Patient has had no clear anginal symptoms but he has significantly elevated troponins which are now downtrending. I reviewed his transthoracic echocardiogram which shows mild LV dysfunction with lateral, anterolateral, inferolateral wall motion abnormality consistent with coronary artery disease in the left circumflex/right coronary artery territory  -Renal function is improving now. -Continue aspirin, high intensity statin. Add beta-blockers as hemodynamically able. Resting heart rate is in the 50s hence I am reluctant to start this yet  -Once his presenting problems have stabilized and renal status has been optimized, he will need cardiac catheterization for further evaluation. Anticipate this for Monday or when kidney function normalizes, possibly Tuesday. I explained this to the patient as well.  He understands and agrees  -We will continue to follow the patient and give further recommendations pending his clinical course.     Thank you for allowing us to participate in the care of your patient

## 2021-12-11 NOTE — PROGRESS NOTES
Patient is alert and oriented and ambulatory to bathroom. Taking dilaudid for mouth pain with good relief. Sinus jena on the monitor. Problem: Falls - Risk of  Goal: *Absence of Falls  Description: Document Annalisa Stone Fall Risk and appropriate interventions in the flowsheet.   Outcome: Progressing Towards Goal  Note: Fall Risk Interventions:            Medication Interventions: Patient to call before getting OOB    Elimination Interventions: Patient to call for help with toileting needs              Problem: Patient Education: Go to Patient Education Activity  Goal: Patient/Family Education  Outcome: Progressing Towards Goal     Problem: Patient Education: Go to Patient Education Activity  Goal: Patient/Family Education  Outcome: Progressing Towards Goal     Problem: Sepsis: Day of Diagnosis  Goal: Off Pathway (Use only if patient is Off Pathway)  Outcome: Progressing Towards Goal  Goal: *Fluid resuscitation  Outcome: Progressing Towards Goal  Goal: *Paired blood cultures prior to first dose of antibiotic  Outcome: Progressing Towards Goal  Goal: *First dose of  appropriate antibiotic within 3 hours of arrival to ED, within 1 hour of arrival to ICU  Outcome: Progressing Towards Goal  Goal: *Lactic acid with first set of blood cultures  Outcome: Progressing Towards Goal  Goal: *Pneumococcal immunization (if eligible)  Outcome: Progressing Towards Goal  Goal: *Influenza immunization (if eligible)  Outcome: Progressing Towards Goal  Goal: Activity/Safety  Outcome: Progressing Towards Goal  Goal: Consults, if ordered  Outcome: Progressing Towards Goal  Goal: Diagnostic Test/Procedures  Outcome: Progressing Towards Goal  Goal: Nutrition/Diet  Outcome: Progressing Towards Goal  Goal: Discharge Planning  Outcome: Progressing Towards Goal  Goal: Medications  Outcome: Progressing Towards Goal  Goal: Respiratory  Outcome: Progressing Towards Goal  Goal: Treatments/Interventions/Procedures  Outcome: Progressing Towards Goal  Goal: Psychosocial  Outcome: Progressing Towards Goal     Problem: Sepsis: Day 2  Goal: Off Pathway (Use only if patient is Off Pathway)  Outcome: Progressing Towards Goal  Goal: *Central Venous Pressure maintained at 8-12 mm Hg  Outcome: Progressing Towards Goal  Goal: *Hemodynamically stable  Outcome: Progressing Towards Goal  Goal: *Tolerating diet  Outcome: Progressing Towards Goal  Goal: Activity/Safety  Outcome: Progressing Towards Goal  Goal: Consults, if ordered  Outcome: Progressing Towards Goal  Goal: Diagnostic Test/Procedures  Outcome: Progressing Towards Goal  Goal: Nutrition/Diet  Outcome: Progressing Towards Goal  Goal: Discharge Planning  Outcome: Progressing Towards Goal  Goal: Medications  Outcome: Progressing Towards Goal  Goal: Respiratory  Outcome: Progressing Towards Goal  Goal: Treatments/Interventions/Procedures  Outcome: Progressing Towards Goal  Goal: Psychosocial  Outcome: Progressing Towards Goal     Problem: Sepsis: Day 3  Goal: Off Pathway (Use only if patient is Off Pathway)  Outcome: Progressing Towards Goal  Goal: *Central Venous Pressure maintained at 8-12 mm Hg  Outcome: Progressing Towards Goal  Goal: *Oxygen saturation within defined limits  Outcome: Progressing Towards Goal  Goal: *Vital sign stability  Outcome: Progressing Towards Goal  Goal: *Tolerating diet  Outcome: Progressing Towards Goal  Goal: *Demonstrates progressive activity  Outcome: Progressing Towards Goal  Goal: Activity/Safety  Outcome: Progressing Towards Goal  Goal: Consults, if ordered  Outcome: Progressing Towards Goal  Goal: Diagnostic Test/Procedures  Outcome: Progressing Towards Goal  Goal: Nutrition/Diet  Outcome: Progressing Towards Goal  Goal: Discharge Planning  Outcome: Progressing Towards Goal  Goal: Medications  Outcome: Progressing Towards Goal  Goal: Respiratory  Outcome: Progressing Towards Goal  Goal: Treatments/Interventions/Procedures  Outcome: Progressing Towards Goal  Goal: Psychosocial  Outcome: Progressing Towards Goal     Problem: Sepsis: Day 4  Goal: Off Pathway (Use only if patient is Off Pathway)  Outcome: Progressing Towards Goal  Goal: Activity/Safety  Outcome: Progressing Towards Goal  Goal: Consults, if ordered  Outcome: Progressing Towards Goal  Goal: Diagnostic Test/Procedures  Outcome: Progressing Towards Goal  Goal: Nutrition/Diet  Outcome: Progressing Towards Goal  Goal: Discharge Planning  Outcome: Progressing Towards Goal  Goal: Medications  Outcome: Progressing Towards Goal  Goal: Respiratory  Outcome: Progressing Towards Goal  Goal: Treatments/Interventions/Procedures  Outcome: Progressing Towards Goal  Goal: Psychosocial  Outcome: Progressing Towards Goal  Goal: *Oxygen saturation within defined limits  Outcome: Progressing Towards Goal  Goal: *Hemodynamically stable  Outcome: Progressing Towards Goal  Goal: *Vital signs within defined limits  Outcome: Progressing Towards Goal  Goal: *Tolerating diet  Outcome: Progressing Towards Goal  Goal: *Demonstrates progressive activity  Outcome: Progressing Towards Goal  Goal: *Fluid volume maintenance  Outcome: Progressing Towards Goal     Problem: Sepsis: Day 5  Goal: Off Pathway (Use only if patient is Off Pathway)  Outcome: Progressing Towards Goal  Goal: *Oxygen saturation within defined limits  Outcome: Progressing Towards Goal  Goal: *Vital signs within defined limits  Outcome: Progressing Towards Goal  Goal: *Tolerating diet  Outcome: Progressing Towards Goal  Goal: *Demonstrates progressive activity  Outcome: Progressing Towards Goal  Goal: *Discharge plan identified  Outcome: Progressing Towards Goal  Goal: Activity/Safety  Outcome: Progressing Towards Goal  Goal: Consults, if ordered  Outcome: Progressing Towards Goal  Goal: Diagnostic Test/Procedures  Outcome: Progressing Towards Goal  Goal: Nutrition/Diet  Outcome: Progressing Towards Goal  Goal: Discharge Planning  Outcome: Progressing Towards Goal  Goal: Medications  Outcome: Progressing Towards Goal  Goal: Respiratory  Outcome: Progressing Towards Goal  Goal: Treatments/Interventions/Procedures  Outcome: Progressing Towards Goal  Goal: Psychosocial  Outcome: Progressing Towards Goal     Problem: Sepsis: Day 6  Goal: Off Pathway (Use only if patient is Off Pathway)  Outcome: Progressing Towards Goal  Goal: *Oxygen saturation within defined limits  Outcome: Progressing Towards Goal  Goal: *Vital signs within defined limits  Outcome: Progressing Towards Goal  Goal: *Tolerating diet  Outcome: Progressing Towards Goal  Goal: *Demonstrates progressive activity  Outcome: Progressing Towards Goal  Goal: Influenza immunization  Outcome: Progressing Towards Goal  Goal: *Pneumococcal immunization  Outcome: Progressing Towards Goal  Goal: Activity/Safety  Outcome: Progressing Towards Goal  Goal: Diagnostic Test/Procedures  Outcome: Progressing Towards Goal  Goal: Nutrition/Diet  Outcome: Progressing Towards Goal  Goal: Discharge Planning  Outcome: Progressing Towards Goal  Goal: Medications  Outcome: Progressing Towards Goal  Goal: Respiratory  Outcome: Progressing Towards Goal  Goal: Treatments/Interventions/Procedures  Outcome: Progressing Towards Goal  Goal: Psychosocial  Outcome: Progressing Towards Goal     Problem: Sepsis: Discharge Outcomes  Goal: *Vital signs within defined limits  Outcome: Progressing Towards Goal  Goal: *Tolerating diet  Outcome: Progressing Towards Goal  Goal: *Verbalizes understanding and describes prescribed diet  Outcome: Progressing Towards Goal  Goal: *Demonstrates progressive activity  Outcome: Progressing Towards Goal  Goal: *Describes follow-up/return visits to physicians  Outcome: Progressing Towards Goal  Goal: *Verbalizes name, dosage, time, side effects, and number of days to continue medications  Outcome: Progressing Towards Goal  Goal: *Influenza immunization (Oct-Mar only)  Outcome: Progressing Towards Goal  Goal: *Pneumococcal immunization  Outcome: Progressing Towards Goal  Goal: *Lungs clear or at baseline  Outcome: Progressing Towards Goal  Goal: *Oxygen saturation returns to baseline or 90% or better on room air  Outcome: Progressing Towards Goal  Goal: *Glycemic control  Outcome: Progressing Towards Goal  Goal: *Absence of deep venous thrombosis signs and symptoms(Stroke Metric)  Outcome: Progressing Towards Goal  Goal: *Describes available resources and support systems  Outcome: Progressing Towards Goal  Goal: *Optimal pain control at patient's stated goal  Outcome: Progressing Towards Goal

## 2021-12-12 LAB
ANION GAP SERPL CALC-SCNC: 7 MMOL/L (ref 5–15)
BUN SERPL-MCNC: 42 MG/DL (ref 6–20)
BUN/CREAT SERPL: 18 (ref 12–20)
CA-I BLD-MCNC: 8.3 MG/DL (ref 8.5–10.1)
CHLORIDE SERPL-SCNC: 107 MMOL/L (ref 97–108)
CO2 SERPL-SCNC: 24 MMOL/L (ref 21–32)
CREAT SERPL-MCNC: 2.36 MG/DL (ref 0.7–1.3)
ERYTHROCYTE [DISTWIDTH] IN BLOOD BY AUTOMATED COUNT: 13.2 % (ref 11.5–14.5)
GLUCOSE BLD STRIP.AUTO-MCNC: 136 MG/DL (ref 65–117)
GLUCOSE BLD STRIP.AUTO-MCNC: 139 MG/DL (ref 65–117)
GLUCOSE BLD STRIP.AUTO-MCNC: 148 MG/DL (ref 65–117)
GLUCOSE SERPL-MCNC: 160 MG/DL (ref 65–100)
HCT VFR BLD AUTO: 35.8 % (ref 36.6–50.3)
HGB BLD-MCNC: 11.9 G/DL (ref 12.1–17)
MAGNESIUM SERPL-MCNC: 2.1 MG/DL (ref 1.6–2.4)
MCH RBC QN AUTO: 29.5 PG (ref 26–34)
MCHC RBC AUTO-ENTMCNC: 33.2 G/DL (ref 30–36.5)
MCV RBC AUTO: 88.8 FL (ref 80–99)
NRBC # BLD: 0 K/UL (ref 0–0.01)
NRBC BLD-RTO: 0 PER 100 WBC
PERFORMED BY, TECHID: ABNORMAL
PLATELET # BLD AUTO: 251 K/UL (ref 150–400)
PMV BLD AUTO: 12 FL (ref 8.9–12.9)
POTASSIUM SERPL-SCNC: 4.1 MMOL/L (ref 3.5–5.1)
RBC # BLD AUTO: 4.03 M/UL (ref 4.1–5.7)
SODIUM SERPL-SCNC: 138 MMOL/L (ref 136–145)
WBC # BLD AUTO: 12.5 K/UL (ref 4.1–11.1)

## 2021-12-12 PROCEDURE — 74011636637 HC RX REV CODE- 636/637: Performed by: INTERNAL MEDICINE

## 2021-12-12 PROCEDURE — 74011250637 HC RX REV CODE- 250/637: Performed by: INTERNAL MEDICINE

## 2021-12-12 PROCEDURE — 80048 BASIC METABOLIC PNL TOTAL CA: CPT

## 2021-12-12 PROCEDURE — 74011250636 HC RX REV CODE- 250/636: Performed by: INTERNAL MEDICINE

## 2021-12-12 PROCEDURE — 65270000029 HC RM PRIVATE

## 2021-12-12 PROCEDURE — 85027 COMPLETE CBC AUTOMATED: CPT

## 2021-12-12 PROCEDURE — 82962 GLUCOSE BLOOD TEST: CPT

## 2021-12-12 PROCEDURE — 74011250637 HC RX REV CODE- 250/637: Performed by: HOSPITALIST

## 2021-12-12 PROCEDURE — 83735 ASSAY OF MAGNESIUM: CPT

## 2021-12-12 PROCEDURE — 36415 COLL VENOUS BLD VENIPUNCTURE: CPT

## 2021-12-12 PROCEDURE — 74011000258 HC RX REV CODE- 258: Performed by: INTERNAL MEDICINE

## 2021-12-12 RX ADMIN — ACETAMINOPHEN 650 MG: 325 TABLET ORAL at 19:51

## 2021-12-12 RX ADMIN — CEFTRIAXONE SODIUM 2 G: 2 INJECTION, POWDER, FOR SOLUTION INTRAMUSCULAR; INTRAVENOUS at 08:30

## 2021-12-12 RX ADMIN — Medication 10 ML: at 22:03

## 2021-12-12 RX ADMIN — TAMSULOSIN HYDROCHLORIDE 0.4 MG: 0.4 CAPSULE ORAL at 08:30

## 2021-12-12 RX ADMIN — ASPIRIN 81 MG: 81 TABLET, COATED ORAL at 08:30

## 2021-12-12 RX ADMIN — NYSTATIN 500000 UNITS: 100000 SUSPENSION ORAL at 17:02

## 2021-12-12 RX ADMIN — Medication 10 ML: at 06:20

## 2021-12-12 RX ADMIN — ONDANSETRON 4 MG: 2 INJECTION INTRAMUSCULAR; INTRAVENOUS at 06:19

## 2021-12-12 RX ADMIN — NYSTATIN 500000 UNITS: 100000 SUSPENSION ORAL at 12:25

## 2021-12-12 RX ADMIN — HYDROMORPHONE HYDROCHLORIDE 0.5 MG: 1 INJECTION, SOLUTION INTRAMUSCULAR; INTRAVENOUS; SUBCUTANEOUS at 16:55

## 2021-12-12 RX ADMIN — HEPARIN SODIUM 5000 UNITS: 5000 INJECTION INTRAVENOUS; SUBCUTANEOUS at 06:20

## 2021-12-12 RX ADMIN — ONDANSETRON 4 MG: 2 INJECTION INTRAMUSCULAR; INTRAVENOUS at 16:55

## 2021-12-12 RX ADMIN — HYDROMORPHONE HYDROCHLORIDE 0.5 MG: 1 INJECTION, SOLUTION INTRAMUSCULAR; INTRAVENOUS; SUBCUTANEOUS at 06:20

## 2021-12-12 RX ADMIN — HYDROMORPHONE HYDROCHLORIDE 0.5 MG: 1 INJECTION, SOLUTION INTRAMUSCULAR; INTRAVENOUS; SUBCUTANEOUS at 11:41

## 2021-12-12 RX ADMIN — ACETAMINOPHEN 650 MG: 325 TABLET ORAL at 16:55

## 2021-12-12 RX ADMIN — ATORVASTATIN CALCIUM 80 MG: 40 TABLET, FILM COATED ORAL at 08:30

## 2021-12-12 RX ADMIN — INSULIN LISPRO 2 UNITS: 100 INJECTION, SOLUTION INTRAVENOUS; SUBCUTANEOUS at 07:30

## 2021-12-12 RX ADMIN — NYSTATIN 500000 UNITS: 100000 SUSPENSION ORAL at 08:31

## 2021-12-12 RX ADMIN — NYSTATIN 500000 UNITS: 100000 SUSPENSION ORAL at 22:05

## 2021-12-12 RX ADMIN — PANTOPRAZOLE SODIUM 40 MG: 40 TABLET, DELAYED RELEASE ORAL at 08:30

## 2021-12-12 RX ADMIN — INSULIN LISPRO 2 UNITS: 100 INJECTION, SOLUTION INTRAVENOUS; SUBCUTANEOUS at 16:41

## 2021-12-12 RX ADMIN — Medication 10 ML: at 13:17

## 2021-12-12 RX ADMIN — HEPARIN SODIUM 5000 UNITS: 5000 INJECTION INTRAVENOUS; SUBCUTANEOUS at 12:25

## 2021-12-12 RX ADMIN — HYDROMORPHONE HYDROCHLORIDE 0.5 MG: 1 INJECTION, SOLUTION INTRAMUSCULAR; INTRAVENOUS; SUBCUTANEOUS at 22:02

## 2021-12-12 RX ADMIN — ONDANSETRON 4 MG: 2 INJECTION INTRAMUSCULAR; INTRAVENOUS at 11:41

## 2021-12-12 RX ADMIN — HEPARIN SODIUM 5000 UNITS: 5000 INJECTION INTRAVENOUS; SUBCUTANEOUS at 22:02

## 2021-12-12 NOTE — PROGRESS NOTES
Problem: Falls - Risk of  Goal: *Absence of Falls  Description: Document Wilfredo Mak Fall Risk and appropriate interventions in the flowsheet.   Outcome: Progressing Towards Goal  Note: Fall Risk Interventions:            Medication Interventions: Patient to call before getting OOB    Elimination Interventions: Patient to call for help with toileting needs              Problem: Patient Education: Go to Patient Education Activity  Goal: Patient/Family Education  Outcome: Progressing Towards Goal     Problem: Patient Education: Go to Patient Education Activity  Goal: Patient/Family Education  Outcome: Progressing Towards Goal     Problem: Sepsis: Day of Diagnosis  Goal: Off Pathway (Use only if patient is Off Pathway)  Outcome: Progressing Towards Goal  Goal: *Fluid resuscitation  Outcome: Progressing Towards Goal  Goal: *Paired blood cultures prior to first dose of antibiotic  Outcome: Progressing Towards Goal  Goal: *First dose of  appropriate antibiotic within 3 hours of arrival to ED, within 1 hour of arrival to ICU  Outcome: Progressing Towards Goal  Goal: *Lactic acid with first set of blood cultures  Outcome: Progressing Towards Goal  Goal: *Pneumococcal immunization (if eligible)  Outcome: Progressing Towards Goal  Goal: *Influenza immunization (if eligible)  Outcome: Progressing Towards Goal  Goal: Activity/Safety  Outcome: Progressing Towards Goal  Goal: Consults, if ordered  Outcome: Progressing Towards Goal  Goal: Diagnostic Test/Procedures  Outcome: Progressing Towards Goal  Goal: Nutrition/Diet  Outcome: Progressing Towards Goal  Goal: Discharge Planning  Outcome: Progressing Towards Goal  Goal: Medications  Outcome: Progressing Towards Goal  Goal: Respiratory  Outcome: Progressing Towards Goal  Goal: Treatments/Interventions/Procedures  Outcome: Progressing Towards Goal  Goal: Psychosocial  Outcome: Progressing Towards Goal     Problem: Sepsis: Day 2  Goal: Off Pathway (Use only if patient is Off Pathway)  Outcome: Progressing Towards Goal  Goal: *Central Venous Pressure maintained at 8-12 mm Hg  Outcome: Progressing Towards Goal  Goal: *Hemodynamically stable  Outcome: Progressing Towards Goal  Goal: *Tolerating diet  Outcome: Progressing Towards Goal  Goal: Activity/Safety  Outcome: Progressing Towards Goal  Goal: Consults, if ordered  Outcome: Progressing Towards Goal  Goal: Diagnostic Test/Procedures  Outcome: Progressing Towards Goal  Goal: Nutrition/Diet  Outcome: Progressing Towards Goal  Goal: Discharge Planning  Outcome: Progressing Towards Goal  Goal: Medications  Outcome: Progressing Towards Goal  Goal: Respiratory  Outcome: Progressing Towards Goal  Goal: Treatments/Interventions/Procedures  Outcome: Progressing Towards Goal  Goal: Psychosocial  Outcome: Progressing Towards Goal     Problem: Sepsis: Day 3  Goal: Off Pathway (Use only if patient is Off Pathway)  Outcome: Progressing Towards Goal  Goal: *Central Venous Pressure maintained at 8-12 mm Hg  Outcome: Progressing Towards Goal  Goal: *Oxygen saturation within defined limits  Outcome: Progressing Towards Goal  Goal: *Vital sign stability  Outcome: Progressing Towards Goal  Goal: *Tolerating diet  Outcome: Progressing Towards Goal  Goal: *Demonstrates progressive activity  Outcome: Progressing Towards Goal  Goal: Activity/Safety  Outcome: Progressing Towards Goal  Goal: Consults, if ordered  Outcome: Progressing Towards Goal  Goal: Diagnostic Test/Procedures  Outcome: Progressing Towards Goal  Goal: Nutrition/Diet  Outcome: Progressing Towards Goal  Goal: Discharge Planning  Outcome: Progressing Towards Goal  Goal: Medications  Outcome: Progressing Towards Goal  Goal: Respiratory  Outcome: Progressing Towards Goal  Goal: Treatments/Interventions/Procedures  Outcome: Progressing Towards Goal  Goal: Psychosocial  Outcome: Progressing Towards Goal     Problem: Sepsis: Day 4  Goal: Off Pathway (Use only if patient is Off Pathway)  Outcome: Progressing Towards Goal  Goal: Activity/Safety  Outcome: Progressing Towards Goal  Goal: Consults, if ordered  Outcome: Progressing Towards Goal  Goal: Diagnostic Test/Procedures  Outcome: Progressing Towards Goal  Goal: Nutrition/Diet  Outcome: Progressing Towards Goal  Goal: Discharge Planning  Outcome: Progressing Towards Goal  Goal: Medications  Outcome: Progressing Towards Goal  Goal: Respiratory  Outcome: Progressing Towards Goal  Goal: Treatments/Interventions/Procedures  Outcome: Progressing Towards Goal  Goal: Psychosocial  Outcome: Progressing Towards Goal  Goal: *Oxygen saturation within defined limits  Outcome: Progressing Towards Goal  Goal: *Hemodynamically stable  Outcome: Progressing Towards Goal  Goal: *Vital signs within defined limits  Outcome: Progressing Towards Goal  Goal: *Tolerating diet  Outcome: Progressing Towards Goal  Goal: *Demonstrates progressive activity  Outcome: Progressing Towards Goal  Goal: *Fluid volume maintenance  Outcome: Progressing Towards Goal     Problem: Sepsis: Day 5  Goal: Off Pathway (Use only if patient is Off Pathway)  Outcome: Progressing Towards Goal  Goal: *Oxygen saturation within defined limits  Outcome: Progressing Towards Goal  Goal: *Vital signs within defined limits  Outcome: Progressing Towards Goal  Goal: *Tolerating diet  Outcome: Progressing Towards Goal  Goal: *Demonstrates progressive activity  Outcome: Progressing Towards Goal  Goal: *Discharge plan identified  Outcome: Progressing Towards Goal  Goal: Activity/Safety  Outcome: Progressing Towards Goal  Goal: Consults, if ordered  Outcome: Progressing Towards Goal  Goal: Diagnostic Test/Procedures  Outcome: Progressing Towards Goal  Goal: Nutrition/Diet  Outcome: Progressing Towards Goal  Goal: Discharge Planning  Outcome: Progressing Towards Goal  Goal: Medications  Outcome: Progressing Towards Goal  Goal: Respiratory  Outcome: Progressing Towards Goal  Goal: Treatments/Interventions/Procedures  Outcome: Progressing Towards Goal  Goal: Psychosocial  Outcome: Progressing Towards Goal     Problem: Sepsis: Day 6  Goal: Off Pathway (Use only if patient is Off Pathway)  Outcome: Progressing Towards Goal  Goal: *Oxygen saturation within defined limits  Outcome: Progressing Towards Goal  Goal: *Vital signs within defined limits  Outcome: Progressing Towards Goal  Goal: *Tolerating diet  Outcome: Progressing Towards Goal  Goal: *Demonstrates progressive activity  Outcome: Progressing Towards Goal  Goal: Influenza immunization  Outcome: Progressing Towards Goal  Goal: *Pneumococcal immunization  Outcome: Progressing Towards Goal  Goal: Activity/Safety  Outcome: Progressing Towards Goal  Goal: Diagnostic Test/Procedures  Outcome: Progressing Towards Goal  Goal: Nutrition/Diet  Outcome: Progressing Towards Goal  Goal: Discharge Planning  Outcome: Progressing Towards Goal  Goal: Medications  Outcome: Progressing Towards Goal  Goal: Respiratory  Outcome: Progressing Towards Goal  Goal: Treatments/Interventions/Procedures  Outcome: Progressing Towards Goal  Goal: Psychosocial  Outcome: Progressing Towards Goal     Problem: Sepsis: Discharge Outcomes  Goal: *Vital signs within defined limits  Outcome: Progressing Towards Goal  Goal: *Tolerating diet  Outcome: Progressing Towards Goal  Goal: *Verbalizes understanding and describes prescribed diet  Outcome: Progressing Towards Goal  Goal: *Demonstrates progressive activity  Outcome: Progressing Towards Goal  Goal: *Describes follow-up/return visits to physicians  Outcome: Progressing Towards Goal  Goal: *Verbalizes name, dosage, time, side effects, and number of days to continue medications  Outcome: Progressing Towards Goal  Goal: *Influenza immunization (Oct-Mar only)  Outcome: Progressing Towards Goal  Goal: *Pneumococcal immunization  Outcome: Progressing Towards Goal  Goal: *Lungs clear or at baseline  Outcome: Progressing Towards Goal  Goal: *Oxygen saturation returns to baseline or 90% or better on room air  Outcome: Progressing Towards Goal  Goal: *Glycemic control  Outcome: Progressing Towards Goal  Goal: *Absence of deep venous thrombosis signs and symptoms(Stroke Metric)  Outcome: Progressing Towards Goal  Goal: *Describes available resources and support systems  Outcome: Progressing Towards Goal  Goal: *Optimal pain control at patient's stated goal  Outcome: Progressing Towards Goal

## 2021-12-12 NOTE — PROGRESS NOTES
Hospitalist Progress Note               Daily Progress Note: 12/12/2021      Subjective: The patient is seen for follow up. He is a 26-year-old male with is of hypertension, diabetes and hypothyroidism who presented to the ED on 12/6 with left flank pain associated with fever and chills along with nausea and vomiting. CT showed a 5 mm obstructing stone in the distal left ureter with left perinephric inflammation. Patient did meet sepsis criteria. He was started on ceftriaxone. Patient was admitted to ICU. He underwent cystoscopy with left ureteral stenting on 12/6  -----  Patient seen for follow-up on 12/7. Feeling much better. Labs today show a creatinine of 2.99 up from 2.19 on admission    Troponin is 376, down from 873.   He was seen by cardiology who feels this represents a type II non-STEMI    Subjective-patient seen and evaluated at bedside, patient currently has no active/new complaints, discussed with RN at bedside    Problem List:  Problem List as of 12/12/2021 Never Reviewed          Codes Class Noted - Resolved    Pyelonephritis ICD-10-CM: N12  ICD-9-CM: 590.80  12/6/2021 - Present              Medications reviewed  Current Facility-Administered Medications   Medication Dose Route Frequency    nystatin (MYCOSTATIN) 100,000 unit/mL oral suspension 500,000 Units  500,000 Units Oral QID    0.9% sodium chloride infusion  50 mL/hr IntraVENous CONTINUOUS    heparin (porcine) injection 5,000 Units  5,000 Units SubCUTAneous Q8H    pantoprazole (PROTONIX) tablet 40 mg  40 mg Oral ACB    tamsulosin (FLOMAX) capsule 0.4 mg  0.4 mg Oral DAILY    atorvastatin (LIPITOR) tablet 80 mg  80 mg Oral DAILY    sodium chloride (NS) flush 5-40 mL  5-40 mL IntraVENous Q8H    sodium chloride (NS) flush 5-40 mL  5-40 mL IntraVENous PRN    acetaminophen (TYLENOL) tablet 650 mg  650 mg Oral Q6H PRN    Or    acetaminophen (TYLENOL) suppository 650 mg  650 mg Rectal Q6H PRN    polyethylene glycol (MIRALAX) packet 17 g  17 g Oral DAILY PRN    ondansetron (ZOFRAN ODT) tablet 4 mg  4 mg Oral Q8H PRN    Or    ondansetron (ZOFRAN) injection 4 mg  4 mg IntraVENous Q6H PRN    HYDROmorphone (DILAUDID) syringe 0.5 mg  0.5 mg IntraVENous Q4H PRN    sodium chloride (NS) flush 5-40 mL  5-40 mL IntraVENous Q8H    cefTRIAXone (ROCEPHIN) 2 g in 0.9% sodium chloride (MBP/ADV) 50 mL MBP  2 g IntraVENous ACB    glucose chewable tablet 16 g  4 Tablet Oral PRN    dextrose (D50W) injection syrg 12.5-25 g  25-50 mL IntraVENous PRN    glucagon (GLUCAGEN) injection 1 mg  1 mg IntraMUSCular PRN    insulin lispro (HUMALOG) injection   SubCUTAneous AC&HS    aspirin delayed-release tablet 81 mg  81 mg Oral DAILY       Review of Systems:   A comprehensive review of systems was negative except for that written in the HPI. Objective:   Physical Exam:     Visit Vitals  /81 (BP 1 Location: Left upper arm, BP Patient Position: At rest;Supine)   Pulse (!) 59   Temp 97.7 °F (36.5 °C)   Resp 20   Ht 6' 9\" (2.057 m)   Wt 107 kg (236 lb)   SpO2 95%   BMI 25.29 kg/m²    O2 Flow Rate (L/min): 3 l/min O2 Device: None (Room air)    Temp (24hrs), Av.6 °F (36.4 °C), Min:97.4 °F (36.3 °C), Max:98.1 °F (36.7 °C)    No intake/output data recorded. 12/10 1901 -  0700  In: -   Out: 6151 [JZFB]    General:   Awake and alert   Lungs:   Clear to auscultation bilaterally. Chest wall:  No tenderness or deformity. Heart:  Regular rate and rhythm, S1, S2 normal, no murmur, click, rub or gallop. Abdomen:   Soft, non-tender. Bowel sounds normal. No masses,  No organomegaly. Extremities: Extremities normal, atraumatic, no cyanosis or edema. Pulses: 2+ and symmetric all extremities. Skin: Skin color, texture, turgor normal. No rashes or lesions   Neurologic: CNII-XII intact.   No gross focal deficits         Data Review:       Recent Days:  Recent Labs     21  0917 21  0901 12/10/21  0823   WBC 12.5* 12.9* 12.9*   HGB 11.9* 12.5 13.2   HCT 35.8* 37.7 39.6    227 175     Recent Labs     12/11/21  0901 12/10/21  0823    136   K 4.2 4.3    107   CO2 22 23   * 128*   BUN 53* 58*   CREA 2.71* 3.21*   CA 8.3* 8.6   MG 2.2 2.7*     No results for input(s): PH, PCO2, PO2, HCO3, FIO2 in the last 72 hours. 24 Hour Results:  Recent Results (from the past 24 hour(s))   GLUCOSE, POC    Collection Time: 12/11/21 11:26 AM   Result Value Ref Range    Glucose (POC) 119 (H) 65 - 117 mg/dL    Performed by Chris Mosley    GLUCOSE, POC    Collection Time: 12/11/21  4:17 PM   Result Value Ref Range    Glucose (POC) 139 (H) 65 - 117 mg/dL    Performed by Talat Marsh    GLUCOSE, POC    Collection Time: 12/11/21  4:49 PM   Result Value Ref Range    Glucose (POC) 117 65 - 117 mg/dL    Performed by [address] [place] [address] Avenue, POC    Collection Time: 12/11/21  9:37 PM   Result Value Ref Range    Glucose (POC) 142 (H) 65 - 117 mg/dL    Performed by Reta Gilford    CBC W/O DIFF    Collection Time: 12/12/21  9:17 AM   Result Value Ref Range    WBC 12.5 (H) 4.1 - 11.1 K/uL    RBC 4.03 (L) 4.10 - 5.70 M/uL    HGB 11.9 (L) 12.1 - 17.0 g/dL    HCT 35.8 (L) 36.6 - 50.3 %    MCV 88.8 80.0 - 99.0 FL    MCH 29.5 26.0 - 34.0 PG    MCHC 33.2 30.0 - 36.5 g/dL    RDW 13.2 11.5 - 14.5 %    PLATELET 974 878 - 029 K/uL    MPV 12.0 8.9 - 12.9 FL    NRBC 0.0 0.0  WBC    ABSOLUTE NRBC 0.00 0.00 - 0.01 K/uL       XR ABD (KUB)   Final Result      XR CHEST PORT   Final Result   No acute findings. XR FLUOROSCOPY UNDER 60 MINUTES   Final Result      CT ABD PELV WO CONT   Final Result   1. 5 x 5 mm obstructing stone in the distal left ureter. Left perinephric   inflammation. Correlate to medical renal disease to include renal dysfunction   and pyelonephritis. 2. Nonobstructing bilateral nephrolithiasis. 3. Thickening versus nondistention of the proximal duodenum. Correlate for any   clinical symptoms.    4. Trace bilateral pleural effusions. 5. Hepatomegaly. 6. Other findings above.       XR CHEST PORT   Final Result           Assessment:  Complicated UTI/pyelonephritis    Sepsis due to above    Acute kidney injury, likely combined postobstructive and ATN from sepsis    Obstructing left ureter stone, status post cystoscopy with stenting    Type II non-STEMI    Diabetes mellitus type 2    Hypertension    Hypothyroidism          Plan:    Sepsis secondary to complicated urinary tract infection/pyelonephritis-of note patient presented with above-mentioned symptomatology consistent with complicated urinary tract infection/pyelonephritis, patient remains hemodynamically stable at this time, remains afebrile, of note patient status post cystoscopy as well as left ureteral stent placement  Follow-up blood cultures  Follow-up urine cultures  Continue maintenance IV fluids  Continue ceftriaxone for antibiotic coverage  Urology consult appreciated, continue to follow recommendations    Non-ST elevation MI-patient presented with above-mentioned symptomatology found have a significant elevated serum troponin, initially thought to be type II non-ST elevation MI, of note transthoracic echo reviewed by cardiology attending, patient does have evidence of wall motion abnormality  Continue aspirin once daily  Completed heparin GTT  Currently serum creatinine is downtrending, discussed with cardiology attending, plan is for cardiac catheterization on Monday  Cardiology consult appreciated, continue to follow recommendations    Hyperlipidemia-continue statin    Right-sided nephrolithiasis-of note patient evaluated by urology yesterday, no urgent need for stent placement at this time  Continue to follow urology recommendations    Oral thrush-because of dysphagia, patient was evaluated by speech and swallow  Continue nystatin  Continue to monitor patient's clinical status    Acute kidney injury-likely multifactorial, differentials include prerenal versus renal versus postrenal etiologies, currently serum creatinine is downtrending  Continue maintenance IV fluids  Continue to trend serum creatinine  Avoid nephrotoxic medications  Nephrology consult appreciated, continue to follow recommendations    Prophylaxis-Heparin Subcu  FEN-cardiac diet, maintenance IV fluids, replete potassium and magnesium  Full code, will clarify about surrogate decision maker  Disposition-pending clinical improvement/cardiac catheterization/cardiac clearance (cath scheduled on Monday)    Total non critical care time spent 35 mins    Elizabeth Cleveland MD

## 2021-12-12 NOTE — PROGRESS NOTES
Cardiology Progress Note      12/12/2021 10:38 AM    Admit Date: 12/6/2021    Admit Diagnosis: Pyelonephritis [N12]      Subjective:   Patient seen and examined. He remains chest pain-free. No overnight events.     Visit Vitals  BP (!) 157/87 (BP 1 Location: Left upper arm, BP Patient Position: At rest;Supine)   Pulse (!) 57   Temp 97.6 °F (36.4 °C)   Resp 18   Ht 6' 9\" (2.057 m)   Wt 107 kg (236 lb)   SpO2 95%   BMI 25.29 kg/m²     Current Facility-Administered Medications   Medication Dose Route Frequency    nystatin (MYCOSTATIN) 100,000 unit/mL oral suspension 500,000 Units  500,000 Units Oral QID    0.9% sodium chloride infusion  50 mL/hr IntraVENous CONTINUOUS    heparin (porcine) injection 5,000 Units  5,000 Units SubCUTAneous Q8H    pantoprazole (PROTONIX) tablet 40 mg  40 mg Oral ACB    tamsulosin (FLOMAX) capsule 0.4 mg  0.4 mg Oral DAILY    atorvastatin (LIPITOR) tablet 80 mg  80 mg Oral DAILY    sodium chloride (NS) flush 5-40 mL  5-40 mL IntraVENous Q8H    sodium chloride (NS) flush 5-40 mL  5-40 mL IntraVENous PRN    acetaminophen (TYLENOL) tablet 650 mg  650 mg Oral Q6H PRN    Or    acetaminophen (TYLENOL) suppository 650 mg  650 mg Rectal Q6H PRN    polyethylene glycol (MIRALAX) packet 17 g  17 g Oral DAILY PRN    ondansetron (ZOFRAN ODT) tablet 4 mg  4 mg Oral Q8H PRN    Or    ondansetron (ZOFRAN) injection 4 mg  4 mg IntraVENous Q6H PRN    HYDROmorphone (DILAUDID) syringe 0.5 mg  0.5 mg IntraVENous Q4H PRN    sodium chloride (NS) flush 5-40 mL  5-40 mL IntraVENous Q8H    cefTRIAXone (ROCEPHIN) 2 g in 0.9% sodium chloride (MBP/ADV) 50 mL MBP  2 g IntraVENous ACB    glucose chewable tablet 16 g  4 Tablet Oral PRN    dextrose (D50W) injection syrg 12.5-25 g  25-50 mL IntraVENous PRN    glucagon (GLUCAGEN) injection 1 mg  1 mg IntraMUSCular PRN    insulin lispro (HUMALOG) injection   SubCUTAneous AC&HS    aspirin delayed-release tablet 81 mg  81 mg Oral DAILY         Objective: Physical Exam:  Visit Vitals  BP (!) 157/87 (BP 1 Location: Left upper arm, BP Patient Position: At rest;Supine)   Pulse (!) 57   Temp 97.6 °F (36.4 °C)   Resp 18   Ht 6' 9\" (2.057 m)   Wt 107 kg (236 lb)   SpO2 95%   BMI 25.29 kg/m²     General Appearance:  Well developed, well nourished,alert and oriented x 3, and individual in no acute distress. Ears/Nose/Mouth/Throat:   Hearing grossly normal.         Neck: Supple. Chest:   Lungs clear to auscultation bilaterally. Cardiovascular:  Regular rate and rhythm, S1, S2 normal, no murmur. Abdomen:   Soft, non-tender, bowel sounds are active. Extremities: No edema bilaterally. Skin: Warm and dry.                Data Review:   Labs:    Recent Results (from the past 24 hour(s))   GLUCOSE, POC    Collection Time: 12/11/21  4:17 PM   Result Value Ref Range    Glucose (POC) 139 (H) 65 - 117 mg/dL    Performed by Susie Garnica    GLUCOSE, POC    Collection Time: 12/11/21  4:49 PM   Result Value Ref Range    Glucose (POC) 117 65 - 117 mg/dL    Performed by [address] [place] [address] Avenue, POC    Collection Time: 12/11/21  9:37 PM   Result Value Ref Range    Glucose (POC) 142 (H) 65 - 117 mg/dL    Performed by Ramana Moulton    CBC W/O DIFF    Collection Time: 12/12/21  9:17 AM   Result Value Ref Range    WBC 12.5 (H) 4.1 - 11.1 K/uL    RBC 4.03 (L) 4.10 - 5.70 M/uL    HGB 11.9 (L) 12.1 - 17.0 g/dL    HCT 35.8 (L) 36.6 - 50.3 %    MCV 88.8 80.0 - 99.0 FL    MCH 29.5 26.0 - 34.0 PG    MCHC 33.2 30.0 - 36.5 g/dL    RDW 13.2 11.5 - 14.5 %    PLATELET 543 117 - 852 K/uL    MPV 12.0 8.9 - 12.9 FL    NRBC 0.0 0.0  WBC    ABSOLUTE NRBC 0.00 0.00 - 3.77 K/uL   METABOLIC PANEL, BASIC    Collection Time: 12/12/21  9:17 AM   Result Value Ref Range    Sodium 138 136 - 145 mmol/L    Potassium 4.1 3.5 - 5.1 mmol/L    Chloride 107 97 - 108 mmol/L    CO2 24 21 - 32 mmol/L    Anion gap 7 5 - 15 mmol/L    Glucose 160 (H) 65 - 100 mg/dL    BUN 42 (H) 6 - 20 mg/dL    Creatinine 2.36 (H) 0.70 - 1.30 mg/dL    BUN/Creatinine ratio 18 12 - 20      GFR est AA 34 (L) >60 ml/min/1.73m2    GFR est non-AA 28 (L) >60 ml/min/1.73m2    Calcium 8.3 (L) 8.5 - 10.1 mg/dL   MAGNESIUM    Collection Time: 12/12/21  9:17 AM   Result Value Ref Range    Magnesium 2.1 1.6 - 2.4 mg/dL   GLUCOSE, POC    Collection Time: 12/12/21 10:55 AM   Result Value Ref Range    Glucose (POC) 139 (H) 65 - 117 mg/dL    Performed by Chance Johnston        Telemetry: normal sinus rhythm      Assessment:   NSTEMI type II  Pyelonephritis status post ureteral stent  Acute kidney injury  Diabetes mellitus  Hypertension  Hyperlipidemia    Plan:   78-year-old male with a past medical history as above who is seen for elevated troponins after undergoing ureteral stent placement.  -Patient has had no clear anginal symptoms but he has significantly elevated troponins which are now downtrending. I reviewed his transthoracic echocardiogram which shows mild LV dysfunction with lateral, anterolateral, inferolateral wall motion abnormality consistent with coronary artery disease in the left circumflex/right coronary artery territory  -Renal function is improving now. -Continue aspirin, high intensity statin. Add beta-blockers as hemodynamically able. Resting heart rate is in the 50s hence I am reluctant to start this yet  -Once his presenting problems have stabilized and renal status has been optimized, he will need cardiac catheterization for further evaluation. Anticipate this for Monday or when kidney function normalizes, possibly Tuesday. I explained this to the patient as well. He understands and agrees. His creatinine continues to improve and is 2.36 today, I do not anticipate this to normalize by tomorrow however I will keep him n.p.o. after midnight and have a slot for him in the cardiac catheterization lab schedule.   Patient is aware of this and that we may not be able to proceed with this procedure tomorrow  -We will continue to follow the patient and give further recommendations pending his clinical course.     Thank you for allowing us to participate in the care of your patient

## 2021-12-13 LAB
ANION GAP SERPL CALC-SCNC: 6 MMOL/L (ref 5–15)
BACTERIA SPEC CULT: NORMAL
BUN SERPL-MCNC: 33 MG/DL (ref 6–20)
BUN/CREAT SERPL: 15 (ref 12–20)
CA-I BLD-MCNC: 8.3 MG/DL (ref 8.5–10.1)
CHLORIDE SERPL-SCNC: 108 MMOL/L (ref 97–108)
CO2 SERPL-SCNC: 26 MMOL/L (ref 21–32)
CREAT SERPL-MCNC: 2.15 MG/DL (ref 0.7–1.3)
ERYTHROCYTE [DISTWIDTH] IN BLOOD BY AUTOMATED COUNT: 13.2 % (ref 11.5–14.5)
GLUCOSE BLD STRIP.AUTO-MCNC: 131 MG/DL (ref 65–117)
GLUCOSE BLD STRIP.AUTO-MCNC: 135 MG/DL (ref 65–117)
GLUCOSE BLD STRIP.AUTO-MCNC: 140 MG/DL (ref 65–117)
GLUCOSE BLD STRIP.AUTO-MCNC: 188 MG/DL (ref 65–117)
GLUCOSE SERPL-MCNC: 162 MG/DL (ref 65–100)
HCT VFR BLD AUTO: 35.9 % (ref 36.6–50.3)
HGB BLD-MCNC: 12 G/DL (ref 12.1–17)
MAGNESIUM SERPL-MCNC: 2 MG/DL (ref 1.6–2.4)
MCH RBC QN AUTO: 30.1 PG (ref 26–34)
MCHC RBC AUTO-ENTMCNC: 33.4 G/DL (ref 30–36.5)
MCV RBC AUTO: 90 FL (ref 80–99)
NRBC # BLD: 0 K/UL (ref 0–0.01)
NRBC BLD-RTO: 0 PER 100 WBC
PERFORMED BY, TECHID: ABNORMAL
PLATELET # BLD AUTO: 296 K/UL (ref 150–400)
PMV BLD AUTO: 11.4 FL (ref 8.9–12.9)
POTASSIUM SERPL-SCNC: 3.9 MMOL/L (ref 3.5–5.1)
RBC # BLD AUTO: 3.99 M/UL (ref 4.1–5.7)
SODIUM SERPL-SCNC: 140 MMOL/L (ref 136–145)
SPECIAL REQUESTS,SREQ: NORMAL
WBC # BLD AUTO: 12 K/UL (ref 4.1–11.1)

## 2021-12-13 PROCEDURE — 74011000258 HC RX REV CODE- 258: Performed by: INTERNAL MEDICINE

## 2021-12-13 PROCEDURE — 77010033678 HC OXYGEN DAILY

## 2021-12-13 PROCEDURE — 74011250637 HC RX REV CODE- 250/637: Performed by: INTERNAL MEDICINE

## 2021-12-13 PROCEDURE — 80048 BASIC METABOLIC PNL TOTAL CA: CPT

## 2021-12-13 PROCEDURE — 83735 ASSAY OF MAGNESIUM: CPT

## 2021-12-13 PROCEDURE — 74011636637 HC RX REV CODE- 636/637: Performed by: INTERNAL MEDICINE

## 2021-12-13 PROCEDURE — 36415 COLL VENOUS BLD VENIPUNCTURE: CPT

## 2021-12-13 PROCEDURE — 92526 ORAL FUNCTION THERAPY: CPT

## 2021-12-13 PROCEDURE — 74011250637 HC RX REV CODE- 250/637: Performed by: HOSPITALIST

## 2021-12-13 PROCEDURE — 74011250636 HC RX REV CODE- 250/636: Performed by: INTERNAL MEDICINE

## 2021-12-13 PROCEDURE — 65270000029 HC RM PRIVATE

## 2021-12-13 PROCEDURE — 82962 GLUCOSE BLOOD TEST: CPT

## 2021-12-13 PROCEDURE — 99232 SBSQ HOSP IP/OBS MODERATE 35: CPT | Performed by: UROLOGY

## 2021-12-13 PROCEDURE — 94760 N-INVAS EAR/PLS OXIMETRY 1: CPT

## 2021-12-13 PROCEDURE — 85027 COMPLETE CBC AUTOMATED: CPT

## 2021-12-13 RX ORDER — HYDROCHLOROTHIAZIDE 25 MG/1
25 TABLET ORAL DAILY
Status: DISCONTINUED | OUTPATIENT
Start: 2021-12-14 | End: 2021-12-21 | Stop reason: HOSPADM

## 2021-12-13 RX ORDER — ALLOPURINOL 300 MG/1
300 TABLET ORAL DAILY
Status: DISCONTINUED | OUTPATIENT
Start: 2021-12-14 | End: 2021-12-21 | Stop reason: HOSPADM

## 2021-12-13 RX ADMIN — Medication 10 ML: at 13:13

## 2021-12-13 RX ADMIN — ACETAMINOPHEN 650 MG: 325 TABLET ORAL at 09:59

## 2021-12-13 RX ADMIN — CEFTRIAXONE SODIUM 2 G: 2 INJECTION, POWDER, FOR SOLUTION INTRAMUSCULAR; INTRAVENOUS at 08:29

## 2021-12-13 RX ADMIN — HYDROMORPHONE HYDROCHLORIDE 0.5 MG: 1 INJECTION, SOLUTION INTRAMUSCULAR; INTRAVENOUS; SUBCUTANEOUS at 10:00

## 2021-12-13 RX ADMIN — ONDANSETRON 4 MG: 2 INJECTION INTRAMUSCULAR; INTRAVENOUS at 17:30

## 2021-12-13 RX ADMIN — HEPARIN SODIUM 5000 UNITS: 5000 INJECTION INTRAVENOUS; SUBCUTANEOUS at 05:29

## 2021-12-13 RX ADMIN — TAMSULOSIN HYDROCHLORIDE 0.4 MG: 0.4 CAPSULE ORAL at 08:28

## 2021-12-13 RX ADMIN — Medication 10 ML: at 21:41

## 2021-12-13 RX ADMIN — HYDROMORPHONE HYDROCHLORIDE 0.5 MG: 1 INJECTION, SOLUTION INTRAMUSCULAR; INTRAVENOUS; SUBCUTANEOUS at 17:30

## 2021-12-13 RX ADMIN — Medication 10 ML: at 05:29

## 2021-12-13 RX ADMIN — PANTOPRAZOLE SODIUM 40 MG: 40 TABLET, DELAYED RELEASE ORAL at 08:28

## 2021-12-13 RX ADMIN — NYSTATIN 500000 UNITS: 100000 SUSPENSION ORAL at 17:25

## 2021-12-13 RX ADMIN — ACETAMINOPHEN 650 MG: 325 TABLET ORAL at 17:30

## 2021-12-13 RX ADMIN — ATORVASTATIN CALCIUM 80 MG: 40 TABLET, FILM COATED ORAL at 08:28

## 2021-12-13 RX ADMIN — ONDANSETRON 4 MG: 2 INJECTION INTRAMUSCULAR; INTRAVENOUS at 10:00

## 2021-12-13 RX ADMIN — NYSTATIN 500000 UNITS: 100000 SUSPENSION ORAL at 21:40

## 2021-12-13 RX ADMIN — NYSTATIN 500000 UNITS: 100000 SUSPENSION ORAL at 09:23

## 2021-12-13 RX ADMIN — HEPARIN SODIUM 5000 UNITS: 5000 INJECTION INTRAVENOUS; SUBCUTANEOUS at 21:40

## 2021-12-13 RX ADMIN — NYSTATIN 500000 UNITS: 100000 SUSPENSION ORAL at 12:48

## 2021-12-13 RX ADMIN — INSULIN LISPRO 2 UNITS: 100 INJECTION, SOLUTION INTRAVENOUS; SUBCUTANEOUS at 17:25

## 2021-12-13 RX ADMIN — HEPARIN SODIUM 5000 UNITS: 5000 INJECTION INTRAVENOUS; SUBCUTANEOUS at 13:13

## 2021-12-13 RX ADMIN — ASPIRIN 81 MG: 81 TABLET, COATED ORAL at 08:28

## 2021-12-13 NOTE — PROGRESS NOTES
Problem: Dysphagia (Adult)  Goal: *Acute Goals and Plan of Care (Insert Text)  Description: Speech Therapy Goals  Initiated 12/10/2021  -Patient will tolerate easy to chew diet with thin liquids without signs/symptoms of aspiration given no cues within 7 day(s). [ ] Not met  [ x]  MET   [ ] Progressing  [ ] Gabriella Garg  -Patient will demonstrate understanding of swallow safety precautions and aspiration precautions, diet recs with no cues within 7 day(s). [ ] Not met  [x ]  MET   [ ] Progressing  [ ] Discontinue    Outcome: Resolved/Met   SPEECH LANGUAGE PATHOLOGY DYSPHAGIA TREATMENT  Patient: Sergio Ceja (62 y.o. male)  Date: 12/13/2021  Diagnosis: Pyelonephritis [N12]   <principal problem not specified>  Procedure(s) (LRB):  Cystoscopy, left retrograde pyelogram   ureteral stent placement, direct vision internal urethrotomy (Bilateral) 7 Days Post-Op  Precautions: aspiration      ASSESSMENT:  Pt seen for follow-up positioned upright in bed. Cardiac cath is pending improved renal function, not planned for this date and pt is not currently NPO per nsg. Pt reports pain with swallow is significantly improved with mild right sided posterior tongue pain persisting this date. Pt also denies coughing with PO intake. Pt was started on Nystatin after initial bedside sw evaluation. Nsg reports no s/s aspiration or dysphagia. Pt self-feeds trials of thin via straw and hard solids without difficulty. Oral phase WNL. Pharyngeal phase with mild delay, WNL once initiated. Pt tolerates all trials, including large sequential sips, without overt s/s aspiration. PLAN:  Recommendations and Planned Interventions: At this time pt's oropharyngeal sw function is WNL and pain with swallow has significantly improved. Pt is aware of recs to follow-up with MD if this pain does not resolve for further assessment. Pt is able to state back swallow safety precautions.  Recommend cont current easy to chew/thin diet with aspiration and GERD precautions. No further SLP intervention is indicated at this time. Discharge Recommendations: Follow-up with MD/ENT if pain with swallow does not completely resolve; however, significant improvement is noted at this time. No further SLP intervention indicated. SUBJECTIVE:   Patient alert, agreeable, pleasant. OBJECTIVE:     Cognitive and Communication Status:  Neurologic State: Alert  Orientation Level: Oriented X4    Pain:  0  After treatment:   Patient left in no apparent distress in bed, Call bell within reach and Nursing notified    COMMUNICATION/EDUCATION:   Patient was educated regarding his deficit(s) of dysphagia, swallow safety precautions, diet recs, recs to f/u if pain does not resolve. He demonstrated Good understanding as evidenced by verbal responsiveness, teachback. The patient's plan of care including recommendations were discussed with: Registered nurse.      Dwayne Lee M.S. CCC-SLP  Time Calculation: 8 mins

## 2021-12-13 NOTE — PROGRESS NOTES
Cardiology Progress Note      12/13/2021 10:38 AM    Admit Date: 12/6/2021    Admit Diagnosis: Pyelonephritis [N12]      Subjective:   Patient seen and examined. He remains chest pain-free. No overnight events.     Visit Vitals  BP (!) 170/92 (BP 1 Location: Left upper arm)   Pulse 63   Temp 97.9 °F (36.6 °C)   Resp 18   Ht 6' 9\" (2.057 m)   Wt 107 kg (236 lb)   SpO2 96%   BMI 25.29 kg/m²     Current Facility-Administered Medications   Medication Dose Route Frequency    nystatin (MYCOSTATIN) 100,000 unit/mL oral suspension 500,000 Units  500,000 Units Oral QID    0.9% sodium chloride infusion  50 mL/hr IntraVENous CONTINUOUS    heparin (porcine) injection 5,000 Units  5,000 Units SubCUTAneous Q8H    pantoprazole (PROTONIX) tablet 40 mg  40 mg Oral ACB    tamsulosin (FLOMAX) capsule 0.4 mg  0.4 mg Oral DAILY    atorvastatin (LIPITOR) tablet 80 mg  80 mg Oral DAILY    sodium chloride (NS) flush 5-40 mL  5-40 mL IntraVENous PRN    acetaminophen (TYLENOL) tablet 650 mg  650 mg Oral Q6H PRN    Or    acetaminophen (TYLENOL) suppository 650 mg  650 mg Rectal Q6H PRN    polyethylene glycol (MIRALAX) packet 17 g  17 g Oral DAILY PRN    ondansetron (ZOFRAN ODT) tablet 4 mg  4 mg Oral Q8H PRN    Or    ondansetron (ZOFRAN) injection 4 mg  4 mg IntraVENous Q6H PRN    HYDROmorphone (DILAUDID) syringe 0.5 mg  0.5 mg IntraVENous Q4H PRN    sodium chloride (NS) flush 5-40 mL  5-40 mL IntraVENous Q8H    cefTRIAXone (ROCEPHIN) 2 g in 0.9% sodium chloride (MBP/ADV) 50 mL MBP  2 g IntraVENous ACB    glucose chewable tablet 16 g  4 Tablet Oral PRN    dextrose (D50W) injection syrg 12.5-25 g  25-50 mL IntraVENous PRN    glucagon (GLUCAGEN) injection 1 mg  1 mg IntraMUSCular PRN    insulin lispro (HUMALOG) injection   SubCUTAneous AC&HS    aspirin delayed-release tablet 81 mg  81 mg Oral DAILY         Objective:      Physical Exam:  Visit Vitals  BP (!) 170/92 (BP 1 Location: Left upper arm)   Pulse 63   Temp 97.9 °F (36.6 °C)   Resp 18   Ht 6' 9\" (2.057 m)   Wt 107 kg (236 lb)   SpO2 96%   BMI 25.29 kg/m²     General Appearance:  Well developed, well nourished,alert and oriented x 3, and individual in no acute distress. Ears/Nose/Mouth/Throat:   Hearing grossly normal.         Neck: Supple. Chest:   Lungs clear to auscultation bilaterally. Cardiovascular:  Regular rate and rhythm, S1, S2 normal, no murmur. Abdomen:   Soft, non-tender, bowel sounds are active. Extremities: No edema bilaterally. Skin: Warm and dry.                Data Review:   Labs:    Recent Results (from the past 24 hour(s))   GLUCOSE, POC    Collection Time: 12/12/21 10:55 AM   Result Value Ref Range    Glucose (POC) 139 (H) 65 - 117 mg/dL    Performed by [address], POC    Collection Time: 12/12/21  3:37 PM   Result Value Ref Range    Glucose (POC) 148 (H) 65 - 117 mg/dL    Performed by [address], POC    Collection Time: 12/12/21  8:38 PM   Result Value Ref Range    Glucose (POC) 136 (H) 65 - 117 mg/dL    Performed by Lily Tello    CBC W/O DIFF    Collection Time: 12/13/21  7:40 AM   Result Value Ref Range    WBC 12.0 (H) 4.1 - 11.1 K/uL    RBC 3.99 (L) 4.10 - 5.70 M/uL    HGB 12.0 (L) 12.1 - 17.0 g/dL    HCT 35.9 (L) 36.6 - 50.3 %    MCV 90.0 80.0 - 99.0 FL    MCH 30.1 26.0 - 34.0 PG    MCHC 33.4 30.0 - 36.5 g/dL    RDW 13.2 11.5 - 14.5 %    PLATELET 653 653 - 838 K/uL    MPV 11.4 8.9 - 12.9 FL    NRBC 0.0 0.0  WBC    ABSOLUTE NRBC 0.00 0.00 - 3.63 K/uL   METABOLIC PANEL, BASIC    Collection Time: 12/13/21  7:40 AM   Result Value Ref Range    Sodium 140 136 - 145 mmol/L    Potassium 3.9 3.5 - 5.1 mmol/L    Chloride 108 97 - 108 mmol/L    CO2 26 21 - 32 mmol/L    Anion gap 6 5 - 15 mmol/L    Glucose 162 (H) 65 - 100 mg/dL    BUN 33 (H) 6 - 20 mg/dL    Creatinine 2.15 (H) 0.70 - 1.30 mg/dL    BUN/Creatinine ratio 15 12 - 20      GFR est AA 38 (L) >60 ml/min/1.73m2    GFR est non-AA 31 (L) >60 ml/min/1.73m2 Calcium 8.3 (L) 8.5 - 10.1 mg/dL   MAGNESIUM    Collection Time: 12/13/21  7:40 AM   Result Value Ref Range    Magnesium 2.0 1.6 - 2.4 mg/dL   GLUCOSE, POC    Collection Time: 12/13/21  8:48 AM   Result Value Ref Range    Glucose (POC) 135 (H) 65 - 117 mg/dL    Performed by Wesley Odom        Telemetry: normal sinus rhythm      Assessment:   NSTEMI type II  Pyelonephritis status post ureteral stent  Acute kidney injury  Diabetes mellitus  Hypertension  Hyperlipidemia    Plan:   58-year-old male with a past medical history as above who is seen for elevated troponins after undergoing ureteral stent placement.  -Patient has had no clear anginal symptoms but he has significantly elevated troponins which are now downtrending. I reviewed his transthoracic echocardiogram which shows mild LV dysfunction with lateral, anterolateral, inferolateral wall motion abnormality consistent with coronary artery disease in the left circumflex/right coronary artery territory  -Renal function is improving now. -Continue aspirin, high intensity statin. Add beta-blockers as hemodynamically able. Resting heart rate is in the 50s hence I am reluctant to start this yet  -Once his presenting problems have stabilized and renal status has been optimized, he will need cardiac catheterization for further evaluation. Anticipate this for Monday or when kidney function normalizes, possibly Tuesday. I explained this to the patient as well. He understands and agrees. His creatinine continues to improve and is 2.1 today,will plan for cardiac cath once his renal function has normalized  -We will continue to follow the patient and give further recommendations pending his clinical course.  Discussed with family at the bedside     Thank you for allowing us to participate in the care of your patient

## 2021-12-13 NOTE — PROGRESS NOTES
Subjective   Patient has not had a good appetite and was not eating well till yesterday. He states that he has not had a BM for one week and attribute it to his poor intake. He continue to complain of bilateral flank pain but notes that it has gotten better. Past Medical History:   Diagnosis Date    Diabetes ([facility] [address]      Gout      Hypertension      Hypothyroidism      Prostate cancer ([facility] [address]      Renal stones              Past Surgical History:   Procedure Laterality Date    HX PROSTATE SURGERY        HX UROLOGICAL   12/06/2021     Cystoscopy,    HX UROLOGICAL   12/06/2021      left retrograde pyelogram      HX UROLOGICAL   12/06/2021     ureteral stent placement    HX UROLOGICAL   12/06/2021     direct vision internal urethrotomy      History reviewed. No pertinent family history.    Social History           Tobacco Use    Smoking status: Never Smoker    Smokeless tobacco: Never Used   Substance Use Topics    Alcohol use: Not Currently                 Current Facility-Administered Medications   Medication Dose Route Frequency Provider Last Rate Last Admin    [START ON 12/14/2021] allopurinoL (ZYLOPRIM) tablet 300 mg  300 mg Oral DAILY Dennis Ro MD        [START ON 12/14/2021] hydroCHLOROthiazide (HYDRODIURIL) tablet 25 mg  25 mg Oral DAILY Dennis Ro MD        nystatin (MYCOSTATIN) 100,000 unit/mL oral suspension 500,000 Units  500,000 Units Oral QID Tressa Lobo MD   500,000 Units at 12/13/21 1248    0.9% sodium chloride infusion  50 mL/hr IntraVENous CONTINUOUS Skinny Pisano MD 50 mL/hr at 12/10/21 1709 50 mL/hr at 12/10/21 1709    heparin (porcine) injection 5,000 Units  5,000 Units SubCUTAneous Nereyda Cobb MD   5,000 Units at 12/13/21 1313    pantoprazole (PROTONIX) tablet 40 mg  40 mg Oral ACB Tressa Lobo MD   40 mg at 12/13/21 0828    tamsulosin (FLOMAX) capsule 0.4 mg  0.4 mg Oral DAILY Maria Isabel Pisano MD 0.4 mg at 12/13/21 7689    atorvastatin (LIPITOR) tablet 80 mg  80 mg Oral DAILY Moni Pierce MD   80 mg at 12/13/21 7775    sodium chloride (NS) flush 5-40 mL  5-40 mL IntraVENous PRN Paulino Lang MD        acetaminophen (TYLENOL) tablet 650 mg  650 mg Oral Q6H PRN Paulino Lang MD   650 mg at 12/13/21 0447     Or    acetaminophen (TYLENOL) suppository 650 mg  650 mg Rectal Q6H PRN Paulino Lang MD        polyethylene glycol [facility]) packet 17 g  17 g Oral DAILY PRN Paulino Lang MD        ondansetron [facility] ODT) tablet 4 mg  4 mg Oral Q8H PRN Paulino Lang MD   4 mg at 12/08/21 1804     Or    ondansetron (ZOFRAN) injection 4 mg  4 mg IntraVENous Q6H PRN Paulino Lang MD   4 mg at 12/13/21 1000    HYDROmorphone (DILAUDID) syringe 0.5 mg  0.5 mg IntraVENous Q4H PRN Paulino Lang MD   0.5 mg at 12/13/21 1000    sodium chloride (NS) flush 5-40 mL  5-40 mL IntraVENous Q8H Serafin Farmer MD   10 mL at 12/13/21 1313    cefTRIAXone (ROCEPHIN) 2 g in 0.9% sodium chloride (MBP/ADV) 50 mL MBP  2 g IntraVENous ACB Paulino Lang  mL/hr at 12/13/21 0829 2 g at 12/13/21 0829    glucose chewable tablet 16 g  4 Tablet Oral PRN Paulino Lang MD        dextrose (D50W) injection syrg 12.5-25 g  25-50 mL IntraVENous PRN Paulino Lang MD        glucagon [facility]) injection 1 mg  1 mg IntraMUSCular PRN Paulino Lang MD        insulin lispro (HUMALOG) injection   SubCUTAneous AC&HS Paulino Lang MD   2 Units at 12/12/21 1641    aspirin delayed-release tablet 81 mg  81 mg Oral DAILY Tim Neumann MD   81 mg at 12/13/21 7726         Review of Systems   Respiratory: Negative for shortness of breath and wheezing. Cardiovascular: Negative for chest pain and leg swelling. Gastrointestinal: Negative for abdominal pain, blood in stool and diarrhea. Genitourinary: Positive for flank pain (bilateral). Negative for urgency. Neurological: Negative for headaches. Psychiatric/Behavioral: Negative for agitation.    All other systems reviewed and are negative.        Objective      Vital signs for last 24 hours:  Visit Vitals  BP (!) 152/82   Pulse (!) 58   Temp 98.2 °F (36.8 °C)   Resp 14   Ht 6' 9\" (2.057 m)   Wt 107 kg (236 lb)   SpO2 94%   BMI 25.29 kg/m²              Recent Results         Recent Results (from the past 24 hour(s))   GLUCOSE, POC     Collection Time: 12/12/21  3:37 PM   Result Value Ref Range     Glucose (POC) 148 (H) 65 - 117 mg/dL     Performed by Lorena Pavon     GLUCOSE, POC     Collection Time: 12/12/21  8:38 PM   Result Value Ref Range     Glucose (POC) 136 (H) 65 - 117 mg/dL     Performed by Hawa Jackson     CBC W/O DIFF     Collection Time: 12/13/21  7:40 AM   Result Value Ref Range     WBC 12.0 (H) 4.1 - 11.1 K/uL     RBC 3.99 (L) 4.10 - 5.70 M/uL     HGB 12.0 (L) 12.1 - 17.0 g/dL     HCT 35.9 (L) 36.6 - 50.3 %     MCV 90.0 80.0 - 99.0 FL     MCH 30.1 26.0 - 34.0 PG     MCHC 33.4 30.0 - 36.5 g/dL     RDW 13.2 11.5 - 14.5 %     PLATELET 929 970 - 066 K/uL     MPV 11.4 8.9 - 12.9 FL     NRBC 0.0 0.0  WBC     ABSOLUTE NRBC 0.00 0.00 - 7.04 K/uL   METABOLIC PANEL, BASIC     Collection Time: 12/13/21  7:40 AM   Result Value Ref Range     Sodium 140 136 - 145 mmol/L     Potassium 3.9 3.5 - 5.1 mmol/L     Chloride 108 97 - 108 mmol/L     CO2 26 21 - 32 mmol/L     Anion gap 6 5 - 15 mmol/L     Glucose 162 (H) 65 - 100 mg/dL     BUN 33 (H) 6 - 20 mg/dL     Creatinine 2.15 (H) 0.70 - 1.30 mg/dL     BUN/Creatinine ratio 15 12 - 20       GFR est AA 38 (L) >60 ml/min/1.73m2     GFR est non-AA 31 (L) >60 ml/min/1.73m2     Calcium 8.3 (L) 8.5 - 10.1 mg/dL   MAGNESIUM     Collection Time: 12/13/21  7:40 AM   Result Value Ref Range     Magnesium 2.0 1.6 - 2.4 mg/dL   GLUCOSE, POC     Collection Time: 12/13/21  8:48 AM   Result Value Ref Range     Glucose (POC) 135 (H) 65 - 117 mg/dL     Performed by [address] [place] [id number], POC     Collection Time: 12/13/21 12:14 PM   Result Value Ref Range     Glucose (POC) 131 (H) 65 - 117 mg/dL     Performed by Nikolai Aparicio                 Intake/Output Summary (Last 24 hours) at 12/13/2021 1317  Last data filed at 12/13/2021 0756      Gross per 24 hour   Intake --   Output 600 ml   Net -600 ml      Current Shift: 12/13 0701 - 12/13 1900  In: -   Out: 600 [Urine:600]  Last 3 Shifts: 12/11 1901 - 12/13 0700  In: -   Out: 2100 [Urine:2100]  Physical Exam  Vitals and nursing note reviewed. Constitutional:       Appearance: Normal appearance. Cardiovascular:      Rate and Rhythm: Regular rhythm. Pulses: Normal pulses. Heart sounds: Normal heart sounds. Skin:     General: Skin is warm and dry. Neurological:      General: No focal deficit present. Mental Status: He is alert and oriented to person, place, and time.    Psychiatric:         Mood and Affect: Mood normal.         Behavior: Behavior normal.          Berry in place   Urine looks clear  Data Review:   Recent Results         Recent Results (from the past 24 hour(s))   GLUCOSE, POC     Collection Time: 12/12/21  3:37 PM   Result Value Ref Range     Glucose (POC) 148 (H) 65 - 117 mg/dL     Performed by Rolo Odom     GLUCOSE, POC     Collection Time: 12/12/21  8:38 PM   Result Value Ref Range     Glucose (POC) 136 (H) 65 - 117 mg/dL     Performed by Luana Andrews     CBC W/O DIFF     Collection Time: 12/13/21  7:40 AM   Result Value Ref Range     WBC 12.0 (H) 4.1 - 11.1 K/uL     RBC 3.99 (L) 4.10 - 5.70 M/uL     HGB 12.0 (L) 12.1 - 17.0 g/dL     HCT 35.9 (L) 36.6 - 50.3 %     MCV 90.0 80.0 - 99.0 FL     MCH 30.1 26.0 - 34.0 PG     MCHC 33.4 30.0 - 36.5 g/dL     RDW 13.2 11.5 - 14.5 %     PLATELET 722 981 - 616 K/uL     MPV 11.4 8.9 - 12.9 FL     NRBC 0.0 0.0  WBC     ABSOLUTE NRBC 0.00 0.00 - 9.29 K/uL   METABOLIC PANEL, BASIC     Collection Time: 12/13/21  7:40 AM   Result Value Ref Range     Sodium 140 136 - 145 mmol/L     Potassium 3.9 3.5 - 5.1 mmol/L     Chloride 108 97 - 108 mmol/L     CO2 26 21 - 32 mmol/L     Anion gap 6 5 - 15 mmol/L     Glucose 162 (H) 65 - 100 mg/dL     BUN 33 (H) 6 - 20 mg/dL     Creatinine 2.15 (H) 0.70 - 1.30 mg/dL     BUN/Creatinine ratio 15 12 - 20       GFR est AA 38 (L) >60 ml/min/1.73m2     GFR est non-AA 31 (L) >60 ml/min/1.73m2     Calcium 8.3 (L) 8.5 - 10.1 mg/dL   MAGNESIUM     Collection Time: 12/13/21  7:40 AM   Result Value Ref Range     Magnesium 2.0 1.6 - 2.4 mg/dL   GLUCOSE, POC     Collection Time: 12/13/21  8:48 AM   Result Value Ref Range     Glucose (POC) 135 (H) 65 - 117 mg/dL     Performed by Ulices Mireles     GLUCOSE, POC     Collection Time: 12/13/21 12:14 PM   Result Value Ref Range     Glucose (POC) 131 (H) 65 - 117 mg/dL     Performed by Ulices Mireles                 XR ABD (KUB)   Final Result       XR CHEST PORT   Final Result   No acute findings.       XR FLUOROSCOPY UNDER 60 MINUTES   Final Result       CT ABD PELV WO CONT   Final Result   1. 5 x 5 mm obstructing stone in the distal left ureter. Left perinephric   inflammation. Correlate to medical renal disease to include renal dysfunction   and pyelonephritis. 2. Nonobstructing bilateral nephrolithiasis. 3. Thickening versus nondistention of the proximal duodenum. Correlate for any   clinical symptoms. 4. Trace bilateral pleural effusions. 5. Hepatomegaly. 6. Other findings above.       XR CHEST PORT   Final Result                  Patient Active Problem List   Diagnosis Code    Pyelonephritis N12         DIAGNOSES:  1. FERMIN On CKD   2. FERMIN due to left hydronephrosis resolved after left ureteral stent placement , which is improving  3. Nephrolithiasis  4. Hypertension  5. Diabetes  6. Gout     DISCUSSION:  · Patient has multiple non-obstructing renal calcifications , Urinary Ph of 5 and elevated uric acid level.   · In order to address urinary pH and lower uric acid, will start allopurinol and hydrochlorothiazide  · Appreciate cardiology input and would like to defer cardiac catheterization until acute kidney injury is fully resolved     PLAN:  · Start hydrochlorothiazide 25 mg daily to decrease future risk of nephrolithiasis,  · Start Allopurinol to control urine uric acid levels. · Recommended oral hydration with 100 oz of water daily to prevent nephrolithiasis. · As per urology, remove pena catheter and have patient urinate on his own.                Thanks for consulting me. Please don't hesitate to contact me if any questions arise of if I can assist in any manner.     This dictation was done by dragon, computer voice recognition software.  Often unanticipated grammatical, syntax, phones and other interpretive errors are inadvertently transcribed.  Please excuse errors that have escaped final proofreading. Please contact me if you suspect dictation or transcription errors.   Dr Katie Gong  [address] [place] [address], [facility], [address] [facility]  Cell Phone: [phone]  Office phone: [phone]  Fax: [phone]

## 2021-12-13 NOTE — PROGRESS NOTES
Problem: Falls - Risk of  Goal: *Absence of Falls  Description: Document Adrianne Dias Fall Risk and appropriate interventions in the flowsheet.   Outcome: Progressing Towards Goal  Note: Fall Risk Interventions:            Medication Interventions: Patient to call before getting OOB, Teach patient to arise slowly    Elimination Interventions: Patient to call for help with toileting needs              Problem: Patient Education: Go to Patient Education Activity  Goal: Patient/Family Education  Outcome: Progressing Towards Goal     Problem: Patient Education: Go to Patient Education Activity  Goal: Patient/Family Education  Outcome: Progressing Towards Goal     Problem: Sepsis: Day of Diagnosis  Goal: Off Pathway (Use only if patient is Off Pathway)  Outcome: Progressing Towards Goal  Goal: *Fluid resuscitation  Outcome: Progressing Towards Goal  Goal: *Paired blood cultures prior to first dose of antibiotic  Outcome: Progressing Towards Goal  Goal: *First dose of  appropriate antibiotic within 3 hours of arrival to ED, within 1 hour of arrival to ICU  Outcome: Progressing Towards Goal  Goal: *Lactic acid with first set of blood cultures  Outcome: Progressing Towards Goal  Goal: *Pneumococcal immunization (if eligible)  Outcome: Progressing Towards Goal  Goal: *Influenza immunization (if eligible)  Outcome: Progressing Towards Goal  Goal: Activity/Safety  Outcome: Progressing Towards Goal  Goal: Consults, if ordered  Outcome: Progressing Towards Goal  Goal: Diagnostic Test/Procedures  Outcome: Progressing Towards Goal  Goal: Nutrition/Diet  Outcome: Progressing Towards Goal  Goal: Discharge Planning  Outcome: Progressing Towards Goal  Goal: Medications  Outcome: Progressing Towards Goal  Goal: Respiratory  Outcome: Progressing Towards Goal  Goal: Treatments/Interventions/Procedures  Outcome: Progressing Towards Goal  Goal: Psychosocial  Outcome: Progressing Towards Goal     Problem: Sepsis: Day 2  Goal: Off Pathway (Use only if patient is Off Pathway)  Outcome: Progressing Towards Goal  Goal: *Central Venous Pressure maintained at 8-12 mm Hg  Outcome: Progressing Towards Goal  Goal: *Hemodynamically stable  Outcome: Progressing Towards Goal  Goal: *Tolerating diet  Outcome: Progressing Towards Goal  Goal: Activity/Safety  Outcome: Progressing Towards Goal  Goal: Consults, if ordered  Outcome: Progressing Towards Goal  Goal: Diagnostic Test/Procedures  Outcome: Progressing Towards Goal  Goal: Nutrition/Diet  Outcome: Progressing Towards Goal  Goal: Discharge Planning  Outcome: Progressing Towards Goal  Goal: Medications  Outcome: Progressing Towards Goal  Goal: Respiratory  Outcome: Progressing Towards Goal  Goal: Treatments/Interventions/Procedures  Outcome: Progressing Towards Goal  Goal: Psychosocial  Outcome: Progressing Towards Goal     Problem: Sepsis: Day 3  Goal: Off Pathway (Use only if patient is Off Pathway)  Outcome: Progressing Towards Goal  Goal: *Central Venous Pressure maintained at 8-12 mm Hg  Outcome: Progressing Towards Goal  Goal: *Oxygen saturation within defined limits  Outcome: Progressing Towards Goal  Goal: *Vital sign stability  Outcome: Progressing Towards Goal  Goal: *Tolerating diet  Outcome: Progressing Towards Goal  Goal: *Demonstrates progressive activity  Outcome: Progressing Towards Goal  Goal: Activity/Safety  Outcome: Progressing Towards Goal  Goal: Consults, if ordered  Outcome: Progressing Towards Goal  Goal: Diagnostic Test/Procedures  Outcome: Progressing Towards Goal  Goal: Nutrition/Diet  Outcome: Progressing Towards Goal  Goal: Discharge Planning  Outcome: Progressing Towards Goal  Goal: Medications  Outcome: Progressing Towards Goal  Goal: Respiratory  Outcome: Progressing Towards Goal  Goal: Treatments/Interventions/Procedures  Outcome: Progressing Towards Goal  Goal: Psychosocial  Outcome: Progressing Towards Goal     Problem: Sepsis: Day 4  Goal: Off Pathway (Use only if patient is Off Pathway)  Outcome: Progressing Towards Goal  Goal: Activity/Safety  Outcome: Progressing Towards Goal  Goal: Consults, if ordered  Outcome: Progressing Towards Goal  Goal: Diagnostic Test/Procedures  Outcome: Progressing Towards Goal  Goal: Nutrition/Diet  Outcome: Progressing Towards Goal  Goal: Discharge Planning  Outcome: Progressing Towards Goal  Goal: Medications  Outcome: Progressing Towards Goal  Goal: Respiratory  Outcome: Progressing Towards Goal  Goal: Treatments/Interventions/Procedures  Outcome: Progressing Towards Goal  Goal: Psychosocial  Outcome: Progressing Towards Goal  Goal: *Oxygen saturation within defined limits  Outcome: Progressing Towards Goal  Goal: *Hemodynamically stable  Outcome: Progressing Towards Goal  Goal: *Vital signs within defined limits  Outcome: Progressing Towards Goal  Goal: *Tolerating diet  Outcome: Progressing Towards Goal  Goal: *Demonstrates progressive activity  Outcome: Progressing Towards Goal  Goal: *Fluid volume maintenance  Outcome: Progressing Towards Goal     Problem: Sepsis: Day 5  Goal: Off Pathway (Use only if patient is Off Pathway)  Outcome: Progressing Towards Goal  Goal: *Oxygen saturation within defined limits  Outcome: Progressing Towards Goal  Goal: *Vital signs within defined limits  Outcome: Progressing Towards Goal  Goal: *Tolerating diet  Outcome: Progressing Towards Goal  Goal: *Demonstrates progressive activity  Outcome: Progressing Towards Goal  Goal: *Discharge plan identified  Outcome: Progressing Towards Goal  Goal: Activity/Safety  Outcome: Progressing Towards Goal  Goal: Consults, if ordered  Outcome: Progressing Towards Goal  Goal: Diagnostic Test/Procedures  Outcome: Progressing Towards Goal  Goal: Nutrition/Diet  Outcome: Progressing Towards Goal  Goal: Discharge Planning  Outcome: Progressing Towards Goal  Goal: Medications  Outcome: Progressing Towards Goal  Goal: Respiratory  Outcome: Progressing Towards Goal  Goal: Treatments/Interventions/Procedures  Outcome: Progressing Towards Goal  Goal: Psychosocial  Outcome: Progressing Towards Goal     Problem: Sepsis: Day 6  Goal: Off Pathway (Use only if patient is Off Pathway)  Outcome: Progressing Towards Goal  Goal: *Oxygen saturation within defined limits  Outcome: Progressing Towards Goal  Goal: *Vital signs within defined limits  Outcome: Progressing Towards Goal  Goal: *Tolerating diet  Outcome: Progressing Towards Goal  Goal: *Demonstrates progressive activity  Outcome: Progressing Towards Goal  Goal: Influenza immunization  Outcome: Progressing Towards Goal  Goal: *Pneumococcal immunization  Outcome: Progressing Towards Goal  Goal: Activity/Safety  Outcome: Progressing Towards Goal  Goal: Diagnostic Test/Procedures  Outcome: Progressing Towards Goal  Goal: Nutrition/Diet  Outcome: Progressing Towards Goal  Goal: Discharge Planning  Outcome: Progressing Towards Goal  Goal: Medications  Outcome: Progressing Towards Goal  Goal: Respiratory  Outcome: Progressing Towards Goal  Goal: Treatments/Interventions/Procedures  Outcome: Progressing Towards Goal  Goal: Psychosocial  Outcome: Progressing Towards Goal     Problem: Sepsis: Discharge Outcomes  Goal: *Vital signs within defined limits  Outcome: Progressing Towards Goal  Goal: *Tolerating diet  Outcome: Progressing Towards Goal  Goal: *Verbalizes understanding and describes prescribed diet  Outcome: Progressing Towards Goal  Goal: *Demonstrates progressive activity  Outcome: Progressing Towards Goal  Goal: *Describes follow-up/return visits to physicians  Outcome: Progressing Towards Goal  Goal: *Verbalizes name, dosage, time, side effects, and number of days to continue medications  Outcome: Progressing Towards Goal  Goal: *Influenza immunization (Oct-Mar only)  Outcome: Progressing Towards Goal  Goal: *Pneumococcal immunization  Outcome: Progressing Towards Goal  Goal: *Lungs clear or at baseline  Outcome: Progressing Towards Goal  Goal: *Oxygen saturation returns to baseline or 90% or better on room air  Outcome: Progressing Towards Goal  Goal: *Glycemic control  Outcome: Progressing Towards Goal  Goal: *Absence of deep venous thrombosis signs and symptoms(Stroke Metric)  Outcome: Progressing Towards Goal  Goal: *Describes available resources and support systems  Outcome: Progressing Towards Goal  Goal: *Optimal pain control at patient's stated goal  Outcome: Progressing Towards Goal

## 2021-12-13 NOTE — MED STUDENT NOTES
Consult Date: 12/13/2021    Consults    Subjective   HISTORY OF PRESENTING ILLNESS   Patient has not had a good appetite and was not eating well till yesterday. He states that he has not had a BM for one week due to this. He continue to complain of bilateral flank pain but notes that it has gotten better. Past Medical History:   Diagnosis Date    Diabetes ([address]     Gout     Hypertension     Hypothyroidism     [facility] [address] [address]     Renal stones       Past Surgical History:   Procedure Laterality Date    HX PROSTATE SURGERY      HX UROLOGICAL  12/06/2021    Cystoscopy,    HX UROLOGICAL  12/06/2021     left retrograde pyelogram      HX UROLOGICAL  12/06/2021    ureteral stent placement    HX UROLOGICAL  12/06/2021    direct vision internal urethrotomy     History reviewed. No pertinent family history.    Social History     Tobacco Use    Smoking status: Never Smoker    Smokeless tobacco: Never Used   Substance Use Topics    Alcohol use: Not Currently       Current Facility-Administered Medications   Medication Dose Route Frequency Provider Last Rate Last Admin    [START ON 12/14/2021] allopurinoL (ZYLOPRIM) tablet 300 mg  300 mg Oral DAILY Glenda Ro MD        [START ON 12/14/2021] hydroCHLOROthiazide (HYDRODIURIL) tablet 25 mg  25 mg Oral DAILY Glenda Ro MD        nystatin (MYCOSTATIN) 100,000 unit/mL oral suspension 500,000 Units  500,000 Units Oral QID Karie Kenyon MD   500,000 Units at 12/13/21 1248    0.9% sodium chloride infusion  50 mL/hr IntraVENous CONTINUOUS Skinny Pisano MD 50 mL/hr at 12/10/21 1709 50 mL/hr at 12/10/21 1709    heparin (porcine) injection 5,000 Units  5,000 Units SubCUTAneous Agueda March MD   5,000 Units at 12/13/21 1313    pantoprazole (PROTONIX) tablet 40 mg  40 mg Oral ACB Karie Kenyon MD   40 mg at 12/13/21 0828    tamsulosin (FLOMAX) capsule 0.4 mg  0.4 mg Oral DAILY Skinny Pisano MD   0.4 mg at 12/13/21 0828    atorvastatin (LIPITOR) tablet 80 mg  80 mg Oral DAILY Kenji Stein MD   80 mg at 12/13/21 9918    sodium chloride (NS) flush 5-40 mL  5-40 mL IntraVENous PRN Lyndon Miranda MD        acetaminophen (TYLENOL) tablet 650 mg  650 mg Oral Q6H NATASHAN Lyndon Miranda MD   650 mg at 12/13/21 4672    Or    acetaminophen (TYLENOL) suppository 650 mg  650 mg Rectal Q6H PRN Lyndon Miranda MD        polyethylene glycol [facility]) packet 17 g  17 g Oral DAILY PRN Lyndon Miranda MD        ondansetron [facility] ODT) tablet 4 mg  4 mg Oral Q8H PRN Lyndon Miranda MD   4 mg at 12/08/21 1804    Or    ondansetron (ZOFRAN) injection 4 mg  4 mg IntraVENous Q6H EDILBERTO Miranda MD   4 mg at 12/13/21 1000    HYDROmorphone (DILAUDID) syringe 0.5 mg  0.5 mg IntraVENous Q4H PRPRESLEY Miranda MD   0.5 mg at 12/13/21 1000    sodium chloride (NS) flush 5-40 mL  5-40 mL IntraVENous Q8H Ana Laura Nash MD   10 mL at 12/13/21 1313    cefTRIAXone (ROCEPHIN) 2 g in 0.9% sodium chloride (MBP/ADV) 50 mL MBP  2 g IntraVENous ACB Lyndon Miranda  mL/hr at 12/13/21 0829 2 g at 12/13/21 0829    glucose chewable tablet 16 g  4 Tablet Oral PRN Lyndon Miranda MD        dextrose (D50W) injection syrg 12.5-25 g  25-50 mL IntraVENous NATASHAN Lyndon Miranda MD        glucagon [facility]) injection 1 mg  1 mg IntraMUSCular PRN Lyndon Miranda MD        insulin lispro (HUMALOG) injection   SubCUTAneous AC&HS Lyndon Miranda MD   2 Units at 12/12/21 1641    aspirin delayed-release tablet 81 mg  81 mg Oral DAILY Daily Emmanuel MD   81 mg at 12/13/21 2124        Review of Systems   Respiratory: Negative for shortness of breath and wheezing. Cardiovascular: Negative for chest pain and leg swelling. Gastrointestinal: Negative for abdominal pain, blood in stool and diarrhea. Genitourinary: Positive for flank pain (bilateral). Negative for urgency. Neurological: Negative for headaches. Psychiatric/Behavioral: Negative for agitation. All other systems reviewed and are negative. Objective     Vital signs for last 24 hours:  Visit Vitals  BP (!) 152/82   Pulse (!) 58   Temp 98.2 °F (36.8 °C)   Resp 14   Ht 6' 9\" (2.057 m)   Wt 107 kg (236 lb)   SpO2 94%   BMI 25.29 kg/m²         General-Well Developed, Well Nourished,   No Acute Distress,   Alert & Oriented x 3, appropriate affect  HEENT - atraumatic normocephalic  No pallor or icterus  Neck- supple, no JVD  CV- regular rate and rhythm   no MRG  Lung- clear bilaterally  Abd- soft, nontender, nondistended  Ext- no edema bilaterally.   Skin- warm and dry; rash  Access-  Urine apperance  Recent Results (from the past 24 hour(s))   GLUCOSE, POC    Collection Time: 12/12/21  3:37 PM   Result Value Ref Range    Glucose (POC) 148 (H) 65 - 117 mg/dL    Performed by Christo Malik, POC    Collection Time: 12/12/21  8:38 PM   Result Value Ref Range    Glucose (POC) 136 (H) 65 - 117 mg/dL    Performed by Cindy Daniel    CBC W/O DIFF    Collection Time: 12/13/21  7:40 AM   Result Value Ref Range    WBC 12.0 (H) 4.1 - 11.1 K/uL    RBC 3.99 (L) 4.10 - 5.70 M/uL    HGB 12.0 (L) 12.1 - 17.0 g/dL    HCT 35.9 (L) 36.6 - 50.3 %    MCV 90.0 80.0 - 99.0 FL    MCH 30.1 26.0 - 34.0 PG    MCHC 33.4 30.0 - 36.5 g/dL    RDW 13.2 11.5 - 14.5 %    PLATELET 602 365 - 802 K/uL    MPV 11.4 8.9 - 12.9 FL    NRBC 0.0 0.0  WBC    ABSOLUTE NRBC 0.00 0.00 - 5.62 K/uL   METABOLIC PANEL, BASIC    Collection Time: 12/13/21  7:40 AM   Result Value Ref Range    Sodium 140 136 - 145 mmol/L    Potassium 3.9 3.5 - 5.1 mmol/L    Chloride 108 97 - 108 mmol/L    CO2 26 21 - 32 mmol/L    Anion gap 6 5 - 15 mmol/L    Glucose 162 (H) 65 - 100 mg/dL    BUN 33 (H) 6 - 20 mg/dL    Creatinine 2.15 (H) 0.70 - 1.30 mg/dL    BUN/Creatinine ratio 15 12 - 20      GFR est AA 38 (L) >60 ml/min/1.73m2    GFR est non-AA 31 (L) >60 ml/min/1.73m2    Calcium 8.3 (L) 8.5 - 10.1 mg/dL MAGNESIUM    Collection Time: 12/13/21  7:40 AM   Result Value Ref Range    Magnesium 2.0 1.6 - 2.4 mg/dL   GLUCOSE, POC    Collection Time: 12/13/21  8:48 AM   Result Value Ref Range    Glucose (POC) 135 (H) 65 - 117 mg/dL    Performed by [address], POC    Collection Time: 12/13/21 12:14 PM   Result Value Ref Range    Glucose (POC) 131 (H) 65 - 117 mg/dL    Performed by Wesley Odom           Intake/Output Summary (Last 24 hours) at 12/13/2021 1317  Last data filed at 12/13/2021 0756  Gross per 24 hour   Intake --   Output 600 ml   Net -600 ml      Current Shift: 12/13 0701 - 12/13 1900  In: -   Out: 600 [Urine:600]  Last 3 Shifts: 12/11 1901 - 12/13 0700  In: -   Out: 2100 [Urine:2100]  Physical Exam  Vitals and nursing note reviewed. Constitutional:       Appearance: Normal appearance. Cardiovascular:      Rate and Rhythm: Regular rhythm. Pulses: Normal pulses. Heart sounds: Normal heart sounds. Skin:     General: Skin is warm and dry. Neurological:      General: No focal deficit present. Mental Status: He is alert and oriented to person, place, and time.    Psychiatric:         Mood and Affect: Mood normal.         Behavior: Behavior normal.          Data Review:   Recent Results (from the past 24 hour(s))   GLUCOSE, POC    Collection Time: 12/12/21  3:37 PM   Result Value Ref Range    Glucose (POC) 148 (H) 65 - 117 mg/dL    Performed by Christo Malik, POC    Collection Time: 12/12/21  8:38 PM   Result Value Ref Range    Glucose (POC) 136 (H) 65 - 117 mg/dL    Performed by Danae Morrison    CBC W/O DIFF    Collection Time: 12/13/21  7:40 AM   Result Value Ref Range    WBC 12.0 (H) 4.1 - 11.1 K/uL    RBC 3.99 (L) 4.10 - 5.70 M/uL    HGB 12.0 (L) 12.1 - 17.0 g/dL    HCT 35.9 (L) 36.6 - 50.3 %    MCV 90.0 80.0 - 99.0 FL    MCH 30.1 26.0 - 34.0 PG    MCHC 33.4 30.0 - 36.5 g/dL    RDW 13.2 11.5 - 14.5 %    PLATELET 448 952 - 950 K/uL    MPV 11.4 8.9 - 12.9 FL    NRBC 0.0 0.0  WBC    ABSOLUTE NRBC 0.00 0.00 - 3.74 K/uL   METABOLIC PANEL, BASIC    Collection Time: 12/13/21  7:40 AM   Result Value Ref Range    Sodium 140 136 - 145 mmol/L    Potassium 3.9 3.5 - 5.1 mmol/L    Chloride 108 97 - 108 mmol/L    CO2 26 21 - 32 mmol/L    Anion gap 6 5 - 15 mmol/L    Glucose 162 (H) 65 - 100 mg/dL    BUN 33 (H) 6 - 20 mg/dL    Creatinine 2.15 (H) 0.70 - 1.30 mg/dL    BUN/Creatinine ratio 15 12 - 20      GFR est AA 38 (L) >60 ml/min/1.73m2    GFR est non-AA 31 (L) >60 ml/min/1.73m2    Calcium 8.3 (L) 8.5 - 10.1 mg/dL   MAGNESIUM    Collection Time: 12/13/21  7:40 AM   Result Value Ref Range    Magnesium 2.0 1.6 - 2.4 mg/dL   GLUCOSE, POC    Collection Time: 12/13/21  8:48 AM   Result Value Ref Range    Glucose (POC) 135 (H) 65 - 117 mg/dL    Performed by [facility], POC    Collection Time: 12/13/21 12:14 PM   Result Value Ref Range    Glucose (POC) 131 (H) 65 - 117 mg/dL    Performed by Melody Turpin          XR ABD (KUB)   Final Result      XR CHEST PORT   Final Result   No acute findings. XR FLUOROSCOPY UNDER 60 MINUTES   Final Result      CT ABD PELV WO CONT   Final Result   1. 5 x 5 mm obstructing stone in the distal left ureter. Left perinephric   inflammation. Correlate to medical renal disease to include renal dysfunction   and pyelonephritis. 2. Nonobstructing bilateral nephrolithiasis. 3. Thickening versus nondistention of the proximal duodenum. Correlate for any   clinical symptoms. 4. Trace bilateral pleural effusions. 5. Hepatomegaly. 6. Other findings above. XR CHEST PORT   Final Result           Patient Active Problem List   Diagnosis Code    Pyelonephritis N12        DIAGNOSES:  1. Nephrolithiasis with left hydronephrosis s/p left ureteral stent placement   2. Hx of renal stones  3. Hypertension  4. Diabetes  5.  Gout    DISCUSSION:   Patient has multiple non-obstructing renal calcifications and need future risk management PLAN:   Start hydrochlorothiazide 25 mg daily to decrease future risk of nephrolithiasis,   Start Allopurinol to control urine uric acid levels.  Recommended oral hydration with 100 oz of water daily to prevent nephrolithiasis.  Remove pena catheter and have patient urinate on his own. Thanks for consulting me. Please don't hesitate to contact me if any questions arise of if I can assist in any manner. This dictation was done by dragon, computer voice recognition software. Often unanticipated grammatical, syntax, phones and other interpretive errors are inadvertently transcribed. Please excuse errors that have escaped final proofreading. Please contact me if you suspect dictation or transcription errors. Dr Gudelia Garza  [address], [address] [facility], [address] [facility]  Cell Phone: [phone]  Office phone: [phone]  Fax: [phone]   *ATTENTION:  This note has been created by a medical student for educational purposes only. Please do not refer to the content of this note for clinical decision-making, billing, or other purposes. Please see attending physicians note to obtain clinical information on this patient. *

## 2021-12-13 NOTE — PROGRESS NOTES
Chart reviewed, DCP remains for patient to DC to home self once medically stable. CM continues to monitor.

## 2021-12-13 NOTE — PROGRESS NOTES
Hospitalist Progress Note               Daily Progress Note: 12/13/2021      Subjective: The patient is seen for follow up. He is a 57-year-old male with is of hypertension, diabetes and hypothyroidism who presented to the ED on 12/6 with left flank pain associated with fever and chills along with nausea and vomiting. CT showed a 5 mm obstructing stone in the distal left ureter with left perinephric inflammation. Patient did meet sepsis criteria. He was started on ceftriaxone. Patient was admitted to ICU. He underwent cystoscopy with left ureteral stenting on 12/6  -----  Patient seen for follow-up on 12/7. Feeling much better. Labs today show a creatinine of 2.99 up from 2.19 on admission    Troponin is 376, down from 873.   He was seen by cardiology who feels this represents a type II non-STEMI    Subjective-patient seen and evaluated at bedside, patient currently has no active/new complaints, discussed with RN at bedside    Problem List:  Problem List as of 12/13/2021 Never Reviewed          Codes Class Noted - Resolved    Pyelonephritis ICD-10-CM: N12  ICD-9-CM: 590.80  12/6/2021 - Present              Medications reviewed  Current Facility-Administered Medications   Medication Dose Route Frequency    nystatin (MYCOSTATIN) 100,000 unit/mL oral suspension 500,000 Units  500,000 Units Oral QID    0.9% sodium chloride infusion  50 mL/hr IntraVENous CONTINUOUS    heparin (porcine) injection 5,000 Units  5,000 Units SubCUTAneous Q8H    pantoprazole (PROTONIX) tablet 40 mg  40 mg Oral ACB    tamsulosin (FLOMAX) capsule 0.4 mg  0.4 mg Oral DAILY    atorvastatin (LIPITOR) tablet 80 mg  80 mg Oral DAILY    sodium chloride (NS) flush 5-40 mL  5-40 mL IntraVENous PRN    acetaminophen (TYLENOL) tablet 650 mg  650 mg Oral Q6H PRN    Or    acetaminophen (TYLENOL) suppository 650 mg  650 mg Rectal Q6H PRN    polyethylene glycol (MIRALAX) packet 17 g  17 g Oral DAILY PRN    ondansetron (ZOFRAN ODT) tablet 4 mg  4 mg Oral Q8H PRN    Or    ondansetron (ZOFRAN) injection 4 mg  4 mg IntraVENous Q6H PRN    HYDROmorphone (DILAUDID) syringe 0.5 mg  0.5 mg IntraVENous Q4H PRN    sodium chloride (NS) flush 5-40 mL  5-40 mL IntraVENous Q8H    cefTRIAXone (ROCEPHIN) 2 g in 0.9% sodium chloride (MBP/ADV) 50 mL MBP  2 g IntraVENous ACB    glucose chewable tablet 16 g  4 Tablet Oral PRN    dextrose (D50W) injection syrg 12.5-25 g  25-50 mL IntraVENous PRN    glucagon (GLUCAGEN) injection 1 mg  1 mg IntraMUSCular PRN    insulin lispro (HUMALOG) injection   SubCUTAneous AC&HS    aspirin delayed-release tablet 81 mg  81 mg Oral DAILY       Review of Systems:   A comprehensive review of systems was negative except for that written in the HPI. Objective:   Physical Exam:     Visit Vitals  BP (!) 170/92 (BP 1 Location: Left upper arm)   Pulse 63   Temp 97.9 °F (36.6 °C)   Resp 18   Ht 6' 9\" (2.057 m)   Wt 107 kg (236 lb)   SpO2 96%   BMI 25.29 kg/m²    O2 Flow Rate (L/min): 3 l/min O2 Device: Nasal cannula    Temp (24hrs), Av °F (36.7 °C), Min:97.6 °F (36.4 °C), Max:98.5 °F (36.9 °C)    701 - 1900  In: -   Out: 600 [Urine:600]   1901 -  07  In: -   Out: 2100 [Urine:2100]    General:   Awake and alert   Lungs:   Clear to auscultation bilaterally. Chest wall:  No tenderness or deformity. Heart:  Regular rate and rhythm, S1, S2 normal, no murmur, click, rub or gallop. Abdomen:   Soft, non-tender. Bowel sounds normal. No masses,  No organomegaly. Extremities: Extremities normal, atraumatic, no cyanosis or edema. Pulses: 2+ and symmetric all extremities. Skin: Skin color, texture, turgor normal. No rashes or lesions   Neurologic: CNII-XII intact.   No gross focal deficits         Data Review:       Recent Days:  Recent Labs     21  0740 21  0917 21  0901   WBC 12.0* 12.5* 12.9*   HGB 12.0* 11.9* 12.5   HCT 35.9* 35.8* 37.7    251 227     Recent Labs 12/13/21  0740 12/12/21  0917 12/11/21  0901    138 138   K 3.9 4.1 4.2    107 108   CO2 26 24 22   * 160* 145*   BUN 33* 42* 53*   CREA 2.15* 2.36* 2.71*   CA 8.3* 8.3* 8.3*   MG 2.0 2.1 2.2     No results for input(s): PH, PCO2, PO2, HCO3, FIO2 in the last 72 hours.     24 Hour Results:  Recent Results (from the past 24 hour(s))   GLUCOSE, POC    Collection Time: 12/12/21 10:55 AM   Result Value Ref Range    Glucose (POC) 139 (H) 65 - 117 mg/dL    Performed by Christo 30, POC    Collection Time: 12/12/21  3:37 PM   Result Value Ref Range    Glucose (POC) 148 (H) 65 - 117 mg/dL    Performed by Christo 30, POC    Collection Time: 12/12/21  8:38 PM   Result Value Ref Range    Glucose (POC) 136 (H) 65 - 117 mg/dL    Performed by Blayne Cruz    CBC W/O DIFF    Collection Time: 12/13/21  7:40 AM   Result Value Ref Range    WBC 12.0 (H) 4.1 - 11.1 K/uL    RBC 3.99 (L) 4.10 - 5.70 M/uL    HGB 12.0 (L) 12.1 - 17.0 g/dL    HCT 35.9 (L) 36.6 - 50.3 %    MCV 90.0 80.0 - 99.0 FL    MCH 30.1 26.0 - 34.0 PG    MCHC 33.4 30.0 - 36.5 g/dL    RDW 13.2 11.5 - 14.5 %    PLATELET 519 333 - 362 K/uL    MPV 11.4 8.9 - 12.9 FL    NRBC 0.0 0.0  WBC    ABSOLUTE NRBC 0.00 0.00 - 7.09 K/uL   METABOLIC PANEL, BASIC    Collection Time: 12/13/21  7:40 AM   Result Value Ref Range    Sodium 140 136 - 145 mmol/L    Potassium 3.9 3.5 - 5.1 mmol/L    Chloride 108 97 - 108 mmol/L    CO2 26 21 - 32 mmol/L    Anion gap 6 5 - 15 mmol/L    Glucose 162 (H) 65 - 100 mg/dL    BUN 33 (H) 6 - 20 mg/dL    Creatinine 2.15 (H) 0.70 - 1.30 mg/dL    BUN/Creatinine ratio 15 12 - 20      GFR est AA 38 (L) >60 ml/min/1.73m2    GFR est non-AA 31 (L) >60 ml/min/1.73m2    Calcium 8.3 (L) 8.5 - 10.1 mg/dL   MAGNESIUM    Collection Time: 12/13/21  7:40 AM   Result Value Ref Range    Magnesium 2.0 1.6 - 2.4 mg/dL   GLUCOSE, POC    Collection Time: 12/13/21  8:48 AM   Result Value Ref Range    Glucose (POC) 135 (H) 65 - 117 mg/dL    Performed by Unknown [place]        XR ABD (KUB)   Final Result      XR CHEST PORT   Final Result   No acute findings. XR FLUOROSCOPY UNDER 60 MINUTES   Final Result      CT ABD PELV WO CONT   Final Result   1. 5 x 5 mm obstructing stone in the distal left ureter. Left perinephric   inflammation. Correlate to medical renal disease to include renal dysfunction   and pyelonephritis. 2. Nonobstructing bilateral nephrolithiasis. 3. Thickening versus nondistention of the proximal duodenum. Correlate for any   clinical symptoms. 4. Trace bilateral pleural effusions. 5. Hepatomegaly. 6. Other findings above.       XR CHEST PORT   Final Result           Assessment:  Complicated UTI/pyelonephritis    Sepsis due to above    Acute kidney injury, likely combined postobstructive and ATN from sepsis    Obstructing left ureter stone, status post cystoscopy with stenting    Type II non-STEMI    Diabetes mellitus type 2    Hypertension    Hypothyroidism          Plan:    Sepsis secondary to complicated urinary tract infection/pyelonephritis-of note patient presented with above-mentioned symptomatology consistent with complicated urinary tract infection/pyelonephritis, patient remains hemodynamically stable at this time, remains afebrile, of note patient status post cystoscopy as well as left ureteral stent placement  Follow-up blood cultures  Follow-up urine cultures  Continue maintenance IV fluids  Continue ceftriaxone for antibiotic coverage, currently day 7 (would overall need 14 days of Abx, can be switched to PO once patient stable for discharge)  Urology consult appreciated, continue to follow recommendations    Non-ST elevation MI-patient presented with above-mentioned symptomatology found have a significant elevated serum troponin, initially thought to be type II non-ST elevation MI, of note transthoracic echo reviewed by cardiology attending, patient does have evidence of wall motion abnormality  Continue aspirin once daily  Completed heparin GTT  Currently serum creatinine is downtrending, discussed with cardiology attending, plan is for cardiac catheterization on Monday  Cardiology consult appreciated, continue to follow recommendations    Hyperlipidemia-continue statin    Right-sided nephrolithiasis-of note patient evaluated by urology yesterday, no urgent need for stent placement at this time  Continue to follow urology recommendations    Oral thrush-because of dysphagia, patient was evaluated by speech and swallow  Continue nystatin  Continue to monitor patient's clinical status    Acute kidney injury-likely multifactorial, differentials include prerenal versus renal versus postrenal etiologies, currently serum creatinine is downtrending  Continue maintenance IV fluids  Continue to trend serum creatinine  Avoid nephrotoxic medications  Nephrology consult appreciated, continue to follow recommendations    Prophylaxis-Heparin Subcu  FEN-cardiac diet, maintenance IV fluids, replete potassium and magnesium  Full code, will clarify about surrogate decision maker  Disposition-pending clinical improvement/cardiac catheterization/cardiac clearance once creatinine has normalized, 24-48 hours    Total non critical care time spent 35 mins    Darlene Ocasio MD

## 2021-12-13 NOTE — PROGRESS NOTES
UROLOGY Progress Note          [phone]      Daily Progress Note: 12/13/2021      Subjective: The patient is seen for UROLOGIC follow  up. S/p left ureteral stent for sepsis 12/7/21 for pyelonephritis/ sepsis and 5mm distal stone, radioopaque    He also has a history of prostate cancer status post prostatectomy 11 years ago in [facility] he states he is remission. NSTEMI and FERMIN this admission. His FERMIN is improving. The patient states that he will need a heart evaluation or procedure.     Problem List:  Patient Active Problem List   Diagnosis Code    Pyelonephritis N12         Medications reviewed  Current Facility-Administered Medications   Medication Dose Route Frequency    nystatin (MYCOSTATIN) 100,000 unit/mL oral suspension 500,000 Units  500,000 Units Oral QID    0.9% sodium chloride infusion  50 mL/hr IntraVENous CONTINUOUS    heparin (porcine) injection 5,000 Units  5,000 Units SubCUTAneous Q8H    pantoprazole (PROTONIX) tablet 40 mg  40 mg Oral ACB    tamsulosin (FLOMAX) capsule 0.4 mg  0.4 mg Oral DAILY    atorvastatin (LIPITOR) tablet 80 mg  80 mg Oral DAILY    sodium chloride (NS) flush 5-40 mL  5-40 mL IntraVENous PRN    acetaminophen (TYLENOL) tablet 650 mg  650 mg Oral Q6H PRN    Or    acetaminophen (TYLENOL) suppository 650 mg  650 mg Rectal Q6H PRN    polyethylene glycol (MIRALAX) packet 17 g  17 g Oral DAILY PRN    ondansetron (ZOFRAN ODT) tablet 4 mg  4 mg Oral Q8H PRN    Or    ondansetron (ZOFRAN) injection 4 mg  4 mg IntraVENous Q6H PRN    HYDROmorphone (DILAUDID) syringe 0.5 mg  0.5 mg IntraVENous Q4H PRN    sodium chloride (NS) flush 5-40 mL  5-40 mL IntraVENous Q8H    cefTRIAXone (ROCEPHIN) 2 g in 0.9% sodium chloride (MBP/ADV) 50 mL MBP  2 g IntraVENous ACB    glucose chewable tablet 16 g  4 Tablet Oral PRN    dextrose (D50W) injection syrg 12.5-25 g  25-50 mL IntraVENous PRN    glucagon (GLUCAGEN) injection 1 mg  1 mg IntraMUSCular PRN    insulin lispro (HUMALOG) injection   SubCUTAneous AC&HS    aspirin delayed-release tablet 81 mg  81 mg Oral DAILY       Review of Systems:   Review of Systems   All other systems reviewed and are negative. Objective:   Physical Exam  Constitutional:       Appearance: Normal appearance. HENT:      Head: Normocephalic and atraumatic. Eyes:      Extraocular Movements: Extraocular movements intact. Cardiovascular:      Rate and Rhythm: Normal rate and regular rhythm. Pulmonary:      Effort: Pulmonary effort is normal. No respiratory distress. Breath sounds: No stridor. No wheezing or rhonchi. Abdominal:      General: There is no distension. Palpations: Abdomen is soft. Tenderness: There is no abdominal tenderness. Genitourinary:     Comments: Berry with yellow urine, cloudy sediment  Musculoskeletal:      Cervical back: Normal range of motion. No rigidity. Skin:     General: Skin is warm and dry. Neurological:      General: No focal deficit present. Mental Status: He is alert and oriented to person, place, and time. Psychiatric:         Mood and Affect: Mood normal.         Behavior: Behavior normal.          Visit Vitals  BP (!) 170/92 (BP 1 Location: Left upper arm)   Pulse 63   Temp 97.9 °F (36.6 °C)   Resp 18   Ht 6' 9\" (2.057 m)   Wt 236 lb (107 kg)   SpO2 96%   BMI 25.29 kg/m²         Data Review:       Recent Days:  Recent Labs     12/13/21  0740 12/12/21  0917 12/11/21  0901   WBC 12.0* 12.5* 12.9*   HGB 12.0* 11.9* 12.5   HCT 35.9* 35.8* 37.7    251 227     Recent Labs     12/13/21  0740 12/12/21  0917 12/11/21  0901    138 138   K 3.9 4.1 4.2    107 108   CO2 26 24 22   * 160* 145*   BUN 33* 42* 53*   CREA 2.15* 2.36* 2.71*   CA 8.3* 8.3* 8.3*   MG 2.0 2.1 2.2               Assessment/     Patient Active Problem List   Diagnosis Code    Pyelonephritis N12   Pyelonephritis/ sepsis - on ceftriaxone, Urine culture with no growth.  Pt may have had abx prior to specimen collection. FERMIN Cr down to 2.15 from peak of 3.47. Ok to d/c pena from  standpoint. Kidney stone 5mm distal stone. Can treat electively. NSTEMI. Cardiology following. Pt may need stone treatment/ anesthesia in the future. Cardiac risk a consideration.      Follow-up with urology as an outpatient after discharge      Paula Carrion MD

## 2021-12-14 LAB
ANION GAP SERPL CALC-SCNC: 4 MMOL/L (ref 5–15)
BUN SERPL-MCNC: 25 MG/DL (ref 6–20)
BUN/CREAT SERPL: 12 (ref 12–20)
CA-I BLD-MCNC: 8.4 MG/DL (ref 8.5–10.1)
CHLORIDE SERPL-SCNC: 107 MMOL/L (ref 97–108)
CO2 SERPL-SCNC: 27 MMOL/L (ref 21–32)
CREAT SERPL-MCNC: 2.05 MG/DL (ref 0.7–1.3)
ERYTHROCYTE [DISTWIDTH] IN BLOOD BY AUTOMATED COUNT: 13.3 % (ref 11.5–14.5)
GLUCOSE BLD STRIP.AUTO-MCNC: 112 MG/DL (ref 65–117)
GLUCOSE BLD STRIP.AUTO-MCNC: 121 MG/DL (ref 65–117)
GLUCOSE BLD STRIP.AUTO-MCNC: 140 MG/DL (ref 65–117)
GLUCOSE BLD STRIP.AUTO-MCNC: 169 MG/DL (ref 65–117)
GLUCOSE SERPL-MCNC: 131 MG/DL (ref 65–100)
HCT VFR BLD AUTO: 35.7 % (ref 36.6–50.3)
HGB BLD-MCNC: 11.8 G/DL (ref 12.1–17)
MAGNESIUM SERPL-MCNC: 1.7 MG/DL (ref 1.6–2.4)
MCH RBC QN AUTO: 29.7 PG (ref 26–34)
MCHC RBC AUTO-ENTMCNC: 33.1 G/DL (ref 30–36.5)
MCV RBC AUTO: 89.9 FL (ref 80–99)
NRBC # BLD: 0 K/UL (ref 0–0.01)
NRBC BLD-RTO: 0 PER 100 WBC
PERFORMED BY, TECHID: ABNORMAL
PERFORMED BY, TECHID: NORMAL
PLATELET # BLD AUTO: 342 K/UL (ref 150–400)
PMV BLD AUTO: 11.2 FL (ref 8.9–12.9)
POTASSIUM SERPL-SCNC: 3.8 MMOL/L (ref 3.5–5.1)
RBC # BLD AUTO: 3.97 M/UL (ref 4.1–5.7)
SODIUM SERPL-SCNC: 138 MMOL/L (ref 136–145)
WBC # BLD AUTO: 13.7 K/UL (ref 4.1–11.1)

## 2021-12-14 PROCEDURE — 74011000258 HC RX REV CODE- 258: Performed by: INTERNAL MEDICINE

## 2021-12-14 PROCEDURE — 82962 GLUCOSE BLOOD TEST: CPT

## 2021-12-14 PROCEDURE — 77010033678 HC OXYGEN DAILY

## 2021-12-14 PROCEDURE — 80048 BASIC METABOLIC PNL TOTAL CA: CPT

## 2021-12-14 PROCEDURE — 74011250637 HC RX REV CODE- 250/637: Performed by: INTERNAL MEDICINE

## 2021-12-14 PROCEDURE — 74011250636 HC RX REV CODE- 250/636: Performed by: INTERNAL MEDICINE

## 2021-12-14 PROCEDURE — 74011636637 HC RX REV CODE- 636/637: Performed by: INTERNAL MEDICINE

## 2021-12-14 PROCEDURE — 65270000029 HC RM PRIVATE

## 2021-12-14 PROCEDURE — 36415 COLL VENOUS BLD VENIPUNCTURE: CPT

## 2021-12-14 PROCEDURE — 85027 COMPLETE CBC AUTOMATED: CPT

## 2021-12-14 PROCEDURE — 74011250637 HC RX REV CODE- 250/637: Performed by: HOSPITALIST

## 2021-12-14 PROCEDURE — 83735 ASSAY OF MAGNESIUM: CPT

## 2021-12-14 RX ADMIN — ASPIRIN 81 MG: 81 TABLET, COATED ORAL at 08:17

## 2021-12-14 RX ADMIN — NYSTATIN 500000 UNITS: 100000 SUSPENSION ORAL at 17:20

## 2021-12-14 RX ADMIN — HEPARIN SODIUM 5000 UNITS: 5000 INJECTION INTRAVENOUS; SUBCUTANEOUS at 13:07

## 2021-12-14 RX ADMIN — ATORVASTATIN CALCIUM 80 MG: 40 TABLET, FILM COATED ORAL at 08:17

## 2021-12-14 RX ADMIN — HYDROMORPHONE HYDROCHLORIDE 0.5 MG: 1 INJECTION, SOLUTION INTRAMUSCULAR; INTRAVENOUS; SUBCUTANEOUS at 07:37

## 2021-12-14 RX ADMIN — Medication 10 ML: at 05:30

## 2021-12-14 RX ADMIN — INSULIN LISPRO 2 UNITS: 100 INJECTION, SOLUTION INTRAVENOUS; SUBCUTANEOUS at 13:07

## 2021-12-14 RX ADMIN — NYSTATIN 500000 UNITS: 100000 SUSPENSION ORAL at 08:17

## 2021-12-14 RX ADMIN — TAMSULOSIN HYDROCHLORIDE 0.4 MG: 0.4 CAPSULE ORAL at 08:17

## 2021-12-14 RX ADMIN — CEFTRIAXONE SODIUM 2 G: 2 INJECTION, POWDER, FOR SOLUTION INTRAMUSCULAR; INTRAVENOUS at 07:44

## 2021-12-14 RX ADMIN — ONDANSETRON 4 MG: 4 TABLET, ORALLY DISINTEGRATING ORAL at 07:44

## 2021-12-14 RX ADMIN — PANTOPRAZOLE SODIUM 40 MG: 40 TABLET, DELAYED RELEASE ORAL at 07:44

## 2021-12-14 RX ADMIN — Medication 10 ML: at 21:54

## 2021-12-14 RX ADMIN — HEPARIN SODIUM 5000 UNITS: 5000 INJECTION INTRAVENOUS; SUBCUTANEOUS at 21:53

## 2021-12-14 RX ADMIN — HEPARIN SODIUM 5000 UNITS: 5000 INJECTION INTRAVENOUS; SUBCUTANEOUS at 05:28

## 2021-12-14 RX ADMIN — ALLOPURINOL 300 MG: 300 TABLET ORAL at 08:17

## 2021-12-14 RX ADMIN — Medication 10 ML: at 13:07

## 2021-12-14 RX ADMIN — NYSTATIN 500000 UNITS: 100000 SUSPENSION ORAL at 21:53

## 2021-12-14 RX ADMIN — HYDROCHLOROTHIAZIDE 25 MG: 25 TABLET ORAL at 08:17

## 2021-12-14 RX ADMIN — NYSTATIN 500000 UNITS: 100000 SUSPENSION ORAL at 13:07

## 2021-12-14 NOTE — PROGRESS NOTES
Hospitalist Progress Note    Subjective:   Daily Progress Note: 12/14/2021 2:13 PM    Hospital Course:  58-year-old male with history of hypertension, diabetes and hypothyroidism presented to ED on 12/6 with left flank pain associated with fever/chills along with nausea/vomiting. CT showed 5 mm obstructing stone in the distal left ureter with left perinephric inflammation. Patient was started on ceftriaxone for sepsis, admitted to ICU, underwent cystoscopy with left ureteral stent on 12/6. Patient was also however found to have elevated troponin, cardiology on board, who feels this represent type II NSTEMI. Plan is cardiac catheterization once kidney function improves.     Subjective:    Current Facility-Administered Medications   Medication Dose Route Frequency    allopurinoL (ZYLOPRIM) tablet 300 mg  300 mg Oral DAILY    hydroCHLOROthiazide (HYDRODIURIL) tablet 25 mg  25 mg Oral DAILY    nystatin (MYCOSTATIN) 100,000 unit/mL oral suspension 500,000 Units  500,000 Units Oral QID    0.9% sodium chloride infusion  50 mL/hr IntraVENous CONTINUOUS    heparin (porcine) injection 5,000 Units  5,000 Units SubCUTAneous Q8H    pantoprazole (PROTONIX) tablet 40 mg  40 mg Oral ACB    tamsulosin (FLOMAX) capsule 0.4 mg  0.4 mg Oral DAILY    atorvastatin (LIPITOR) tablet 80 mg  80 mg Oral DAILY    sodium chloride (NS) flush 5-40 mL  5-40 mL IntraVENous PRN    acetaminophen (TYLENOL) tablet 650 mg  650 mg Oral Q6H PRN    Or    acetaminophen (TYLENOL) suppository 650 mg  650 mg Rectal Q6H PRN    polyethylene glycol (MIRALAX) packet 17 g  17 g Oral DAILY PRN    ondansetron (ZOFRAN ODT) tablet 4 mg  4 mg Oral Q8H PRN    Or    ondansetron (ZOFRAN) injection 4 mg  4 mg IntraVENous Q6H PRN    HYDROmorphone (DILAUDID) syringe 0.5 mg  0.5 mg IntraVENous Q4H PRN    sodium chloride (NS) flush 5-40 mL  5-40 mL IntraVENous Q8H    cefTRIAXone (ROCEPHIN) 2 g in 0.9% sodium chloride (MBP/ADV) 50 mL MBP  2 g IntraVENous ACB  glucose chewable tablet 16 g  4 Tablet Oral PRN    dextrose (D50W) injection syrg 12.5-25 g  25-50 mL IntraVENous PRN    glucagon (GLUCAGEN) injection 1 mg  1 mg IntraMUSCular PRN    insulin lispro (HUMALOG) injection   SubCUTAneous AC&HS    aspirin delayed-release tablet 81 mg  81 mg Oral DAILY        Review of Systems  Constitutional: No fevers, No chills, No sweats, No fatigue, No Weakness  Eyes: No redness  Ears, nose, mouth, throat, and face: No nasal congestion, No sore throat, No voice change  Respiratory: No Shortness of Breath, No cough, No wheezing  Cardiovascular: No chest pain, No palpitations, No extremity edema  Gastrointestinal: No nausea, No vomiting, No diarrhea, No abdominal pain  Genitourinary: No frequency, No dysuria, No hematuria  Integument/breast: No skin lesion(s)   Neurological: No Confusion, No headaches, No dizziness      Objective:     Visit Vitals  BP (!) 141/87   Pulse (!) 57   Temp 98.2 °F (36.8 °C)   Resp 14   Ht 6' 9\" (2.057 m)   Wt 107 kg (236 lb)   SpO2 95%   BMI 25.29 kg/m²    O2 Flow Rate (L/min): 3 l/min O2 Device: Nasal cannula    Temp (24hrs), Av °F (36.7 °C), Min:97.8 °F (36.6 °C), Max:98.2 °F (36.8 °C)       0701 -  1900  In: -   Out: 5496 [NIWFQ:3379]   190 -  0700  In: -   Out: 2150 [Urine:2150]    PHYSICAL EXAM:  Constitutional: No acute distress  Skin: Extremities and face reveal no rashes. HEENT: Sclerae anicteric. Extra-occular muscles are intact. No oral ulcers. The neck is supple and no masses. Cardiovascular: Regular rate and rhythm. Respiratory:  Clear breath sounds bilaterally with no wheezes, rales, or rhonchi. GI: Abdomen nondistended, soft, and nontender. Normal active bowel sounds. Musculoskeletal: No pitting edema of the lower legs. Able to move all ext  Neurological:  Patient is alert and oriented.  Cranial nerves II-XII grossly intact  Psychiatric: Mood appears appropriate       Data Review    Recent Results (from the past 24 hour(s))   GLUCOSE, POC    Collection Time: 12/13/21  5:05 PM   Result Value Ref Range    Glucose (POC) 188 (H) 65 - 117 mg/dL    Performed by [address], POC    Collection Time: 12/13/21  8:24 PM   Result Value Ref Range    Glucose (POC) 140 (H) 65 - 117 mg/dL    Performed by Pat Vegas (PCT)    GLUCOSE, POC    Collection Time: 12/14/21  7:28 AM   Result Value Ref Range    Glucose (POC) 121 (H) 65 - 117 mg/dL    Performed by Gilda Garza    CBC W/O DIFF    Collection Time: 12/14/21  8:12 AM   Result Value Ref Range    WBC 13.7 (H) 4.1 - 11.1 K/uL    RBC 3.97 (L) 4.10 - 5.70 M/uL    HGB 11.8 (L) 12.1 - 17.0 g/dL    HCT 35.7 (L) 36.6 - 50.3 %    MCV 89.9 80.0 - 99.0 FL    MCH 29.7 26.0 - 34.0 PG    MCHC 33.1 30.0 - 36.5 g/dL    RDW 13.3 11.5 - 14.5 %    PLATELET 071 983 - 921 K/uL    MPV 11.2 8.9 - 12.9 FL    NRBC 0.0 0.0  WBC    ABSOLUTE NRBC 0.00 0.00 - 6.63 K/uL   METABOLIC PANEL, BASIC    Collection Time: 12/14/21  8:12 AM   Result Value Ref Range    Sodium 138 136 - 145 mmol/L    Potassium 3.8 3.5 - 5.1 mmol/L    Chloride 107 97 - 108 mmol/L    CO2 27 21 - 32 mmol/L    Anion gap 4 (L) 5 - 15 mmol/L    Glucose 131 (H) 65 - 100 mg/dL    BUN 25 (H) 6 - 20 mg/dL    Creatinine 2.05 (H) 0.70 - 1.30 mg/dL    BUN/Creatinine ratio 12 12 - 20      GFR est AA 40 (L) >60 ml/min/1.73m2    GFR est non-AA 33 (L) >60 ml/min/1.73m2    Calcium 8.4 (L) 8.5 - 10.1 mg/dL   MAGNESIUM    Collection Time: 12/14/21  8:12 AM   Result Value Ref Range    Magnesium 1.7 1.6 - 2.4 mg/dL   GLUCOSE, POC    Collection Time: 12/14/21 11:38 AM   Result Value Ref Range    Glucose (POC) 140 (H) 65 - 117 mg/dL    Performed by Elizabeth Morse / Plan:   This is secondary to complicated UTI status pyelonephritis  Status post cystoscopy, left ureteral stent placement  Continue ceftriaxone for 14 days, can be switched to p.o. once patient is stable for discharge  Appreciate urology consult    NSTEMI  With evidence of wall motion abnormality  Appreciate cardiology consult  Plan is for cardiac catheterization once renal function improves  Continue aspirin  Status post heparin drip  Continue statin    Right-sided nephrolithiasis  No urgent need for stent placement at this time as per urology    Oral thrush  Continue nystatin    Acute kidney injury-unclear baseline  Likely multifactorial-prerenal versus renal versus postrenal  Creatinine trending down  Appreciate nephrology consult  25.0 - 29.9 Overweight / Body mass index is 25.29 kg/m². Code status: Full  Prophylaxis: heparin sc  Recommended Disposition: Home w/Family       Time spent 35 minutes involving direct patient care as well as reviewing patient's labs and coordination of care with nursing staff     Care Plan discussed with: Patient/Family/RN/Case Management        Total time spent with patient: 35 minutes.

## 2021-12-14 NOTE — PROGRESS NOTES
Cardiology Progress Note      12/14/2021 10:38 AM    Admit Date: 12/6/2021    Admit Diagnosis: Pyelonephritis [N12]      Subjective:   Patient seen and examined. He remains chest pain-free. No overnight events. Visit Vitals  BP (!) 157/83 (BP 1 Location: Left upper arm, BP Patient Position: Sitting; At rest)   Pulse (!) 58   Temp 97.8 °F (36.6 °C)   Resp 18   Ht 6' 9\" (2.057 m)   Wt 107 kg (236 lb)   SpO2 94%   BMI 25.29 kg/m²     Current Facility-Administered Medications   Medication Dose Route Frequency    allopurinoL (ZYLOPRIM) tablet 300 mg  300 mg Oral DAILY    hydroCHLOROthiazide (HYDRODIURIL) tablet 25 mg  25 mg Oral DAILY    nystatin (MYCOSTATIN) 100,000 unit/mL oral suspension 500,000 Units  500,000 Units Oral QID    0.9% sodium chloride infusion  50 mL/hr IntraVENous CONTINUOUS    heparin (porcine) injection 5,000 Units  5,000 Units SubCUTAneous Q8H    pantoprazole (PROTONIX) tablet 40 mg  40 mg Oral ACB    tamsulosin (FLOMAX) capsule 0.4 mg  0.4 mg Oral DAILY    atorvastatin (LIPITOR) tablet 80 mg  80 mg Oral DAILY    sodium chloride (NS) flush 5-40 mL  5-40 mL IntraVENous PRN    acetaminophen (TYLENOL) tablet 650 mg  650 mg Oral Q6H PRN    Or    acetaminophen (TYLENOL) suppository 650 mg  650 mg Rectal Q6H PRN    polyethylene glycol (MIRALAX) packet 17 g  17 g Oral DAILY PRN    ondansetron (ZOFRAN ODT) tablet 4 mg  4 mg Oral Q8H PRN    Or    ondansetron (ZOFRAN) injection 4 mg  4 mg IntraVENous Q6H PRN    HYDROmorphone (DILAUDID) syringe 0.5 mg  0.5 mg IntraVENous Q4H PRN    sodium chloride (NS) flush 5-40 mL  5-40 mL IntraVENous Q8H    cefTRIAXone (ROCEPHIN) 2 g in 0.9% sodium chloride (MBP/ADV) 50 mL MBP  2 g IntraVENous ACB    glucose chewable tablet 16 g  4 Tablet Oral PRN    dextrose (D50W) injection syrg 12.5-25 g  25-50 mL IntraVENous PRN    glucagon (GLUCAGEN) injection 1 mg  1 mg IntraMUSCular PRN    insulin lispro (HUMALOG) injection   SubCUTAneous AC&HS    aspirin delayed-release tablet 81 mg  81 mg Oral DAILY         Objective:      Physical Exam:  Visit Vitals  BP (!) 157/83 (BP 1 Location: Left upper arm, BP Patient Position: Sitting; At rest)   Pulse (!) 58   Temp 97.8 °F (36.6 °C)   Resp 18   Ht 6' 9\" (2.057 m)   Wt 107 kg (236 lb)   SpO2 94%   BMI 25.29 kg/m²     General Appearance:  Well developed, well nourished,alert and oriented x 3, and individual in no acute distress. Ears/Nose/Mouth/Throat:   Hearing grossly normal.         Neck: Supple. Chest:   Lungs clear to auscultation bilaterally. Cardiovascular:  Regular rate and rhythm, S1, S2 normal, no murmur. Abdomen:   Soft, non-tender, bowel sounds are active. Extremities: No edema bilaterally. Skin: Warm and dry.                Data Review:   Labs:    Recent Results (from the past 24 hour(s))   GLUCOSE, POC    Collection Time: 12/13/21 12:14 PM   Result Value Ref Range    Glucose (POC) 131 (H) 65 - 117 mg/dL    Performed by [address], POC    Collection Time: 12/13/21  5:05 PM   Result Value Ref Range    Glucose (POC) 188 (H) 65 - 117 mg/dL    Performed by [address], POC    Collection Time: 12/13/21  8:24 PM   Result Value Ref Range    Glucose (POC) 140 (H) 65 - 117 mg/dL    Performed by Samuel Galvin (PCT)    GLUCOSE, POC    Collection Time: 12/14/21  7:28 AM   Result Value Ref Range    Glucose (POC) 121 (H) 65 - 117 mg/dL    Performed by Rajat Hampton    CBC W/O DIFF    Collection Time: 12/14/21  8:12 AM   Result Value Ref Range    WBC 13.7 (H) 4.1 - 11.1 K/uL    RBC 3.97 (L) 4.10 - 5.70 M/uL    HGB 11.8 (L) 12.1 - 17.0 g/dL    HCT 35.7 (L) 36.6 - 50.3 %    MCV 89.9 80.0 - 99.0 FL    MCH 29.7 26.0 - 34.0 PG    MCHC 33.1 30.0 - 36.5 g/dL    RDW 13.3 11.5 - 14.5 %    PLATELET 638 444 - 589 K/uL    MPV 11.2 8.9 - 12.9 FL    NRBC 0.0 0.0  WBC    ABSOLUTE NRBC 0.00 0.00 - 0.01 K/uL       Telemetry: normal sinus rhythm      Assessment:   NSTEMI type II  Pyelonephritis status post ureteral stent  Acute kidney injury  Diabetes mellitus  Hypertension  Hyperlipidemia    Plan:   76-year-old male with a past medical history as above who is seen for elevated troponins after undergoing ureteral stent placement.  -Patient has had no clear anginal symptoms but he has significantly elevated troponins which are now downtrending. I reviewed his transthoracic echocardiogram which shows mild LV dysfunction with lateral, anterolateral, inferolateral wall motion abnormality consistent with coronary artery disease in the left circumflex/right coronary artery territory  -Renal function is improving now. -Continue aspirin, high intensity statin. Add beta-blockers as hemodynamically able. Resting heart rate is in the 50s hence I am reluctant to start this yet  -Once his presenting problems have stabilized and renal status has been optimized, he will need cardiac catheterization for further evaluation. Anticipate this for Monday or when kidney function normalizes, possibly Tuesday. I explained this to the patient as well. He understands and agrees. His creatinine continues to improve and was 2.1 yesterday, awaiting labs from today,will plan for cardiac cath once his renal function has normalized  -We will continue to follow the patient and give further recommendations pending his clinical course.  Discussed with family at the bedside     Thank you for allowing us to participate in the care of your patient

## 2021-12-14 NOTE — PROGRESS NOTES
Consult Date: 12/14/2021    Consults    Subjective   HISTORY OF PRESENTING ILLNESS   Patient has no new complaints today. His pena catheter was removed without any complications and he has been able to urinate without much trouble. Patient has good oral intake of food and fluids. Past Medical History:   Diagnosis Date    Diabetes ([address]     Gout     Hypertension     Hypothyroidism     Pro[facility] ([facility] [address]     Renal stones       Past Surgical History:   Procedure Laterality Date    HX PROSTATE SURGERY      HX UROLOGICAL  12/06/2021    Cystoscopy,    HX UROLOGICAL  12/06/2021     left retrograde pyelogram      HX UROLOGICAL  12/06/2021    ureteral stent placement    HX UROLOGICAL  12/06/2021    direct vision internal urethrotomy     History reviewed. No pertinent family history.    Social History     Tobacco Use    Smoking status: Never Smoker    Smokeless tobacco: Never Used   Substance Use Topics    Alcohol use: Not Currently       Current Facility-Administered Medications   Medication Dose Route Frequency Provider Last Rate Last Admin    allopurinoL (ZYLOPRIM) tablet 300 mg  300 mg Oral DAILY Bill Ro MD   300 mg at 12/14/21 0817    hydroCHLOROthiazide (HYDRODIURIL) tablet 25 mg  25 mg Oral DAILY Zhou Miller MD   25 mg at 12/14/21 0817    nystatin (MYCOSTATIN) 100,000 unit/mL oral suspension 500,000 Units  500,000 Units Oral QID Sun Hung MD   500,000 Units at 12/14/21 0817    0.9% sodium chloride infusion  50 mL/hr IntraVENous CONTINUOUS Skinny Pisano MD 50 mL/hr at 12/10/21 1709 50 mL/hr at 12/10/21 1709    heparin (porcine) injection 5,000 Units  5,000 Units SubCUTAneous Dickie Goldmann, Ahmed Nanine Kidney, MD   5,000 Units at 12/14/21 0528    pantoprazole (PROTONIX) tablet 40 mg  40 mg Oral ACB Sun Hung MD   40 mg at 12/14/21 0744    tamsulosin (FLOMAX) capsule 0.4 mg  0.4 mg Oral DAILY Skinny Pisano MD   0.4 mg at 12/14/21 0012    atorvastatin (LIPITOR) tablet 80 mg  80 mg Oral DAILY Natanael Garcia MD   80 mg at 12/14/21 0817    sodium chloride (NS) flush 5-40 mL  5-40 mL IntraVENous PRPRESLEY Newsome MD   10 mL at 12/14/21 0530    acetaminophen (TYLENOL) tablet 650 mg  650 mg Oral Q6H PRPRESLEY Newsome MD   650 mg at 12/13/21 1730    Or    acetaminophen (TYLENOL) suppository 650 mg  650 mg Rectal Q6H PRPRESLEY Newsome MD        polyethylene glycol [facility]) packet 17 g  17 g Oral DAILY PRPRESLEY Newsome MD        ondansetron [facility] ODT) tablet 4 mg  4 mg Oral Q8H PRPRESLEY Newsome MD   4 mg at 12/14/21 3148    Or    ondansetron (ZOFRAN) injection 4 mg  4 mg IntraVENous Q6H PRPRESLEY Newsome MD   4 mg at 12/13/21 1730    HYDROmorphone (DILAUDID) syringe 0.5 mg  0.5 mg IntraVENous Q4H PRPRESLEY Newsome MD   0.5 mg at 12/14/21 0737    sodium chloride (NS) flush 5-40 mL  5-40 mL IntraVENous Q8H Luis Gary MD   10 mL at 12/13/21 1313    cefTRIAXone (ROCEPHIN) 2 g in 0.9% sodium chloride (MBP/ADV) 50 mL MBP  2 g IntraVENous ACB Darius Newsome  mL/hr at 12/14/21 0744 2 g at 12/14/21 0744    glucose chewable tablet 16 g  4 Tablet Oral PRPRESLEY Nwesome MD        dextrose (D50W) injection syrg 12.5-25 g  25-50 mL IntraVENous EDILBERTO Newsome MD        glucagon [facility]) injection 1 mg  1 mg IntraMUSCular PRPRESLEY Newsome MD        insulin lispro (HUMALOG) injection   SubCUTAneous AC&HS Darius Newsome MD   2 Units at 12/13/21 1725    aspirin delayed-release tablet 81 mg  81 mg Oral DAILY Hammad Coker MD   81 mg at 12/14/21 8279        Review of Systems   Constitutional: Negative for chills and fever. HENT: Negative for congestion and trouble swallowing. Respiratory: Negative for shortness of breath and wheezing. Cardiovascular: Negative for chest pain and leg swelling. Gastrointestinal: Negative for abdominal pain.    Genitourinary: Negative for difficulty urinating, dysuria and hematuria. Musculoskeletal: Negative. Neurological: Negative. Psychiatric/Behavioral: Negative for behavioral problems and confusion. All other systems reviewed and are negative.       Objective     Vital signs for last 24 hours:  Visit Vitals  BP (!) 141/87   Pulse (!) 57   Temp 98.2 °F (36.8 °C)   Resp 14   Ht 6' 9\" (2.057 m)   Wt 107 kg (236 lb)   SpO2 95%   BMI 25.29 kg/m²           Recent Results (from the past 24 hour(s))   GLUCOSE, POC    Collection Time: 12/13/21  5:05 PM   Result Value Ref Range    Glucose (POC) 188 (H) 65 - 117 mg/dL    Performed by [address], POC    Collection Time: 12/13/21  8:24 PM   Result Value Ref Range    Glucose (POC) 140 (H) 65 - 117 mg/dL    Performed by Matt Grimes (PCT)    GLUCOSE, POC    Collection Time: 12/14/21  7:28 AM   Result Value Ref Range    Glucose (POC) 121 (H) 65 - 117 mg/dL    Performed by Bruce Kerns    CBC W/O DIFF    Collection Time: 12/14/21  8:12 AM   Result Value Ref Range    WBC 13.7 (H) 4.1 - 11.1 K/uL    RBC 3.97 (L) 4.10 - 5.70 M/uL    HGB 11.8 (L) 12.1 - 17.0 g/dL    HCT 35.7 (L) 36.6 - 50.3 %    MCV 89.9 80.0 - 99.0 FL    MCH 29.7 26.0 - 34.0 PG    MCHC 33.1 30.0 - 36.5 g/dL    RDW 13.3 11.5 - 14.5 %    PLATELET 334 361 - 121 K/uL    MPV 11.2 8.9 - 12.9 FL    NRBC 0.0 0.0  WBC    ABSOLUTE NRBC 0.00 0.00 - 5.52 K/uL   METABOLIC PANEL, BASIC    Collection Time: 12/14/21  8:12 AM   Result Value Ref Range    Sodium 138 136 - 145 mmol/L    Potassium 3.8 3.5 - 5.1 mmol/L    Chloride 107 97 - 108 mmol/L    CO2 27 21 - 32 mmol/L    Anion gap 4 (L) 5 - 15 mmol/L    Glucose 131 (H) 65 - 100 mg/dL    BUN 25 (H) 6 - 20 mg/dL    Creatinine 2.05 (H) 0.70 - 1.30 mg/dL    BUN/Creatinine ratio 12 12 - 20      GFR est AA 40 (L) >60 ml/min/1.73m2    GFR est non-AA 33 (L) >60 ml/min/1.73m2    Calcium 8.4 (L) 8.5 - 10.1 mg/dL   MAGNESIUM    Collection Time: 12/14/21  8:12 AM   Result Value Ref Range    Magnesium 1.7 1.6 - 2.4 mg/dL   GLUCOSE, POC    Collection Time: 12/14/21 11:38 AM   Result Value Ref Range    Glucose (POC) 140 (H) 65 - 117 mg/dL    Performed by Triston Curry           Intake/Output Summary (Last 24 hours) at 12/14/2021 1242  Last data filed at 12/14/2021 1120  Gross per 24 hour   Intake --   Output 4000 ml   Net -4000 ml      Current Shift: 12/14 0701 - 12/14 1900  In: -   Out: 7842 [EMLER:6532]  Last 3 Shifts: 12/12 1901 - 12/14 0700  In: -   Out: 2150 [Urine:2150]  Physical Exam  Vitals and nursing note reviewed. Constitutional:       Appearance: Normal appearance. Cardiovascular:      Rate and Rhythm: Regular rhythm. Pulses: Normal pulses. Heart sounds: Normal heart sounds. Pulmonary:      Effort: Pulmonary effort is normal.      Breath sounds: Normal breath sounds. Abdominal:      General: There is no distension. Palpations: Abdomen is soft. Skin:     General: Skin is warm and dry. Neurological:      General: No focal deficit present. Mental Status: He is alert and oriented to person, place, and time.    Psychiatric:         Mood and Affect: Mood normal.         Behavior: Behavior normal.          Data Review:   Recent Results (from the past 24 hour(s))   GLUCOSE, POC    Collection Time: 12/13/21  5:05 PM   Result Value Ref Range    Glucose (POC) 188 (H) 65 - 117 mg/dL    Performed by [address], POC    Collection Time: 12/13/21  8:24 PM   Result Value Ref Range    Glucose (POC) 140 (H) 65 - 117 mg/dL    Performed by Ari Gilliam (PCT)    GLUCOSE, POC    Collection Time: 12/14/21  7:28 AM   Result Value Ref Range    Glucose (POC) 121 (H) 65 - 117 mg/dL    Performed by Triston Curry    CBC W/O DIFF    Collection Time: 12/14/21  8:12 AM   Result Value Ref Range    WBC 13.7 (H) 4.1 - 11.1 K/uL    RBC 3.97 (L) 4.10 - 5.70 M/uL    HGB 11.8 (L) 12.1 - 17.0 g/dL    HCT 35.7 (L) 36.6 - 50.3 %    MCV 89.9 80.0 - 99.0 FL    MCH 29.7 26.0 - 34.0 PG    MCHC 33.1 30.0 - 36.5 g/dL    RDW 13.3 11.5 - 14.5 %    PLATELET 346 740 - 698 K/uL    MPV 11.2 8.9 - 12.9 FL    NRBC 0.0 0.0  WBC    ABSOLUTE NRBC 0.00 0.00 - 4.96 K/uL   METABOLIC PANEL, BASIC    Collection Time: 12/14/21  8:12 AM   Result Value Ref Range    Sodium 138 136 - 145 mmol/L    Potassium 3.8 3.5 - 5.1 mmol/L    Chloride 107 97 - 108 mmol/L    CO2 27 21 - 32 mmol/L    Anion gap 4 (L) 5 - 15 mmol/L    Glucose 131 (H) 65 - 100 mg/dL    BUN 25 (H) 6 - 20 mg/dL    Creatinine 2.05 (H) 0.70 - 1.30 mg/dL    BUN/Creatinine ratio 12 12 - 20      GFR est AA 40 (L) >60 ml/min/1.73m2    GFR est non-AA 33 (L) >60 ml/min/1.73m2    Calcium 8.4 (L) 8.5 - 10.1 mg/dL   MAGNESIUM    Collection Time: 12/14/21  8:12 AM   Result Value Ref Range    Magnesium 1.7 1.6 - 2.4 mg/dL   GLUCOSE, POC    Collection Time: 12/14/21 11:38 AM   Result Value Ref Range    Glucose (POC) 140 (H) 65 - 117 mg/dL    Performed by Celina Mcmullen          XR ABD (KUB)   Final Result      XR CHEST PORT   Final Result   No acute findings. XR FLUOROSCOPY UNDER 60 MINUTES   Final Result      CT ABD PELV WO CONT   Final Result   1. 5 x 5 mm obstructing stone in the distal left ureter. Left perinephric   inflammation. Correlate to medical renal disease to include renal dysfunction   and pyelonephritis. 2. Nonobstructing bilateral nephrolithiasis. 3. Thickening versus nondistention of the proximal duodenum. Correlate for any   clinical symptoms. 4. Trace bilateral pleural effusions. 5. Hepatomegaly. 6. Other findings above. XR CHEST PORT   Final Result           Patient Active Problem List   Diagnosis Code    Pyelonephritis N12        DIAGNOSES:  1. FERMIN on CKD, improving. 2. FERMIN due to left hydronephrosis resolved after left ureteral stent placement , which is improving  3. Nephrolithiasis  4. Hypertension  5. Diabetes  6.  Gout    DISCUSSION:   Serum creatinine level decreased to 2.05 and from nephrology standpoint, patient can undergo coronary angiography for elevated troponin level of 1138 on 12/6 which decreased to 61 on 12/8. PLAN:  · Consult cardiology for coronary angiography. Thanks for consulting me. Please don't hesitate to contact me if any questions arise of if I can assist in any manner. This dictation was done by dragon, computer voice recognition software. Often unanticipated grammatical, syntax, phones and other interpretive errors are inadvertently transcribed. Please excuse errors that have escaped final proofreading. Please contact me if you suspect dictation or transcription errors. Dr Prabhu Suresh  [address], [address] [facility], [facility]  Cell Phone: [phone]  Office phone: [phone]  Fax: [phone]   *ATTENTION:  This note has been created by a medical student for educational purposes only. Please do not refer to the content of this note for clinical decision-making, billing, or other purposes. Please see attending physicians note to obtain clinical information on this patient. *

## 2021-12-14 NOTE — PROGRESS NOTES
Subjective   HISTORY OF PRESENTING ILLNESS   Patient has no new complaints today. His pena catheter was removed without any complications and he has been able to urinate without much trouble. Patient has good oral intake of food and fluids. Past Medical History:   Diagnosis Date    Diabetes ([address]     Gout     Hypertension     Hypothyroidism     [facility] [address] [address]     Renal stones       Past Surgical History:   Procedure Laterality Date    HX PROSTATE SURGERY      HX UROLOGICAL  12/06/2021    Cystoscopy,    HX UROLOGICAL  12/06/2021     left retrograde pyelogram      HX UROLOGICAL  12/06/2021    ureteral stent placement    HX UROLOGICAL  12/06/2021    direct vision internal urethrotomy     History reviewed. No pertinent family history.    Social History     Tobacco Use    Smoking status: Never Smoker    Smokeless tobacco: Never Used   Substance Use Topics    Alcohol use: Not Currently       Current Facility-Administered Medications   Medication Dose Route Frequency Provider Last Rate Last Admin    allopurinoL (ZYLOPRIM) tablet 300 mg  300 mg Oral DAILY Ty Ro MD   300 mg at 12/14/21 0817    hydroCHLOROthiazide (HYDRODIURIL) tablet 25 mg  25 mg Oral DAILY Abdiaziz Tejeda MD   25 mg at 12/14/21 0817    nystatin (MYCOSTATIN) 100,000 unit/mL oral suspension 500,000 Units  500,000 Units Oral QID Rakesh Bai MD   500,000 Units at 12/14/21 1307    0.9% sodium chloride infusion  50 mL/hr IntraVENous CONTINUOUS Skinny Pisano MD 50 mL/hr at 12/10/21 1709 50 mL/hr at 12/10/21 1709    heparin (porcine) injection 5,000 Units  5,000 Units SubCUTAneous Ciarra Ramirez MD   5,000 Units at 12/14/21 1307    pantoprazole (PROTONIX) tablet 40 mg  40 mg Oral ACB Rakesh Bai MD   40 mg at 12/14/21 0744    tamsulosin (FLOMAX) capsule 0.4 mg  0.4 mg Oral DAILY Skinny Pisano MD   0.4 mg at 12/14/21 0817    atorvastatin (LIPITOR) tablet 80 mg  80 mg Oral DAILY Naresh Cunha MD   80 mg at 12/14/21 0817    sodium chloride (NS) flush 5-40 mL  5-40 mL IntraVENous PRN Jaye Reynaga MD   10 mL at 12/14/21 0530    acetaminophen (TYLENOL) tablet 650 mg  650 mg Oral Q6H PRN Jaye Reynaga MD   650 mg at 12/13/21 1730    Or    acetaminophen (TYLENOL) suppository 650 mg  650 mg Rectal Q6H PRN Jaye Reynaga MD        polyethylene glycol [facility]) packet 17 g  17 g Oral DAILY PRN Jaye Reynaga MD        ondansetron [facility] ODT) tablet 4 mg  4 mg Oral Q8H PRN Jaye Reynaga MD   4 mg at 12/14/21 9728    Or    ondansetron (ZOFRAN) injection 4 mg  4 mg IntraVENous Q6H PRN Jaye Reynaga MD   4 mg at 12/13/21 1730    HYDROmorphone (DILAUDID) syringe 0.5 mg  0.5 mg IntraVENous Q4H PRN Jaye Reynaga MD   0.5 mg at 12/14/21 0737    sodium chloride (NS) flush 5-40 mL  5-40 mL IntraVENous Q8H Kay Monaco MD   10 mL at 12/14/21 1307    cefTRIAXone (ROCEPHIN) 2 g in 0.9% sodium chloride (MBP/ADV) 50 mL MBP  2 g IntraVENous ACB Jaye Reynaga  mL/hr at 12/14/21 0744 2 g at 12/14/21 0744    glucose chewable tablet 16 g  4 Tablet Oral PRN Jaye Reynaga MD        dextrose (D50W) injection syrg 12.5-25 g  25-50 mL IntraVENous PRPRESLEY Reynaga MD        glucagon [facility]) injection 1 mg  1 mg IntraMUSCular PRPRESLEY Reynaga MD        insulin lispro (HUMALOG) injection   SubCUTAneous AC&HS Jaye Reynaga MD   2 Units at 12/14/21 1307    aspirin delayed-release tablet 81 mg  81 mg Oral DAILY Félix Ortiz MD   81 mg at 12/14/21 6655        Review of Systems   Constitutional: Negative for chills and fever. HENT: Negative for congestion and trouble swallowing. Respiratory: Negative for shortness of breath and wheezing. Cardiovascular: Negative for chest pain and leg swelling. Gastrointestinal: Negative for abdominal pain. Genitourinary: Negative for difficulty urinating, dysuria and hematuria.    Musculoskeletal: Negative. Neurological: Negative. Psychiatric/Behavioral: Negative for behavioral problems and confusion. All other systems reviewed and are negative.       Objective     Vital signs for last 24 hours:  Visit Vitals  BP (!) 141/87   Pulse (!) 57   Temp 98.2 °F (36.8 °C)   Resp 14   Ht 6' 9\" (2.057 m)   Wt 107 kg (236 lb)   SpO2 95%   BMI 25.29 kg/m²           Recent Results (from the past 24 hour(s))   GLUCOSE, POC    Collection Time: 12/13/21  5:05 PM   Result Value Ref Range    Glucose (POC) 188 (H) 65 - 117 mg/dL    Performed by [address], POC    Collection Time: 12/13/21  8:24 PM   Result Value Ref Range    Glucose (POC) 140 (H) 65 - 117 mg/dL    Performed by Cindy Grant (PCT)    GLUCOSE, POC    Collection Time: 12/14/21  7:28 AM   Result Value Ref Range    Glucose (POC) 121 (H) 65 - 117 mg/dL    Performed by Sam Jones    CBC W/O DIFF    Collection Time: 12/14/21  8:12 AM   Result Value Ref Range    WBC 13.7 (H) 4.1 - 11.1 K/uL    RBC 3.97 (L) 4.10 - 5.70 M/uL    HGB 11.8 (L) 12.1 - 17.0 g/dL    HCT 35.7 (L) 36.6 - 50.3 %    MCV 89.9 80.0 - 99.0 FL    MCH 29.7 26.0 - 34.0 PG    MCHC 33.1 30.0 - 36.5 g/dL    RDW 13.3 11.5 - 14.5 %    PLATELET 224 664 - 397 K/uL    MPV 11.2 8.9 - 12.9 FL    NRBC 0.0 0.0  WBC    ABSOLUTE NRBC 0.00 0.00 - 8.27 K/uL   METABOLIC PANEL, BASIC    Collection Time: 12/14/21  8:12 AM   Result Value Ref Range    Sodium 138 136 - 145 mmol/L    Potassium 3.8 3.5 - 5.1 mmol/L    Chloride 107 97 - 108 mmol/L    CO2 27 21 - 32 mmol/L    Anion gap 4 (L) 5 - 15 mmol/L    Glucose 131 (H) 65 - 100 mg/dL    BUN 25 (H) 6 - 20 mg/dL    Creatinine 2.05 (H) 0.70 - 1.30 mg/dL    BUN/Creatinine ratio 12 12 - 20      GFR est AA 40 (L) >60 ml/min/1.73m2    GFR est non-AA 33 (L) >60 ml/min/1.73m2    Calcium 8.4 (L) 8.5 - 10.1 mg/dL   MAGNESIUM    Collection Time: 12/14/21  8:12 AM   Result Value Ref Range    Magnesium 1.7 1.6 - 2.4 mg/dL   GLUCOSE, POC    Collection Time: 12/14/21 11:38 AM   Result Value Ref Range    Glucose (POC) 140 (H) 65 - 117 mg/dL    Performed by Otilia Durham           Intake/Output Summary (Last 24 hours) at 12/14/2021 1405  Last data filed at 12/14/2021 1120  Gross per 24 hour   Intake --   Output 4000 ml   Net -4000 ml      Current Shift: 12/14 0701 - 12/14 1900  In: -   Out: 8363 [OPPJY:6421]  Last 3 Shifts: 12/12 1901 - 12/14 0700  In: -   Out: 2150 [Urine:2150]  Physical Exam  Vitals and nursing note reviewed. Constitutional:       Appearance: Normal appearance. Cardiovascular:      Rate and Rhythm: Regular rhythm. Pulses: Normal pulses. Heart sounds: Normal heart sounds. Pulmonary:      Effort: Pulmonary effort is normal.      Breath sounds: Normal breath sounds. Abdominal:      General: There is no distension. Palpations: Abdomen is soft. Skin:     General: Skin is warm and dry. Neurological:      General: No focal deficit present. Mental Status: He is alert and oriented to person, place, and time.    Psychiatric:         Mood and Affect: Mood normal.         Behavior: Behavior normal.          Data Review:   Recent Results (from the past 24 hour(s))   GLUCOSE, POC    Collection Time: 12/13/21  5:05 PM   Result Value Ref Range    Glucose (POC) 188 (H) 65 - 117 mg/dL    Performed by [address], POC    Collection Time: 12/13/21  8:24 PM   Result Value Ref Range    Glucose (POC) 140 (H) 65 - 117 mg/dL    Performed by Gil Vasquez (PCT)    GLUCOSE, POC    Collection Time: 12/14/21  7:28 AM   Result Value Ref Range    Glucose (POC) 121 (H) 65 - 117 mg/dL    Performed by Otilia Durham    CBC W/O DIFF    Collection Time: 12/14/21  8:12 AM   Result Value Ref Range    WBC 13.7 (H) 4.1 - 11.1 K/uL    RBC 3.97 (L) 4.10 - 5.70 M/uL    HGB 11.8 (L) 12.1 - 17.0 g/dL    HCT 35.7 (L) 36.6 - 50.3 %    MCV 89.9 80.0 - 99.0 FL    MCH 29.7 26.0 - 34.0 PG    MCHC 33.1 30.0 - 36.5 g/dL    RDW 13.3 11.5 - 14.5 % PLATELET 475 596 - 071 K/uL    MPV 11.2 8.9 - 12.9 FL    NRBC 0.0 0.0  WBC    ABSOLUTE NRBC 0.00 0.00 - 0.62 K/uL   METABOLIC PANEL, BASIC    Collection Time: 12/14/21  8:12 AM   Result Value Ref Range    Sodium 138 136 - 145 mmol/L    Potassium 3.8 3.5 - 5.1 mmol/L    Chloride 107 97 - 108 mmol/L    CO2 27 21 - 32 mmol/L    Anion gap 4 (L) 5 - 15 mmol/L    Glucose 131 (H) 65 - 100 mg/dL    BUN 25 (H) 6 - 20 mg/dL    Creatinine 2.05 (H) 0.70 - 1.30 mg/dL    BUN/Creatinine ratio 12 12 - 20      GFR est AA 40 (L) >60 ml/min/1.73m2    GFR est non-AA 33 (L) >60 ml/min/1.73m2    Calcium 8.4 (L) 8.5 - 10.1 mg/dL   MAGNESIUM    Collection Time: 12/14/21  8:12 AM   Result Value Ref Range    Magnesium 1.7 1.6 - 2.4 mg/dL   GLUCOSE, POC    Collection Time: 12/14/21 11:38 AM   Result Value Ref Range    Glucose (POC) 140 (H) 65 - 117 mg/dL    Performed by Martha Guevara          XR ABD (KUB)   Final Result      XR CHEST PORT   Final Result   No acute findings. XR FLUOROSCOPY UNDER 60 MINUTES   Final Result      CT ABD PELV WO CONT   Final Result   1. 5 x 5 mm obstructing stone in the distal left ureter. Left perinephric   inflammation. Correlate to medical renal disease to include renal dysfunction   and pyelonephritis. 2. Nonobstructing bilateral nephrolithiasis. 3. Thickening versus nondistention of the proximal duodenum. Correlate for any   clinical symptoms. 4. Trace bilateral pleural effusions. 5. Hepatomegaly. 6. Other findings above. XR CHEST PORT   Final Result           Patient Active Problem List   Diagnosis Code    Pyelonephritis N12        DIAGNOSES:  1. FERMIN on CKD, improving. 2. FERMIN due to left hydronephrosis resolved after left ureteral stent placement , which is improving  3. Nephrolithiasis  4. Hypertension  5. Diabetes  6.  Gout    DISCUSSION:   Creatinine of 2.05-baseline not clear-GFR of 33    PLAN:  Assuming that he does not develop acute CHF, use IABP, with use of 100 mL of contrast, risk of contrast nephrotoxicity is 14% and risk of needing dialysis is 0.12%. Contrast nephrotoxicity is defined as more than 0.5 mg/dl rise in pre-PCI serum creatinine after 48 hours. Patient was advised about the above risk. Appreciate urology input  Appreciate cardiology input    Thanks for consulting me. Please don't hesitate to contact me if any questions arise of if I can assist in any manner. This dictation was done by dragon, computer voice recognition software. Often unanticipated grammatical, syntax, phones and other interpretive errors are inadvertently transcribed. Please excuse errors that have escaped final proofreading. Please contact me if you suspect dictation or transcription errors.   Dr Sophia Gilmore  [address], [address] [facility], [address] [place] [address]  Cell Phone: [phone]  Office phone: [phone]  Fax: [phone]

## 2021-12-14 NOTE — PROGRESS NOTES
Waiting for ok to have Cath. Problem: Falls - Risk of  Goal: *Absence of Falls  Description: Document Brayan Led Fall Risk and appropriate interventions in the flowsheet.   Outcome: Progressing Towards Goal  Note: Fall Risk Interventions:            Medication Interventions: Teach patient to arise slowly    Elimination Interventions: Call light in reach              Problem: Patient Education: Go to Patient Education Activity  Goal: Patient/Family Education  Outcome: Progressing Towards Goal     Problem: Patient Education: Go to Patient Education Activity  Goal: Patient/Family Education  Outcome: Progressing Towards Goal     Problem: Sepsis: Day of Diagnosis  Goal: Off Pathway (Use only if patient is Off Pathway)  Outcome: Progressing Towards Goal  Goal: *Fluid resuscitation  Outcome: Progressing Towards Goal  Goal: *Paired blood cultures prior to first dose of antibiotic  Outcome: Progressing Towards Goal  Goal: *First dose of  appropriate antibiotic within 3 hours of arrival to ED, within 1 hour of arrival to ICU  Outcome: Progressing Towards Goal  Goal: *Lactic acid with first set of blood cultures  Outcome: Progressing Towards Goal  Goal: *Pneumococcal immunization (if eligible)  Outcome: Progressing Towards Goal  Goal: *Influenza immunization (if eligible)  Outcome: Progressing Towards Goal  Goal: Activity/Safety  Outcome: Progressing Towards Goal  Goal: Consults, if ordered  Outcome: Progressing Towards Goal  Goal: Diagnostic Test/Procedures  Outcome: Progressing Towards Goal  Goal: Nutrition/Diet  Outcome: Progressing Towards Goal  Goal: Discharge Planning  Outcome: Progressing Towards Goal  Goal: Medications  Outcome: Progressing Towards Goal  Goal: Respiratory  Outcome: Progressing Towards Goal  Goal: Treatments/Interventions/Procedures  Outcome: Progressing Towards Goal  Goal: Psychosocial  Outcome: Progressing Towards Goal

## 2021-12-15 LAB
ANION GAP SERPL CALC-SCNC: 7 MMOL/L (ref 5–15)
BASOPHILS # BLD: 0.1 K/UL (ref 0–0.1)
BASOPHILS NFR BLD: 1 % (ref 0–1)
BUN SERPL-MCNC: 22 MG/DL (ref 6–20)
BUN/CREAT SERPL: 12 (ref 12–20)
CA-I BLD-MCNC: 8.3 MG/DL (ref 8.5–10.1)
CHLORIDE SERPL-SCNC: 105 MMOL/L (ref 97–108)
CO2 SERPL-SCNC: 27 MMOL/L (ref 21–32)
CREAT SERPL-MCNC: 1.87 MG/DL (ref 0.7–1.3)
DIFFERENTIAL METHOD BLD: ABNORMAL
EOSINOPHIL # BLD: 0 K/UL (ref 0–0.4)
EOSINOPHIL NFR BLD: 0 % (ref 0–7)
ERYTHROCYTE [DISTWIDTH] IN BLOOD BY AUTOMATED COUNT: 13.3 % (ref 11.5–14.5)
GLUCOSE BLD STRIP.AUTO-MCNC: 115 MG/DL (ref 65–117)
GLUCOSE BLD STRIP.AUTO-MCNC: 125 MG/DL (ref 65–117)
GLUCOSE BLD STRIP.AUTO-MCNC: 138 MG/DL (ref 65–117)
GLUCOSE BLD STRIP.AUTO-MCNC: 140 MG/DL (ref 65–117)
GLUCOSE SERPL-MCNC: 127 MG/DL (ref 65–100)
HCT VFR BLD AUTO: 35.6 % (ref 36.6–50.3)
HGB BLD-MCNC: 11.7 G/DL (ref 12.1–17)
IMM GRANULOCYTES # BLD AUTO: 0.3 K/UL (ref 0–0.04)
IMM GRANULOCYTES NFR BLD AUTO: 2 % (ref 0–0.5)
LYMPHOCYTES # BLD: 1.7 K/UL (ref 0.8–3.5)
LYMPHOCYTES NFR BLD: 15 % (ref 12–49)
MCH RBC QN AUTO: 29.6 PG (ref 26–34)
MCHC RBC AUTO-ENTMCNC: 32.9 G/DL (ref 30–36.5)
MCV RBC AUTO: 90.1 FL (ref 80–99)
MONOCYTES # BLD: 0.9 K/UL (ref 0–1)
MONOCYTES NFR BLD: 8 % (ref 5–13)
NEUTS SEG # BLD: 8.2 K/UL (ref 1.8–8)
NEUTS SEG NFR BLD: 74 % (ref 32–75)
NRBC # BLD: 0 K/UL (ref 0–0.01)
NRBC BLD-RTO: 0 PER 100 WBC
PERFORMED BY, TECHID: ABNORMAL
PERFORMED BY, TECHID: NORMAL
PLATELET # BLD AUTO: 344 K/UL (ref 150–400)
PMV BLD AUTO: 11.1 FL (ref 8.9–12.9)
POTASSIUM SERPL-SCNC: 4 MMOL/L (ref 3.5–5.1)
RBC # BLD AUTO: 3.95 M/UL (ref 4.1–5.7)
SODIUM SERPL-SCNC: 139 MMOL/L (ref 136–145)
WBC # BLD AUTO: 11.1 K/UL (ref 4.1–11.1)

## 2021-12-15 PROCEDURE — 74011000258 HC RX REV CODE- 258: Performed by: INTERNAL MEDICINE

## 2021-12-15 PROCEDURE — 82962 GLUCOSE BLOOD TEST: CPT

## 2021-12-15 PROCEDURE — 74011250637 HC RX REV CODE- 250/637: Performed by: INTERNAL MEDICINE

## 2021-12-15 PROCEDURE — 80048 BASIC METABOLIC PNL TOTAL CA: CPT

## 2021-12-15 PROCEDURE — 74011250636 HC RX REV CODE- 250/636: Performed by: INTERNAL MEDICINE

## 2021-12-15 PROCEDURE — 74011250637 HC RX REV CODE- 250/637: Performed by: HOSPITALIST

## 2021-12-15 PROCEDURE — 36415 COLL VENOUS BLD VENIPUNCTURE: CPT

## 2021-12-15 PROCEDURE — 85025 COMPLETE CBC W/AUTO DIFF WBC: CPT

## 2021-12-15 PROCEDURE — 65270000029 HC RM PRIVATE

## 2021-12-15 RX ADMIN — Medication 10 ML: at 05:47

## 2021-12-15 RX ADMIN — ASPIRIN 81 MG: 81 TABLET, COATED ORAL at 08:22

## 2021-12-15 RX ADMIN — TAMSULOSIN HYDROCHLORIDE 0.4 MG: 0.4 CAPSULE ORAL at 08:22

## 2021-12-15 RX ADMIN — ATORVASTATIN CALCIUM 80 MG: 40 TABLET, FILM COATED ORAL at 08:22

## 2021-12-15 RX ADMIN — NYSTATIN 500000 UNITS: 100000 SUSPENSION ORAL at 21:13

## 2021-12-15 RX ADMIN — NYSTATIN 500000 UNITS: 100000 SUSPENSION ORAL at 18:00

## 2021-12-15 RX ADMIN — HEPARIN SODIUM 5000 UNITS: 5000 INJECTION INTRAVENOUS; SUBCUTANEOUS at 21:13

## 2021-12-15 RX ADMIN — ALLOPURINOL 300 MG: 300 TABLET ORAL at 08:22

## 2021-12-15 RX ADMIN — PANTOPRAZOLE SODIUM 40 MG: 40 TABLET, DELAYED RELEASE ORAL at 08:22

## 2021-12-15 RX ADMIN — HYDROCHLOROTHIAZIDE 25 MG: 25 TABLET ORAL at 08:22

## 2021-12-15 RX ADMIN — HEPARIN SODIUM 5000 UNITS: 5000 INJECTION INTRAVENOUS; SUBCUTANEOUS at 13:00

## 2021-12-15 RX ADMIN — HEPARIN SODIUM 5000 UNITS: 5000 INJECTION INTRAVENOUS; SUBCUTANEOUS at 05:45

## 2021-12-15 RX ADMIN — Medication 10 ML: at 22:18

## 2021-12-15 RX ADMIN — NYSTATIN 500000 UNITS: 100000 SUSPENSION ORAL at 14:19

## 2021-12-15 RX ADMIN — HYDROMORPHONE HYDROCHLORIDE 0.5 MG: 1 INJECTION, SOLUTION INTRAMUSCULAR; INTRAVENOUS; SUBCUTANEOUS at 07:18

## 2021-12-15 RX ADMIN — CEFTRIAXONE SODIUM 2 G: 2 INJECTION, POWDER, FOR SOLUTION INTRAMUSCULAR; INTRAVENOUS at 07:17

## 2021-12-15 RX ADMIN — NYSTATIN 500000 UNITS: 100000 SUSPENSION ORAL at 08:21

## 2021-12-15 RX ADMIN — HYDROMORPHONE HYDROCHLORIDE 0.5 MG: 1 INJECTION, SOLUTION INTRAMUSCULAR; INTRAVENOUS; SUBCUTANEOUS at 21:24

## 2021-12-15 RX ADMIN — Medication 10 ML: at 14:19

## 2021-12-15 NOTE — PROGRESS NOTES
Subjective   HISTORY OF PRESENTING ILLNESS   Patient has no new complaints today. His pena catheter was removed without any complications and he has been able to urinate without much trouble. Patient remains NPO for cardiac cath today. He has no new complaints. Past Medical History:   Diagnosis Date    Diabetes ([address]     Gout     Hypertension     Hypothyroidism     [facility] [address]     Renal stones       Past Surgical History:   Procedure Laterality Date    HX PROSTATE SURGERY      HX UROLOGICAL  12/06/2021    Cystoscopy,    HX UROLOGICAL  12/06/2021     left retrograde pyelogram      HX UROLOGICAL  12/06/2021    ureteral stent placement    HX UROLOGICAL  12/06/2021    direct vision internal urethrotomy     History reviewed. No pertinent family history.    Social History     Tobacco Use    Smoking status: Never Smoker    Smokeless tobacco: Never Used   Substance Use Topics    Alcohol use: Not Currently       Current Facility-Administered Medications   Medication Dose Route Frequency Provider Last Rate Last Admin    allopurinoL (ZYLOPRIM) tablet 300 mg  300 mg Oral DAILY Jordyn Ro MD   300 mg at 12/15/21 8636    hydroCHLOROthiazide (HYDRODIURIL) tablet 25 mg  25 mg Oral DAILY Gamal Covarrubias MD   25 mg at 12/15/21 4950    nystatin (MYCOSTATIN) 100,000 unit/mL oral suspension 500,000 Units  500,000 Units Oral QID Mai Kenyon MD   500,000 Units at 12/15/21 4923    0.9% sodium chloride infusion  50 mL/hr IntraVENous CONTINUOUS Skinny Pisano MD 50 mL/hr at 12/10/21 1709 50 mL/hr at 12/10/21 1709    heparin (porcine) injection 5,000 Units  5,000 Units SubCUTAneous Ciarra Marinelli MD   5,000 Units at 12/15/21 0545    pantoprazole (PROTONIX) tablet 40 mg  40 mg Oral ACB Mai Kenyon MD   40 mg at 12/15/21 1138    tamsulosin (FLOMAX) capsule 0.4 mg  0.4 mg Oral DAILY Skinny Pisano MD   0.4 mg at 12/15/21 2753    atorvastatin (LIPITOR) tablet 80 mg  80 mg Oral DAILY Lupe Jeronimo MD   80 mg at 12/15/21 0555    sodium chloride (NS) flush 5-40 mL  5-40 mL IntraVENous PRPRESLEY Claros MD   10 mL at 12/14/21 0530    acetaminophen (TYLENOL) tablet 650 mg  650 mg Oral Q6H EDILBERTO Claros MD   650 mg at 12/13/21 1730    Or    acetaminophen (TYLENOL) suppository 650 mg  650 mg Rectal Q6H PRPRESLEY Claros MD        polyethylene glycol [facility]) packet 17 g  17 g Oral DAILY PRN Corrie Claros MD        ondansetron [facility] ODT) tablet 4 mg  4 mg Oral Q8H PRPRESLEY Claros MD   4 mg at 12/14/21 8119    Or    ondansetron (ZOFRAN) injection 4 mg  4 mg IntraVENous Q6H PRPRESLEY Claros MD   4 mg at 12/13/21 1730    HYDROmorphone (DILAUDID) syringe 0.5 mg  0.5 mg IntraVENous Q4H EDILBERTO Claros MD   0.5 mg at 12/15/21 0718    sodium chloride (NS) flush 5-40 mL  5-40 mL IntraVENous Q8H Jeri Sainz MD   10 mL at 12/15/21 0547    cefTRIAXone (ROCEPHIN) 2 g in 0.9% sodium chloride (MBP/ADV) 50 mL MBP  2 g IntraVENous ACB Corrie Claros  mL/hr at 12/15/21 0717 2 g at 12/15/21 0717    glucose chewable tablet 16 g  4 Tablet Oral PRN Corrie Claros MD        dextrose (D50W) injection syrg 12.5-25 g  25-50 mL IntraVENous PRPRESLEY Claros MD        glucagon [facility]) injection 1 mg  1 mg IntraMUSCular PRPRESLEY Claros MD        insulin lispro (HUMALOG) injection   SubCUTAneous AC&HS Corrie Claros MD   2 Units at 12/14/21 1307    aspirin delayed-release tablet 81 mg  81 mg Oral DAILY Jules Witner MD   81 mg at 12/15/21 8060        Review of Systems   Constitutional: Negative for chills and fever. HENT: Negative for congestion and trouble swallowing. Respiratory: Negative for shortness of breath and wheezing. Cardiovascular: Negative for chest pain and leg swelling. Gastrointestinal: Negative for abdominal pain.    Genitourinary: Negative for difficulty urinating, dysuria and hematuria. Musculoskeletal: Negative. Neurological: Negative. Psychiatric/Behavioral: Negative for behavioral problems and confusion. All other systems reviewed and are negative. Objective     Vital signs for last 24 hours:  Visit Vitals  BP (!) 148/84 (BP 1 Location: Left upper arm, BP Patient Position: At rest)   Pulse (!) 59   Temp 97.9 °F (36.6 °C)   Resp 16   Ht 6' 9\" (2.057 m)   Wt 107 kg (236 lb)   SpO2 94%   BMI 25.29 kg/m²           Recent Results (from the past 24 hour(s))   GLUCOSE, POC    Collection Time: 12/14/21  4:23 PM   Result Value Ref Range    Glucose (POC) 112 65 - 117 mg/dL    Performed by [address], POC    Collection Time: 12/14/21  7:17 PM   Result Value Ref Range    Glucose (POC) 169 (H) 65 - 117 mg/dL    Performed by Stephen Merritt    GLUCOSE, POC    Collection Time: 12/15/21  7:19 AM   Result Value Ref Range    Glucose (POC) 125 (H) 65 - 117 mg/dL    Performed by Sharmaine Cihld    CBC WITH AUTOMATED DIFF    Collection Time: 12/15/21  8:33 AM   Result Value Ref Range    WBC 11.1 4.1 - 11.1 K/uL    RBC 3.95 (L) 4.10 - 5.70 M/uL    HGB 11.7 (L) 12.1 - 17.0 g/dL    HCT 35.6 (L) 36.6 - 50.3 %    MCV 90.1 80.0 - 99.0 FL    MCH 29.6 26.0 - 34.0 PG    MCHC 32.9 30.0 - 36.5 g/dL    RDW 13.3 11.5 - 14.5 %    PLATELET 018 221 - 052 K/uL    MPV 11.1 8.9 - 12.9 FL    NRBC 0.0 0.0  WBC    ABSOLUTE NRBC 0.00 0.00 - 0.01 K/uL    NEUTROPHILS 74 32 - 75 %    LYMPHOCYTES 15 12 - 49 %    MONOCYTES 8 5 - 13 %    EOSINOPHILS 0 0 - 7 %    BASOPHILS 1 0 - 1 %    IMMATURE GRANULOCYTES 2 (H) 0 - 0.5 %    ABS. NEUTROPHILS 8.2 (H) 1.8 - 8.0 K/UL    ABS. LYMPHOCYTES 1.7 0.8 - 3.5 K/UL    ABS. MONOCYTES 0.9 0.0 - 1.0 K/UL    ABS. EOSINOPHILS 0.0 0.0 - 0.4 K/UL    ABS. BASOPHILS 0.1 0.0 - 0.1 K/UL    ABS. IMM.  GRANS. 0.3 (H) 0.00 - 0.04 K/UL    DF AUTOMATED     METABOLIC PANEL, BASIC    Collection Time: 12/15/21  8:33 AM   Result Value Ref Range    Sodium 139 136 - 145 mmol/L Potassium 4.0 3.5 - 5.1 mmol/L    Chloride 105 97 - 108 mmol/L    CO2 27 21 - 32 mmol/L    Anion gap 7 5 - 15 mmol/L    Glucose 127 (H) 65 - 100 mg/dL    BUN 22 (H) 6 - 20 mg/dL    Creatinine 1.87 (H) 0.70 - 1.30 mg/dL    BUN/Creatinine ratio 12 12 - 20      GFR est AA 44 (L) >60 ml/min/1.73m2    GFR est non-AA 37 (L) >60 ml/min/1.73m2    Calcium 8.3 (L) 8.5 - 10.1 mg/dL   GLUCOSE, POC    Collection Time: 12/15/21 11:26 AM   Result Value Ref Range    Glucose (POC) 115 65 - 117 mg/dL    Performed by Lisa Lange           Intake/Output Summary (Last 24 hours) at 12/15/2021 1226  Last data filed at 12/15/2021 0426  Gross per 24 hour   Intake --   Output 2875 ml   Net -2875 ml      Current Shift: No intake/output data recorded. Last 3 Shifts: 12/13 1901 - 12/15 0700  In: -   Out: 6075 [Urine:6075]  Physical Exam  Vitals and nursing note reviewed. Constitutional:       Appearance: Normal appearance. Cardiovascular:      Rate and Rhythm: Regular rhythm. Pulses: Normal pulses. Heart sounds: Normal heart sounds. Pulmonary:      Effort: Pulmonary effort is normal.      Breath sounds: Normal breath sounds. Abdominal:      General: There is no distension. Palpations: Abdomen is soft. Skin:     General: Skin is warm and dry. Neurological:      General: No focal deficit present. Mental Status: He is alert and oriented to person, place, and time.    Psychiatric:         Mood and Affect: Mood normal.         Behavior: Behavior normal.          Data Review:   Recent Results (from the past 24 hour(s))   GLUCOSE, POC    Collection Time: 12/14/21  4:23 PM   Result Value Ref Range    Glucose (POC) 112 65 - 117 mg/dL    Performed by [address], POC    Collection Time: 12/14/21  7:17 PM   Result Value Ref Range    Glucose (POC) 169 (H) 65 - 117 mg/dL    Performed by Deonte Stephens    GLUCOSE, POC    Collection Time: 12/15/21  7:19 AM   Result Value Ref Range    Glucose (POC) 125 (H) 65 - 117 mg/dL    Performed by Glendora Dance    CBC WITH AUTOMATED DIFF    Collection Time: 12/15/21  8:33 AM   Result Value Ref Range    WBC 11.1 4.1 - 11.1 K/uL    RBC 3.95 (L) 4.10 - 5.70 M/uL    HGB 11.7 (L) 12.1 - 17.0 g/dL    HCT 35.6 (L) 36.6 - 50.3 %    MCV 90.1 80.0 - 99.0 FL    MCH 29.6 26.0 - 34.0 PG    MCHC 32.9 30.0 - 36.5 g/dL    RDW 13.3 11.5 - 14.5 %    PLATELET 982 347 - 631 K/uL    MPV 11.1 8.9 - 12.9 FL    NRBC 0.0 0.0  WBC    ABSOLUTE NRBC 0.00 0.00 - 0.01 K/uL    NEUTROPHILS 74 32 - 75 %    LYMPHOCYTES 15 12 - 49 %    MONOCYTES 8 5 - 13 %    EOSINOPHILS 0 0 - 7 %    BASOPHILS 1 0 - 1 %    IMMATURE GRANULOCYTES 2 (H) 0 - 0.5 %    ABS. NEUTROPHILS 8.2 (H) 1.8 - 8.0 K/UL    ABS. LYMPHOCYTES 1.7 0.8 - 3.5 K/UL    ABS. MONOCYTES 0.9 0.0 - 1.0 K/UL    ABS. EOSINOPHILS 0.0 0.0 - 0.4 K/UL    ABS. BASOPHILS 0.1 0.0 - 0.1 K/UL    ABS. IMM. GRANS. 0.3 (H) 0.00 - 0.04 K/UL    DF AUTOMATED     METABOLIC PANEL, BASIC    Collection Time: 12/15/21  8:33 AM   Result Value Ref Range    Sodium 139 136 - 145 mmol/L    Potassium 4.0 3.5 - 5.1 mmol/L    Chloride 105 97 - 108 mmol/L    CO2 27 21 - 32 mmol/L    Anion gap 7 5 - 15 mmol/L    Glucose 127 (H) 65 - 100 mg/dL    BUN 22 (H) 6 - 20 mg/dL    Creatinine 1.87 (H) 0.70 - 1.30 mg/dL    BUN/Creatinine ratio 12 12 - 20      GFR est AA 44 (L) >60 ml/min/1.73m2    GFR est non-AA 37 (L) >60 ml/min/1.73m2    Calcium 8.3 (L) 8.5 - 10.1 mg/dL   GLUCOSE, POC    Collection Time: 12/15/21 11:26 AM   Result Value Ref Range    Glucose (POC) 115 65 - 117 mg/dL    Performed by Chandni GARCIAS          XR ABD (KUB)   Final Result      XR CHEST PORT   Final Result   No acute findings. XR FLUOROSCOPY UNDER 60 MINUTES   Final Result      CT ABD PELV WO CONT   Final Result   1. 5 x 5 mm obstructing stone in the distal left ureter. Left perinephric   inflammation. Correlate to medical renal disease to include renal dysfunction   and pyelonephritis.    2. Nonobstructing bilateral nephrolithiasis. 3. Thickening versus nondistention of the proximal duodenum. Correlate for any   clinical symptoms. 4. Trace bilateral pleural effusions. 5. Hepatomegaly. 6. Other findings above. XR CHEST PORT   Final Result           Patient Active Problem List   Diagnosis Code    Pyelonephritis N12        DIAGNOSES:  1. FERMIN on CKD, improving. 2. FERMIN due to left hydronephrosis resolved after left ureteral stent placement , which is improving  3. Nephrolithiasis  4. Hypertension  5. Diabetes  6. Gout    DISCUSSION:   Creatinine of 1.87-baseline not clear-GFR of 37; both improving. PLAN:  · Give NS before cardiac cath. · Assuming that he does not develop acute CHF, use IABP, with use of 100 mL of contrast, risk of contrast nephrotoxicity is 14% and risk of needing dialysis is 0.12%. Contrast nephrotoxicity is defined as more than 0.5 mg/dl rise in pre-PCI serum creatinine after 48 hours. · Patient was advised about the above risk. · Recommended giving NS after cardiac cath to prevent contrast nephrotoxicity and patient was agreeable. · Appreciate urology input. · Appreciate cardiology input. Thanks for consulting me. Please don't hesitate to contact me if any questions arise of if I can assist in any manner. This dictation was done by dragon, computer voice recognition software. Often unanticipated grammatical, syntax, phones and other interpretive errors are inadvertently transcribed. Please excuse errors that have escaped final proofreading. Please contact me if you suspect dictation or transcription errors.   Dr Hawa Vincent  [address], [address] [facility], [address] [facility]  Cell Phone: [phone]  Office phone: [phone]  Fax: [phone]

## 2021-12-15 NOTE — PROGRESS NOTES
Waiting for kidney levels to normalize. Will do cath. Problem: Falls - Risk of  Goal: *Absence of Falls  Description: Document Kurtis Santos Fall Risk and appropriate interventions in the flowsheet.   Outcome: Progressing Towards Goal  Note: Fall Risk Interventions:            Medication Interventions: Bed/chair exit alarm    Elimination Interventions: Call light in reach              Problem: Patient Education: Go to Patient Education Activity  Goal: Patient/Family Education  Outcome: Progressing Towards Goal     Problem: Patient Education: Go to Patient Education Activity  Goal: Patient/Family Education  Outcome: Progressing Towards Goal     Problem: Sepsis: Day of Diagnosis  Goal: Off Pathway (Use only if patient is Off Pathway)  Outcome: Progressing Towards Goal  Goal: *Fluid resuscitation  Outcome: Progressing Towards Goal  Goal: *Paired blood cultures prior to first dose of antibiotic  Outcome: Progressing Towards Goal  Goal: *First dose of  appropriate antibiotic within 3 hours of arrival to ED, within 1 hour of arrival to ICU  Outcome: Progressing Towards Goal  Goal: *Lactic acid with first set of blood cultures  Outcome: Progressing Towards Goal  Goal: *Pneumococcal immunization (if eligible)  Outcome: Progressing Towards Goal  Goal: *Influenza immunization (if eligible)  Outcome: Progressing Towards Goal  Goal: Activity/Safety  Outcome: Progressing Towards Goal  Goal: Consults, if ordered  Outcome: Progressing Towards Goal  Goal: Diagnostic Test/Procedures  Outcome: Progressing Towards Goal  Goal: Nutrition/Diet  Outcome: Progressing Towards Goal  Goal: Discharge Planning  Outcome: Progressing Towards Goal  Goal: Medications  Outcome: Progressing Towards Goal  Goal: Respiratory  Outcome: Progressing Towards Goal  Goal: Treatments/Interventions/Procedures  Outcome: Progressing Towards Goal  Goal: Psychosocial  Outcome: Progressing Towards Goal

## 2021-12-15 NOTE — PROGRESS NOTES
Hospitalist Progress Note    Subjective:   Daily Progress Note: 12/15/2021 2:13 PM    Hospital Course:  58-year-old male with history of hypertension, diabetes and hypothyroidism presented to ED on 12/6 with left flank pain associated with fever/chills along with nausea/vomiting. CT showed 5 mm obstructing stone in the distal left ureter with left perinephric inflammation. Patient was started on ceftriaxone for sepsis, admitted to ICU, underwent cystoscopy with left ureteral stent on 12/6. Patient was also however found to have elevated troponin, cardiology on board. Plan is cardiac catheterization once kidney function improves. Subjective:  I spoke with Dr. Doug Rosen, plan is still cardiac catheterization during this hospitalization once renal function improves.     Current Facility-Administered Medications   Medication Dose Route Frequency    allopurinoL (ZYLOPRIM) tablet 300 mg  300 mg Oral DAILY    hydroCHLOROthiazide (HYDRODIURIL) tablet 25 mg  25 mg Oral DAILY    nystatin (MYCOSTATIN) 100,000 unit/mL oral suspension 500,000 Units  500,000 Units Oral QID    0.9% sodium chloride infusion  50 mL/hr IntraVENous CONTINUOUS    heparin (porcine) injection 5,000 Units  5,000 Units SubCUTAneous Q8H    pantoprazole (PROTONIX) tablet 40 mg  40 mg Oral ACB    tamsulosin (FLOMAX) capsule 0.4 mg  0.4 mg Oral DAILY    atorvastatin (LIPITOR) tablet 80 mg  80 mg Oral DAILY    sodium chloride (NS) flush 5-40 mL  5-40 mL IntraVENous PRN    acetaminophen (TYLENOL) tablet 650 mg  650 mg Oral Q6H PRN    Or    acetaminophen (TYLENOL) suppository 650 mg  650 mg Rectal Q6H PRN    polyethylene glycol (MIRALAX) packet 17 g  17 g Oral DAILY PRN    ondansetron (ZOFRAN ODT) tablet 4 mg  4 mg Oral Q8H PRN    Or    ondansetron (ZOFRAN) injection 4 mg  4 mg IntraVENous Q6H PRN    HYDROmorphone (DILAUDID) syringe 0.5 mg  0.5 mg IntraVENous Q4H PRN    sodium chloride (NS) flush 5-40 mL  5-40 mL IntraVENous Q8H    cefTRIAXone (ROCEPHIN) 2 g in 0.9% sodium chloride (MBP/ADV) 50 mL MBP  2 g IntraVENous ACB    glucose chewable tablet 16 g  4 Tablet Oral PRN    dextrose (D50W) injection syrg 12.5-25 g  25-50 mL IntraVENous PRN    glucagon (GLUCAGEN) injection 1 mg  1 mg IntraMUSCular PRN    insulin lispro (HUMALOG) injection   SubCUTAneous AC&HS    aspirin delayed-release tablet 81 mg  81 mg Oral DAILY        Review of Systems  Constitutional: No fevers, No chills, No sweats, No fatigue, No Weakness  Eyes: No redness  Ears, nose, mouth, throat, and face: No nasal congestion, No sore throat, No voice change  Respiratory: No Shortness of Breath, No cough, No wheezing  Cardiovascular: No chest pain, No palpitations, No extremity edema  Gastrointestinal: No nausea, No vomiting, No diarrhea, No abdominal pain  Genitourinary: No frequency, No dysuria, No hematuria  Integument/breast: No skin lesion(s)   Neurological: No Confusion, No headaches, No dizziness      Objective:     Visit Vitals  BP (!) 148/84 (BP 1 Location: Left upper arm, BP Patient Position: At rest)   Pulse (!) 59   Temp 97.9 °F (36.6 °C)   Resp 16   Ht 6' 9\" (2.057 m)   Wt 107 kg (236 lb)   SpO2 94%   BMI 25.29 kg/m²    O2 Flow Rate (L/min): 3 l/min O2 Device: None (Room air)    Temp (24hrs), Av.8 °F (36.6 °C), Min:97.4 °F (36.3 °C), Max:98.3 °F (36.8 °C)      No intake/output data recorded.  1901 - 12/15 0700  In: -   Out: 6075 [Urine:6075]    PHYSICAL EXAM:  Constitutional: No acute distress  Skin: Extremities and face reveal no rashes. HEENT: Sclerae anicteric. Extra-occular muscles are intact. No oral ulcers. The neck is supple and no masses. Cardiovascular: Regular rate and rhythm. Respiratory:  Clear breath sounds bilaterally with no wheezes, rales, or rhonchi. GI: Abdomen nondistended, soft, and nontender. Normal active bowel sounds. Musculoskeletal: No pitting edema of the lower legs.  Able to move all ext  Neurological:  Patient is alert and oriented. Cranial nerves II-XII grossly intact  Psychiatric: Mood appears appropriate       Data Review    Recent Results (from the past 24 hour(s))   GLUCOSE, POC    Collection Time: 12/14/21  4:23 PM   Result Value Ref Range    Glucose (POC) 112 65 - 117 mg/dL    Performed by [address], POC    Collection Time: 12/14/21  7:17 PM   Result Value Ref Range    Glucose (POC) 169 (H) 65 - 117 mg/dL    Performed by Juan Jose Escalona    GLUCOSE, POC    Collection Time: 12/15/21  7:19 AM   Result Value Ref Range    Glucose (POC) 125 (H) 65 - 117 mg/dL    Performed by Kenrick Pastor    CBC WITH AUTOMATED DIFF    Collection Time: 12/15/21  8:33 AM   Result Value Ref Range    WBC 11.1 4.1 - 11.1 K/uL    RBC 3.95 (L) 4.10 - 5.70 M/uL    HGB 11.7 (L) 12.1 - 17.0 g/dL    HCT 35.6 (L) 36.6 - 50.3 %    MCV 90.1 80.0 - 99.0 FL    MCH 29.6 26.0 - 34.0 PG    MCHC 32.9 30.0 - 36.5 g/dL    RDW 13.3 11.5 - 14.5 %    PLATELET 470 244 - 272 K/uL    MPV 11.1 8.9 - 12.9 FL    NRBC 0.0 0.0  WBC    ABSOLUTE NRBC 0.00 0.00 - 0.01 K/uL    NEUTROPHILS 74 32 - 75 %    LYMPHOCYTES 15 12 - 49 %    MONOCYTES 8 5 - 13 %    EOSINOPHILS 0 0 - 7 %    BASOPHILS 1 0 - 1 %    IMMATURE GRANULOCYTES 2 (H) 0 - 0.5 %    ABS. NEUTROPHILS 8.2 (H) 1.8 - 8.0 K/UL    ABS. LYMPHOCYTES 1.7 0.8 - 3.5 K/UL    ABS. MONOCYTES 0.9 0.0 - 1.0 K/UL    ABS. EOSINOPHILS 0.0 0.0 - 0.4 K/UL    ABS. BASOPHILS 0.1 0.0 - 0.1 K/UL    ABS. IMM.  GRANS. 0.3 (H) 0.00 - 0.04 K/UL    DF AUTOMATED     METABOLIC PANEL, BASIC    Collection Time: 12/15/21  8:33 AM   Result Value Ref Range    Sodium 139 136 - 145 mmol/L    Potassium 4.0 3.5 - 5.1 mmol/L    Chloride 105 97 - 108 mmol/L    CO2 27 21 - 32 mmol/L    Anion gap 7 5 - 15 mmol/L    Glucose 127 (H) 65 - 100 mg/dL    BUN 22 (H) 6 - 20 mg/dL    Creatinine 1.87 (H) 0.70 - 1.30 mg/dL    BUN/Creatinine ratio 12 12 - 20      GFR est AA 44 (L) >60 ml/min/1.73m2    GFR est non-AA 37 (L) >60 ml/min/1.73m2    Calcium 8.3 (L) 8.5 - 10.1 mg/dL   GLUCOSE, POC    Collection Time: 12/15/21 11:26 AM   Result Value Ref Range    Glucose (POC) 115 65 - 117 mg/dL    Performed by Pritesh Langley          Assessment / Plan:  Sepsis s/s  complicated UTI status pyelonephritis  Status post cystoscopy, left ureteral stent placement  Continue ceftriaxone for 14 days, can be switched to p.o. once patient is stable for discharge  Appreciate urology consult    NSTEMI  With evidence of wall motion abnormality  Appreciate cardiology consult  Plan is for cardiac catheterization once renal function improves  Continue aspirin  Status post heparin drip  Continue statin      Right-sided nephrolithiasis  No urgent need for stent placement at this time as per urology    Oral thrush  Continue nystatin    Acute kidney injury-unclear baseline- improving  Likely multifactorial-prerenal versus renal versus postrenal  Creatinine trending down  Appreciate nephrology consult      25.0 - 29.9 Overweight / Body mass index is 25.29 kg/m². Code status: Full  Prophylaxis: heparin sc  Recommended Disposition: Home w/Family       Time spent 35 minutes involving direct patient care as well as reviewing patient's labs and coordination of care with nursing staff     Care Plan discussed with: Patient/Family/RN/Case Management        Total time spent with patient: 35 minutes.

## 2021-12-15 NOTE — PROGRESS NOTES
Problem: Falls - Risk of  Goal: *Absence of Falls  Description: Document Sam Lamb Fall Risk and appropriate interventions in the flowsheet.   Outcome: Progressing Towards Goal  Note: Fall Risk Interventions:            Medication Interventions: Bed/chair exit alarm    Elimination Interventions: Call light in reach              Problem: Patient Education: Go to Patient Education Activity  Goal: Patient/Family Education  Outcome: Progressing Towards Goal

## 2021-12-15 NOTE — PROGRESS NOTES
Subjective   HISTORY OF PRESENTING ILLNESS   Patient has no new complaints today. His pena catheter was removed without any complications and he has been able to urinate without much trouble. Patient remains NPO for cardiac cath today. He has no new complaints. Past Medical History:   Diagnosis Date    Diabetes ([facility] [address]     Gout     Hypertension     Hypothyroidism     Prostate cancer ([facility] [address]     Renal stones       Past Surgical History:   Procedure Laterality Date    HX PROSTATE SURGERY      HX UROLOGICAL  12/06/2021    Cystoscopy,    HX UROLOGICAL  12/06/2021     left retrograde pyelogram      HX UROLOGICAL  12/06/2021    ureteral stent placement    HX UROLOGICAL  12/06/2021    direct vision internal urethrotomy     History reviewed. No pertinent family history.    Social History     Tobacco Use    Smoking status: Never Smoker    Smokeless tobacco: Never Used   Substance Use Topics    Alcohol use: Not Currently       Current Facility-Administered Medications   Medication Dose Route Frequency Provider Last Rate Last Admin    allopurinoL (ZYLOPRIM) tablet 300 mg  300 mg Oral DAILY Larry Ro MD   300 mg at 12/15/21 0973    hydroCHLOROthiazide (HYDRODIURIL) tablet 25 mg  25 mg Oral DAILY Dwain Quintanilla MD   25 mg at 12/15/21 8841    nystatin (MYCOSTATIN) 100,000 unit/mL oral suspension 500,000 Units  500,000 Units Oral QID Pavel Guzman MD   500,000 Units at 12/15/21 1832    0.9% sodium chloride infusion  50 mL/hr IntraVENous CONTINUOUS Skinny Pisano MD 50 mL/hr at 12/10/21 1709 50 mL/hr at 12/10/21 1709    heparin (porcine) injection 5,000 Units  5,000 Units SubCUTAneous Ciarra Saunders MD   5,000 Units at 12/15/21 0545    pantoprazole (PROTONIX) tablet 40 mg  40 mg Oral ACB Pavel Guzman MD   40 mg at 12/15/21 5531    tamsulosin (FLOMAX) capsule 0.4 mg  0.4 mg Oral DAILY Skinny Pisano MD   0.4 mg at 12/15/21 4198    atorvastatin (LIPITOR) tablet 80 mg  80 mg Oral DAILY Savanah Jimenez MD   80 mg at 12/15/21 1695    sodium chloride (NS) flush 5-40 mL  5-40 mL IntraVENous PRN Freddie Morel MD   10 mL at 12/14/21 0530    acetaminophen (TYLENOL) tablet 650 mg  650 mg Oral Q6H PRN Freddie Morel MD   650 mg at 12/13/21 1730    Or    acetaminophen (TYLENOL) suppository 650 mg  650 mg Rectal Q6H PRN Freddie Morel MD        polyethylene glycol [facility]) packet 17 g  17 g Oral DAILY PRN Freddie Morel MD        ondansetron [facility] ODT) tablet 4 mg  4 mg Oral Q8H PRN Freddie Morel MD   4 mg at 12/14/21 9738    Or    ondansetron (ZOFRAN) injection 4 mg  4 mg IntraVENous Q6H PRN Freddie Morel MD   4 mg at 12/13/21 1730    HYDROmorphone (DILAUDID) syringe 0.5 mg  0.5 mg IntraVENous Q4H PRN Freddie Morel MD   0.5 mg at 12/15/21 0718    sodium chloride (NS) flush 5-40 mL  5-40 mL IntraVENous Q8H Christianne Valles MD   10 mL at 12/15/21 0547    cefTRIAXone (ROCEPHIN) 2 g in 0.9% sodium chloride (MBP/ADV) 50 mL MBP  2 g IntraVENous ACB Freddie Morel  mL/hr at 12/15/21 0717 2 g at 12/15/21 0717    glucose chewable tablet 16 g  4 Tablet Oral PRN Freddie Morel MD        dextrose (D50W) injection syrg 12.5-25 g  25-50 mL IntraVENous PRN Freddie Morel MD        glucagon [facility]) injection 1 mg  1 mg IntraMUSCular PRN Freddie Morel MD        insulin lispro (HUMALOG) injection   SubCUTAneous AC&HS Freddie Morel MD   2 Units at 12/14/21 1307    aspirin delayed-release tablet 81 mg  81 mg Oral DAILY Brunilda Goodman MD   81 mg at 12/15/21 8715        Review of Systems   Constitutional: Negative for chills and fever. HENT: Negative for congestion and trouble swallowing. Respiratory: Negative for shortness of breath and wheezing. Cardiovascular: Negative for chest pain and leg swelling. Gastrointestinal: Negative for abdominal pain.    Genitourinary: Negative for difficulty urinating, dysuria and hematuria. Musculoskeletal: Negative. Neurological: Negative. Psychiatric/Behavioral: Negative for behavioral problems and confusion. All other systems reviewed and are negative. Objective     Vital signs for last 24 hours:  Visit Vitals  BP (!) 148/84 (BP 1 Location: Left upper arm, BP Patient Position: At rest)   Pulse (!) 59   Temp 97.9 °F (36.6 °C)   Resp 16   Ht 6' 9\" (2.057 m)   Wt 107 kg (236 lb)   SpO2 94%   BMI 25.29 kg/m²           Recent Results (from the past 24 hour(s))   GLUCOSE, POC    Collection Time: 12/14/21  4:23 PM   Result Value Ref Range    Glucose (POC) 112 65 - 117 mg/dL    Performed by [address], POC    Collection Time: 12/14/21  7:17 PM   Result Value Ref Range    Glucose (POC) 169 (H) 65 - 117 mg/dL    Performed by Annabelle Ashley    GLUCOSE, POC    Collection Time: 12/15/21  7:19 AM   Result Value Ref Range    Glucose (POC) 125 (H) 65 - 117 mg/dL    Performed by Shiva Townsend    CBC WITH AUTOMATED DIFF    Collection Time: 12/15/21  8:33 AM   Result Value Ref Range    WBC 11.1 4.1 - 11.1 K/uL    RBC 3.95 (L) 4.10 - 5.70 M/uL    HGB 11.7 (L) 12.1 - 17.0 g/dL    HCT 35.6 (L) 36.6 - 50.3 %    MCV 90.1 80.0 - 99.0 FL    MCH 29.6 26.0 - 34.0 PG    MCHC 32.9 30.0 - 36.5 g/dL    RDW 13.3 11.5 - 14.5 %    PLATELET 893 743 - 992 K/uL    MPV 11.1 8.9 - 12.9 FL    NRBC 0.0 0.0  WBC    ABSOLUTE NRBC 0.00 0.00 - 0.01 K/uL    NEUTROPHILS 74 32 - 75 %    LYMPHOCYTES 15 12 - 49 %    MONOCYTES 8 5 - 13 %    EOSINOPHILS 0 0 - 7 %    BASOPHILS 1 0 - 1 %    IMMATURE GRANULOCYTES 2 (H) 0 - 0.5 %    ABS. NEUTROPHILS 8.2 (H) 1.8 - 8.0 K/UL    ABS. LYMPHOCYTES 1.7 0.8 - 3.5 K/UL    ABS. MONOCYTES 0.9 0.0 - 1.0 K/UL    ABS. EOSINOPHILS 0.0 0.0 - 0.4 K/UL    ABS. BASOPHILS 0.1 0.0 - 0.1 K/UL    ABS. IMM.  GRANS. 0.3 (H) 0.00 - 0.04 K/UL    DF AUTOMATED     METABOLIC PANEL, BASIC    Collection Time: 12/15/21  8:33 AM   Result Value Ref Range    Sodium 139 136 - 145 mmol/L Potassium 4.0 3.5 - 5.1 mmol/L    Chloride 105 97 - 108 mmol/L    CO2 27 21 - 32 mmol/L    Anion gap 7 5 - 15 mmol/L    Glucose 127 (H) 65 - 100 mg/dL    BUN 22 (H) 6 - 20 mg/dL    Creatinine 1.87 (H) 0.70 - 1.30 mg/dL    BUN/Creatinine ratio 12 12 - 20      GFR est AA 44 (L) >60 ml/min/1.73m2    GFR est non-AA 37 (L) >60 ml/min/1.73m2    Calcium 8.3 (L) 8.5 - 10.1 mg/dL   GLUCOSE, POC    Collection Time: 12/15/21 11:26 AM   Result Value Ref Range    Glucose (POC) 115 65 - 117 mg/dL    Performed by Kory Jane           Intake/Output Summary (Last 24 hours) at 12/15/2021 1354  Last data filed at 12/15/2021 0426  Gross per 24 hour   Intake --   Output 2875 ml   Net -2875 ml      Current Shift: No intake/output data recorded. Last 3 Shifts: 12/13 1901 - 12/15 0700  In: -   Out: 6075 [Urine:6075]  Physical Exam  Vitals and nursing note reviewed. Constitutional:       Appearance: Normal appearance. Cardiovascular:      Rate and Rhythm: Regular rhythm. Pulses: Normal pulses. Heart sounds: Normal heart sounds. Pulmonary:      Effort: Pulmonary effort is normal.      Breath sounds: Normal breath sounds. Abdominal:      General: There is no distension. Palpations: Abdomen is soft. Skin:     General: Skin is warm and dry. Neurological:      General: No focal deficit present. Mental Status: He is alert and oriented to person, place, and time.    Psychiatric:         Mood and Affect: Mood normal.         Behavior: Behavior normal.          Data Review:   Recent Results (from the past 24 hour(s))   GLUCOSE, POC    Collection Time: 12/14/21  4:23 PM   Result Value Ref Range    Glucose (POC) 112 65 - 117 mg/dL    Performed by [address], POC    Collection Time: 12/14/21  7:17 PM   Result Value Ref Range    Glucose (POC) 169 (H) 65 - 117 mg/dL    Performed by Annabelle Ashley    GLUCOSE, POC    Collection Time: 12/15/21  7:19 AM   Result Value Ref Range    Glucose (POC) 125 (H) 65 - 117 mg/dL    Performed by Dereck Macias    CBC WITH AUTOMATED DIFF    Collection Time: 12/15/21  8:33 AM   Result Value Ref Range    WBC 11.1 4.1 - 11.1 K/uL    RBC 3.95 (L) 4.10 - 5.70 M/uL    HGB 11.7 (L) 12.1 - 17.0 g/dL    HCT 35.6 (L) 36.6 - 50.3 %    MCV 90.1 80.0 - 99.0 FL    MCH 29.6 26.0 - 34.0 PG    MCHC 32.9 30.0 - 36.5 g/dL    RDW 13.3 11.5 - 14.5 %    PLATELET 973 635 - 183 K/uL    MPV 11.1 8.9 - 12.9 FL    NRBC 0.0 0.0  WBC    ABSOLUTE NRBC 0.00 0.00 - 0.01 K/uL    NEUTROPHILS 74 32 - 75 %    LYMPHOCYTES 15 12 - 49 %    MONOCYTES 8 5 - 13 %    EOSINOPHILS 0 0 - 7 %    BASOPHILS 1 0 - 1 %    IMMATURE GRANULOCYTES 2 (H) 0 - 0.5 %    ABS. NEUTROPHILS 8.2 (H) 1.8 - 8.0 K/UL    ABS. LYMPHOCYTES 1.7 0.8 - 3.5 K/UL    ABS. MONOCYTES 0.9 0.0 - 1.0 K/UL    ABS. EOSINOPHILS 0.0 0.0 - 0.4 K/UL    ABS. BASOPHILS 0.1 0.0 - 0.1 K/UL    ABS. IMM. GRANS. 0.3 (H) 0.00 - 0.04 K/UL    DF AUTOMATED     METABOLIC PANEL, BASIC    Collection Time: 12/15/21  8:33 AM   Result Value Ref Range    Sodium 139 136 - 145 mmol/L    Potassium 4.0 3.5 - 5.1 mmol/L    Chloride 105 97 - 108 mmol/L    CO2 27 21 - 32 mmol/L    Anion gap 7 5 - 15 mmol/L    Glucose 127 (H) 65 - 100 mg/dL    BUN 22 (H) 6 - 20 mg/dL    Creatinine 1.87 (H) 0.70 - 1.30 mg/dL    BUN/Creatinine ratio 12 12 - 20      GFR est AA 44 (L) >60 ml/min/1.73m2    GFR est non-AA 37 (L) >60 ml/min/1.73m2    Calcium 8.3 (L) 8.5 - 10.1 mg/dL   GLUCOSE, POC    Collection Time: 12/15/21 11:26 AM   Result Value Ref Range    Glucose (POC) 115 65 - 117 mg/dL    Performed by [employer]          XR ABD (KUB)   Final Result      XR CHEST PORT   Final Result   No acute findings. XR FLUOROSCOPY UNDER 60 MINUTES   Final Result      CT ABD PELV WO CONT   Final Result   1. 5 x 5 mm obstructing stone in the distal left ureter. Left perinephric   inflammation. Correlate to medical renal disease to include renal dysfunction   and pyelonephritis.    2. Nonobstructing bilateral nephrolithiasis. 3. Thickening versus nondistention of the proximal duodenum. Correlate for any   clinical symptoms. 4. Trace bilateral pleural effusions. 5. Hepatomegaly. 6. Other findings above. XR CHEST PORT   Final Result           Patient Active Problem List   Diagnosis Code    Pyelonephritis N12        DIAGNOSES:  1. FERMIN on CKD, improving. 2. FERMIN due to left hydronephrosis resolved after left ureteral stent placement , which is improving  3. Nephrolithiasis  4. Hypertension  5. Diabetes  6. Gout    DISCUSSION:   Creatinine of 1.87-baseline not clear-GFR of 37; both improving. PLAN:  · I reviewed normal saline at 50 mill per hour had a modest rate to mitigate risk of contrast nephrotoxicity. · If patient's gets short of breath or dyspneic, can discontinue normal saline right away. Thanks for consulting me. Please don't hesitate to contact me if any questions arise of if I can assist in any manner. This dictation was done by dragon, computer voice recognition software. Often unanticipated grammatical, syntax, phones and other interpretive errors are inadvertently transcribed. Please excuse errors that have escaped final proofreading. Please contact me if you suspect dictation or transcription errors.   Dr Prabhu Suresh  [address], [address] [facility], [address] [facility]  Cell Phone: [phone]  Office phone: [phone]  Fax: [phone]

## 2021-12-15 NOTE — PROGRESS NOTES
Cardiology Progress Note      12/15/2021 10:38 AM    Admit Date: 12/6/2021    Admit Diagnosis: Pyelonephritis [N12]      Subjective:   Patient seen and examined. He remains chest pain-free. No overnight events.     Visit Vitals  /76 (BP 1 Location: Left upper arm, BP Patient Position: At rest)   Pulse 63   Temp 98.3 °F (36.8 °C)   Resp 18   Ht 6' 9\" (2.057 m)   Wt 107 kg (236 lb)   SpO2 95%   BMI 25.29 kg/m²     Current Facility-Administered Medications   Medication Dose Route Frequency    allopurinoL (ZYLOPRIM) tablet 300 mg  300 mg Oral DAILY    hydroCHLOROthiazide (HYDRODIURIL) tablet 25 mg  25 mg Oral DAILY    nystatin (MYCOSTATIN) 100,000 unit/mL oral suspension 500,000 Units  500,000 Units Oral QID    0.9% sodium chloride infusion  50 mL/hr IntraVENous CONTINUOUS    heparin (porcine) injection 5,000 Units  5,000 Units SubCUTAneous Q8H    pantoprazole (PROTONIX) tablet 40 mg  40 mg Oral ACB    tamsulosin (FLOMAX) capsule 0.4 mg  0.4 mg Oral DAILY    atorvastatin (LIPITOR) tablet 80 mg  80 mg Oral DAILY    sodium chloride (NS) flush 5-40 mL  5-40 mL IntraVENous PRN    acetaminophen (TYLENOL) tablet 650 mg  650 mg Oral Q6H PRN    Or    acetaminophen (TYLENOL) suppository 650 mg  650 mg Rectal Q6H PRN    polyethylene glycol (MIRALAX) packet 17 g  17 g Oral DAILY PRN    ondansetron (ZOFRAN ODT) tablet 4 mg  4 mg Oral Q8H PRN    Or    ondansetron (ZOFRAN) injection 4 mg  4 mg IntraVENous Q6H PRN    HYDROmorphone (DILAUDID) syringe 0.5 mg  0.5 mg IntraVENous Q4H PRN    sodium chloride (NS) flush 5-40 mL  5-40 mL IntraVENous Q8H    cefTRIAXone (ROCEPHIN) 2 g in 0.9% sodium chloride (MBP/ADV) 50 mL MBP  2 g IntraVENous ACB    glucose chewable tablet 16 g  4 Tablet Oral PRN    dextrose (D50W) injection syrg 12.5-25 g  25-50 mL IntraVENous PRN    glucagon (GLUCAGEN) injection 1 mg  1 mg IntraMUSCular PRN    insulin lispro (HUMALOG) injection   SubCUTAneous AC&HS    aspirin delayed-release tablet 81 mg  81 mg Oral DAILY         Objective:      Physical Exam:  Visit Vitals  /76 (BP 1 Location: Left upper arm, BP Patient Position: At rest)   Pulse 63   Temp 98.3 °F (36.8 °C)   Resp 18   Ht 6' 9\" (2.057 m)   Wt 107 kg (236 lb)   SpO2 95%   BMI 25.29 kg/m²     General Appearance:  Well developed, well nourished,alert and oriented x 3, and individual in no acute distress. Ears/Nose/Mouth/Throat:   Hearing grossly normal.         Neck: Supple. Chest:   Lungs clear to auscultation bilaterally. Cardiovascular:  Regular rate and rhythm, S1, S2 normal, no murmur. Abdomen:   Soft, non-tender, bowel sounds are active. Extremities: No edema bilaterally. Skin: Warm and dry. Data Review:   Labs:    Recent Results (from the past 24 hour(s))   GLUCOSE, POC    Collection Time: 12/14/21  4:23 PM   Result Value Ref Range    Glucose (POC) 112 65 - 117 mg/dL    Performed by [address], POC    Collection Time: 12/14/21  7:17 PM   Result Value Ref Range    Glucose (POC) 169 (H) 65 - 117 mg/dL    Performed by Vasquez Ramirez    GLUCOSE, POC    Collection Time: 12/15/21  7:19 AM   Result Value Ref Range    Glucose (POC) 125 (H) 65 - 117 mg/dL    Performed by Autumn Hammond    CBC WITH AUTOMATED DIFF    Collection Time: 12/15/21  8:33 AM   Result Value Ref Range    WBC 11.1 4.1 - 11.1 K/uL    RBC 3.95 (L) 4.10 - 5.70 M/uL    HGB 11.7 (L) 12.1 - 17.0 g/dL    HCT 35.6 (L) 36.6 - 50.3 %    MCV 90.1 80.0 - 99.0 FL    MCH 29.6 26.0 - 34.0 PG    MCHC 32.9 30.0 - 36.5 g/dL    RDW 13.3 11.5 - 14.5 %    PLATELET 790 636 - 849 K/uL    MPV 11.1 8.9 - 12.9 FL    NRBC 0.0 0.0  WBC    ABSOLUTE NRBC 0.00 0.00 - 0.01 K/uL    NEUTROPHILS 74 32 - 75 %    LYMPHOCYTES 15 12 - 49 %    MONOCYTES 8 5 - 13 %    EOSINOPHILS 0 0 - 7 %    BASOPHILS 1 0 - 1 %    IMMATURE GRANULOCYTES 2 (H) 0 - 0.5 %    ABS. NEUTROPHILS 8.2 (H) 1.8 - 8.0 K/UL    ABS. LYMPHOCYTES 1.7 0.8 - 3.5 K/UL    ABS.  MONOCYTES 0.9 0.0 - 1.0 K/UL    ABS. EOSINOPHILS 0.0 0.0 - 0.4 K/UL    ABS. BASOPHILS 0.1 0.0 - 0.1 K/UL    ABS. IMM. GRANS. 0.3 (H) 0.00 - 0.04 K/UL    DF AUTOMATED     METABOLIC PANEL, BASIC    Collection Time: 12/15/21  8:33 AM   Result Value Ref Range    Sodium 139 136 - 145 mmol/L    Potassium 4.0 3.5 - 5.1 mmol/L    Chloride 105 97 - 108 mmol/L    CO2 27 21 - 32 mmol/L    Anion gap 7 5 - 15 mmol/L    Glucose 127 (H) 65 - 100 mg/dL    BUN 22 (H) 6 - 20 mg/dL    Creatinine 1.87 (H) 0.70 - 1.30 mg/dL    BUN/Creatinine ratio 12 12 - 20      GFR est AA 44 (L) >60 ml/min/1.73m2    GFR est non-AA 37 (L) >60 ml/min/1.73m2    Calcium 8.3 (L) 8.5 - 10.1 mg/dL   GLUCOSE, POC    Collection Time: 12/15/21 11:26 AM   Result Value Ref Range    Glucose (POC) 115 65 - 117 mg/dL    Performed by [facility]        Telemetry: normal sinus rhythm      Assessment:   NSTEMI type II  Pyelonephritis status post ureteral stent  Acute kidney injury  Diabetes mellitus  Hypertension  Hyperlipidemia    Plan:   58-year-old male with a past medical history as above who is seen for elevated troponins after undergoing ureteral stent placement.  -Patient has had no clear anginal symptoms but he has significantly elevated troponins which are now downtrending. I reviewed his transthoracic echocardiogram which shows mild LV dysfunction with lateral, anterolateral, inferolateral wall motion abnormality consistent with coronary artery disease in the left circumflex/right coronary artery territory  -Renal function is improving now. -Continue aspirin, high intensity statin. Add beta-blockers as hemodynamically able. Resting heart rate is in the 50s hence I am reluctant to start this yet  -Once his presenting problems have stabilized and renal status has been optimized, he will need cardiac catheterization for further evaluation. I explained this to the patient as well. He understands and agrees.   His creatinine continues to improve and is 1.8, will plan for cardiac cath once his renal function has normalized  -We will continue to follow the patient and give further recommendations pending his clinical course.      Thank you for allowing us to participate in the care of your patient

## 2021-12-16 PROBLEM — N17.9 AKI (ACUTE KIDNEY INJURY) (HCC): Status: ACTIVE | Noted: 2021-12-16

## 2021-12-16 LAB
GLUCOSE BLD STRIP.AUTO-MCNC: 125 MG/DL (ref 65–117)
GLUCOSE BLD STRIP.AUTO-MCNC: 128 MG/DL (ref 65–117)
GLUCOSE BLD STRIP.AUTO-MCNC: 132 MG/DL (ref 65–117)
GLUCOSE BLD STRIP.AUTO-MCNC: 142 MG/DL (ref 65–117)
GLUCOSE BLD STRIP.AUTO-MCNC: 153 MG/DL (ref 65–117)
GLUCOSE BLD STRIP.AUTO-MCNC: 157 MG/DL (ref 65–117)
PERFORMED BY, TECHID: ABNORMAL

## 2021-12-16 PROCEDURE — 74011250637 HC RX REV CODE- 250/637: Performed by: INTERNAL MEDICINE

## 2021-12-16 PROCEDURE — 82962 GLUCOSE BLOOD TEST: CPT

## 2021-12-16 PROCEDURE — 36415 COLL VENOUS BLD VENIPUNCTURE: CPT

## 2021-12-16 PROCEDURE — 74011250636 HC RX REV CODE- 250/636: Performed by: INTERNAL MEDICINE

## 2021-12-16 PROCEDURE — 65270000029 HC RM PRIVATE

## 2021-12-16 PROCEDURE — 74011000258 HC RX REV CODE- 258: Performed by: INTERNAL MEDICINE

## 2021-12-16 PROCEDURE — 74011636637 HC RX REV CODE- 636/637: Performed by: INTERNAL MEDICINE

## 2021-12-16 RX ORDER — LEVOTHYROXINE SODIUM 100 UG/1
200 TABLET ORAL
Status: DISCONTINUED | OUTPATIENT
Start: 2021-12-16 | End: 2021-12-21 | Stop reason: HOSPADM

## 2021-12-16 RX ADMIN — Medication 10 ML: at 06:02

## 2021-12-16 RX ADMIN — Medication 10 ML: at 20:01

## 2021-12-16 RX ADMIN — Medication 10 ML: at 13:23

## 2021-12-16 RX ADMIN — HEPARIN SODIUM 5000 UNITS: 5000 INJECTION INTRAVENOUS; SUBCUTANEOUS at 05:28

## 2021-12-16 RX ADMIN — ALLOPURINOL 300 MG: 300 TABLET ORAL at 09:01

## 2021-12-16 RX ADMIN — TAMSULOSIN HYDROCHLORIDE 0.4 MG: 0.4 CAPSULE ORAL at 09:01

## 2021-12-16 RX ADMIN — INSULIN LISPRO 2 UNITS: 100 INJECTION, SOLUTION INTRAVENOUS; SUBCUTANEOUS at 11:46

## 2021-12-16 RX ADMIN — CEFTRIAXONE SODIUM 2 G: 2 INJECTION, POWDER, FOR SOLUTION INTRAMUSCULAR; INTRAVENOUS at 09:01

## 2021-12-16 RX ADMIN — LEVOTHYROXINE SODIUM 200 MCG: 0.1 TABLET ORAL at 11:56

## 2021-12-16 RX ADMIN — NYSTATIN 500000 UNITS: 100000 SUSPENSION ORAL at 09:01

## 2021-12-16 RX ADMIN — ATORVASTATIN CALCIUM 80 MG: 40 TABLET, FILM COATED ORAL at 09:01

## 2021-12-16 RX ADMIN — HYDROMORPHONE HYDROCHLORIDE 0.5 MG: 1 INJECTION, SOLUTION INTRAMUSCULAR; INTRAVENOUS; SUBCUTANEOUS at 05:49

## 2021-12-16 RX ADMIN — NYSTATIN 500000 UNITS: 100000 SUSPENSION ORAL at 19:59

## 2021-12-16 RX ADMIN — HYDROCHLOROTHIAZIDE 25 MG: 25 TABLET ORAL at 09:01

## 2021-12-16 RX ADMIN — PANTOPRAZOLE SODIUM 40 MG: 40 TABLET, DELAYED RELEASE ORAL at 09:01

## 2021-12-16 RX ADMIN — HYDROMORPHONE HYDROCHLORIDE 0.5 MG: 1 INJECTION, SOLUTION INTRAMUSCULAR; INTRAVENOUS; SUBCUTANEOUS at 20:06

## 2021-12-16 RX ADMIN — HEPARIN SODIUM 5000 UNITS: 5000 INJECTION INTRAVENOUS; SUBCUTANEOUS at 19:59

## 2021-12-16 NOTE — ASSESSMENT & PLAN NOTE
- In the setting of obstructing ureteral stone and resulting hydronephrosis  - Now s/p ureteral stent  - Urine output maintained  - Creatinine trend continues to show improvement  - Also had element of volume depletion in the setting of severe nausea and vomiting preceding ER visit in the setting of acute nephrolithiaisis  - Prior history of nephrolithiasis    Plan:   - Continue on Normal saline for gentle fluid replenishment  - If patient is tolerating PO intake, can discontinue further IV fluids in next 24 hours however  - Outpatient follow up with nephrology and Urology

## 2021-12-16 NOTE — PROGRESS NOTES
Hospitalist Progress Note    Subjective:   Daily Progress Note: 12/16/2021 2:13 PM    Hospital Course:  68-year-old male with history of hypertension, diabetes and hypothyroidism presented to ED on 12/6 with left flank pain associated with fever/chills along with nausea/vomiting. CT showed 5 mm obstructing stone in the distal left ureter with left perinephric inflammation. Patient was started on ceftriaxone for sepsis, admitted to ICU, underwent cystoscopy with left ureteral stent on 12/6. Patient was also however found to have elevated troponin, cardiology on board. Plan is cardiac catheterization once kidney function improves. Subjective:  Patient denies any new complaints  Labs from morning is pending.     Current Facility-Administered Medications   Medication Dose Route Frequency    levothyroxine (SYNTHROID) tablet 200 mcg  200 mcg Oral ACB    allopurinoL (ZYLOPRIM) tablet 300 mg  300 mg Oral DAILY    hydroCHLOROthiazide (HYDRODIURIL) tablet 25 mg  25 mg Oral DAILY    nystatin (MYCOSTATIN) 100,000 unit/mL oral suspension 500,000 Units  500,000 Units Oral QID    0.9% sodium chloride infusion  50 mL/hr IntraVENous CONTINUOUS    heparin (porcine) injection 5,000 Units  5,000 Units SubCUTAneous Q8H    pantoprazole (PROTONIX) tablet 40 mg  40 mg Oral ACB    tamsulosin (FLOMAX) capsule 0.4 mg  0.4 mg Oral DAILY    atorvastatin (LIPITOR) tablet 80 mg  80 mg Oral DAILY    sodium chloride (NS) flush 5-40 mL  5-40 mL IntraVENous PRN    acetaminophen (TYLENOL) tablet 650 mg  650 mg Oral Q6H PRN    Or    acetaminophen (TYLENOL) suppository 650 mg  650 mg Rectal Q6H PRN    polyethylene glycol (MIRALAX) packet 17 g  17 g Oral DAILY PRN    ondansetron (ZOFRAN ODT) tablet 4 mg  4 mg Oral Q8H PRN    Or    ondansetron (ZOFRAN) injection 4 mg  4 mg IntraVENous Q6H PRN    HYDROmorphone (DILAUDID) syringe 0.5 mg  0.5 mg IntraVENous Q4H PRN    sodium chloride (NS) flush 5-40 mL  5-40 mL IntraVENous Q8H    cefTRIAXone (ROCEPHIN) 2 g in 0.9% sodium chloride (MBP/ADV) 50 mL MBP  2 g IntraVENous ACB    glucose chewable tablet 16 g  4 Tablet Oral PRN    dextrose (D50W) injection syrg 12.5-25 g  25-50 mL IntraVENous PRN    glucagon (GLUCAGEN) injection 1 mg  1 mg IntraMUSCular PRN    insulin lispro (HUMALOG) injection   SubCUTAneous AC&HS    aspirin delayed-release tablet 81 mg  81 mg Oral DAILY        Review of Systems  Constitutional: No fevers, No chills, No sweats, No fatigue, No Weakness  Eyes: No redness  Ears, nose, mouth, throat, and face: No nasal congestion, No sore throat, No voice change  Respiratory: No Shortness of Breath, No cough, No wheezing  Cardiovascular: No chest pain, No palpitations, No extremity edema  Gastrointestinal: No nausea, No vomiting, No diarrhea, No abdominal pain  Genitourinary: No frequency, No dysuria, No hematuria  Integument/breast: No skin lesion(s)   Neurological: No Confusion, No headaches, No dizziness      Objective:     Visit Vitals  /66 (BP 1 Location: Left upper arm, BP Patient Position: Sitting; At rest)   Pulse 60   Temp 98.4 °F (36.9 °C)   Resp 20   Ht 6' 9\" (2.057 m)   Wt 107 kg (236 lb)   SpO2 94%   BMI 25.29 kg/m²    O2 Flow Rate (L/min): 3 l/min O2 Device: None (Room air)    Temp (24hrs), Av.1 °F (36.7 °C), Min:97.6 °F (36.4 °C), Max:98.4 °F (36.9 °C)      No intake/output data recorded.  1901 -  0700  In: -   Out: 3095 [Urine:3095]    PHYSICAL EXAM:  Constitutional: No acute distress  Skin: Extremities and face reveal no rashes. HEENT: Sclerae anicteric. Extra-occular muscles are intact. No oral ulcers. The neck is supple and no masses. Cardiovascular: Regular rate and rhythm. Respiratory:  Clear breath sounds bilaterally with no wheezes, rales, or rhonchi. GI: Abdomen nondistended, soft, and nontender. Normal active bowel sounds. Musculoskeletal: No pitting edema of the lower legs.  Able to move all ext  Neurological:  Patient is alert and oriented. Cranial nerves II-XII grossly intact  Psychiatric: Mood appears appropriate       Data Review    Recent Results (from the past 24 hour(s))   GLUCOSE, POC    Collection Time: 12/15/21 11:26 AM   Result Value Ref Range    Glucose (POC) 115 65 - 117 mg/dL    Performed by FABIAN Mar [address], POC    Collection Time: 12/15/21  4:24 PM   Result Value Ref Range    Glucose (POC) 140 (H) 65 - 117 mg/dL    Performed by Christ[address], POC    Collection Time: 12/15/21  9:30 PM   Result Value Ref Range    Glucose (POC) 138 (H) 65 - 117 mg/dL    Performed by Dakota Bahena    GLUCOSE, POC    Collection Time: 12/16/21  8:04 AM   Result Value Ref Range    Glucose (POC) 128 (H) 65 - 117 mg/dL    Performed by [facility] [address], POC    Collection Time: 12/16/21 11:10 AM   Result Value Ref Range    Glucose (POC) 153 (H) 65 - 117 mg/dL    Performed by Dex Brito / Plan:  complicated UTI status pyelonephritis  Status post cystoscopy, left ureteral stent placement  Continue ceftriaxone for 14 days, can be switched to p.o. once patient is stable for discharge  Appreciate urology consult    NSTEMI  With evidence of wall motion abnormality  Appreciate cardiology consult  Plan is for cardiac catheterization once renal function improves  Continue aspirin  Status post heparin drip  Continue statin    Right-sided nephrolithiasis  No urgent need for stent placement at this time as per urology    Oral thrush  Continue nystatin    Acute kidney injury-unclear baseline  Likely multifactorial-prerenal versus renal versus postrenal  Creatinine trending down  Appreciate nephrology consult  25.0 - 29.9 Overweight / Body mass index is 25.29 kg/m².     Code status: Full  Prophylaxis: heparin sc  Recommended Disposition: Home w/Family       Time spent 35 minutes involving direct patient care as well as reviewing patient's labs and coordination of care with nursing staff     Care Plan discussed with: Patient/Family/RN/Case Management        Total time spent with patient: 35 minutes.

## 2021-12-16 NOTE — PROGRESS NOTES
Progress Note  Date:2021       Room:[facility]  Patient Name:Fritz Brewer     YOB: 1960     Age:61 y.o. Subjective    Subjective Reports significant improvement in general wellbeing; reports no nausea or vomiting. Reports slight pain in flank region   Review of Systems Positive for flank pain which is mild  Objective         Vitals Last 24 Hours:  TEMPERATURE:  Temp  Av.1 °F (36.7 °C)  Min: 97.6 °F (36.4 °C)  Max: 98.4 °F (36.9 °C)  RESPIRATIONS RANGE: Resp  Av.3  Min: 18  Max: 20  PULSE OXIMETRY RANGE: SpO2  Av.5 %  Min: 94 %  Max: 96 %  PULSE RANGE: Pulse  Av.3  Min: 51  Max: 63  BLOOD PRESSURE RANGE: Systolic (75ZDJ), SNM:358 , Min:116 , AUZ:343   ; Diastolic (49SUK), MARY JANE:84, Min:66, Max:85    I/O (24Hr): No intake or output data in the 24 hours ending 21 1524  Objective  Labs/Imaging/Diagnostics    Labs:  CBC:Recent Labs     12/15/21  0833 21  0812   WBC 11.1 13.7*   RBC 3.95* 3.97*   HGB 11.7* 11.8*   HCT 35.6* 35.7*   MCV 90.1 89.9   RDW 13.3 13.3    342     CHEMISTRIES:  Recent Labs     12/15/21  0833 21  0812    138   K 4.0 3.8    107   CO2 27 27   BUN 22* 25*   CA 8.3* 8.4*   MG  --  1.7   PT/INR:No results for input(s): INR, INREXT in the last 72 hours. No lab exists for component: PROTIME  APTT:No results for input(s): APTT in the last 72 hours. LIVER PROFILE:No results for input(s): AST, ALT in the last 72 hours. No lab exists for component: Christine Rojas ALKPHOS  Lab Results   Component Value Date/Time    ALT (SGPT) 46 2021 05:25 AM    AST (SGOT) 52 (H) 2021 05:25 AM    Alk. phosphatase 117 2021 05:25 AM    Bilirubin, total 0.8 2021 05:25 AM       Imaging Last 24 Hours:  No results found.   Assessment//Plan   Active Problems:    Pyelonephritis (2021)      FERMIN (acute kidney injury) ([facility] [address] (2021)    Physical Exam:   Gen: Alert, conversant  HEENT: No JVD  Ext: No significant swelling      Assessment & Plan  FERMIN (acute kidney injury) ([address]  - In the setting of obstructing ureteral stone and resulting hydronephrosis  - Now s/p ureteral stent  - Urine output maintained  - Creatinine trend continues to show improvement  - Also had element of volume depletion in the setting of severe nausea and vomiting preceding ER visit in the setting of acute nephrolithiaisis  - Prior history of nephrolithiasis    Plan:   - Continue on Normal saline for gentle fluid replenishment  - If patient is tolerating PO intake, can discontinue further IV fluids in next 24 hours however  - Outpatient follow up with nephrology and Urology       Electronically signed by Francisca Langston MD on 12/16/2021 at 3:24 PM

## 2021-12-16 NOTE — PROGRESS NOTES
Chart reviewed, DCP remains for patient to d/c to home self care once medically stable. CM continues to follow.

## 2021-12-16 NOTE — PROGRESS NOTES
Problem: Falls - Risk of  Goal: *Absence of Falls  Description: Document Leafy Nice Fall Risk and appropriate interventions in the flowsheet.   Outcome: Progressing Towards Goal  Note: Fall Risk Interventions:            Medication Interventions: Patient to call before getting OOB,Teach patient to arise slowly    Elimination Interventions: Call light in reach,Patient to call for help with toileting needs,Urinal in reach              Problem: Sepsis: Day of Diagnosis  Goal: Diagnostic Test/Procedures  Outcome: Progressing Towards Goal  Goal: Treatments/Interventions/Procedures  Outcome: Progressing Towards Goal     Problem: Patient Education: Go to Patient Education Activity  Goal: Patient/Family Education  Outcome: Progressing Towards Goal

## 2021-12-16 NOTE — PROGRESS NOTES
Waiting on kidney levels to normalize to have cath.       Problem: Patient Education: Go to Patient Education Activity  Goal: Patient/Family Education  Outcome: Progressing Towards Goal     Problem: Patient Education: Go to Patient Education Activity  Goal: Patient/Family Education  Outcome: Progressing Towards Goal

## 2021-12-17 LAB
ANION GAP SERPL CALC-SCNC: 5 MMOL/L (ref 5–15)
BUN SERPL-MCNC: 23 MG/DL (ref 6–20)
BUN/CREAT SERPL: 13 (ref 12–20)
CA-I BLD-MCNC: 8.4 MG/DL (ref 8.5–10.1)
CHLORIDE SERPL-SCNC: 102 MMOL/L (ref 97–108)
CO2 SERPL-SCNC: 30 MMOL/L (ref 21–32)
CREAT SERPL-MCNC: 1.76 MG/DL (ref 0.7–1.3)
GLUCOSE BLD STRIP.AUTO-MCNC: 111 MG/DL (ref 65–117)
GLUCOSE BLD STRIP.AUTO-MCNC: 136 MG/DL (ref 65–117)
GLUCOSE BLD STRIP.AUTO-MCNC: 147 MG/DL (ref 65–117)
GLUCOSE BLD STRIP.AUTO-MCNC: 166 MG/DL (ref 65–117)
GLUCOSE SERPL-MCNC: 125 MG/DL (ref 65–100)
PERFORMED BY, TECHID: ABNORMAL
PERFORMED BY, TECHID: NORMAL
POTASSIUM SERPL-SCNC: 4.2 MMOL/L (ref 3.5–5.1)
SODIUM SERPL-SCNC: 137 MMOL/L (ref 136–145)

## 2021-12-17 PROCEDURE — 74011250637 HC RX REV CODE- 250/637: Performed by: INTERNAL MEDICINE

## 2021-12-17 PROCEDURE — 74011250636 HC RX REV CODE- 250/636: Performed by: INTERNAL MEDICINE

## 2021-12-17 PROCEDURE — 36415 COLL VENOUS BLD VENIPUNCTURE: CPT

## 2021-12-17 PROCEDURE — 80048 BASIC METABOLIC PNL TOTAL CA: CPT

## 2021-12-17 PROCEDURE — 82962 GLUCOSE BLOOD TEST: CPT

## 2021-12-17 PROCEDURE — 74011000258 HC RX REV CODE- 258: Performed by: INTERNAL MEDICINE

## 2021-12-17 PROCEDURE — 74011250637 HC RX REV CODE- 250/637: Performed by: HOSPITALIST

## 2021-12-17 PROCEDURE — 65270000029 HC RM PRIVATE

## 2021-12-17 RX ORDER — ALLOPURINOL 300 MG/1
300 TABLET ORAL DAILY
Status: DISCONTINUED | OUTPATIENT
Start: 2021-12-18 | End: 2021-12-17 | Stop reason: SDUPTHER

## 2021-12-17 RX ORDER — LOSARTAN POTASSIUM 25 MG/1
25 TABLET ORAL DAILY
Status: DISCONTINUED | OUTPATIENT
Start: 2021-12-18 | End: 2021-12-21 | Stop reason: HOSPADM

## 2021-12-17 RX ADMIN — CEFTRIAXONE SODIUM 2 G: 2 INJECTION, POWDER, FOR SOLUTION INTRAMUSCULAR; INTRAVENOUS at 09:37

## 2021-12-17 RX ADMIN — NYSTATIN 500000 UNITS: 100000 SUSPENSION ORAL at 17:35

## 2021-12-17 RX ADMIN — HEPARIN SODIUM 5000 UNITS: 5000 INJECTION INTRAVENOUS; SUBCUTANEOUS at 13:47

## 2021-12-17 RX ADMIN — HEPARIN SODIUM 5000 UNITS: 5000 INJECTION INTRAVENOUS; SUBCUTANEOUS at 20:49

## 2021-12-17 RX ADMIN — ALLOPURINOL 300 MG: 300 TABLET ORAL at 09:37

## 2021-12-17 RX ADMIN — LEVOTHYROXINE SODIUM 200 MCG: 0.1 TABLET ORAL at 09:37

## 2021-12-17 RX ADMIN — ONDANSETRON 4 MG: 2 INJECTION INTRAMUSCULAR; INTRAVENOUS at 05:08

## 2021-12-17 RX ADMIN — TAMSULOSIN HYDROCHLORIDE 0.4 MG: 0.4 CAPSULE ORAL at 09:36

## 2021-12-17 RX ADMIN — ACETAMINOPHEN 650 MG: 325 TABLET ORAL at 05:08

## 2021-12-17 RX ADMIN — Medication 10 ML: at 06:23

## 2021-12-17 RX ADMIN — Medication 10 ML: at 21:49

## 2021-12-17 RX ADMIN — NYSTATIN 500000 UNITS: 100000 SUSPENSION ORAL at 20:49

## 2021-12-17 RX ADMIN — NYSTATIN 500000 UNITS: 100000 SUSPENSION ORAL at 10:22

## 2021-12-17 RX ADMIN — Medication 10 ML: at 14:14

## 2021-12-17 RX ADMIN — Medication 10 ML: at 20:50

## 2021-12-17 RX ADMIN — NYSTATIN 500000 UNITS: 100000 SUSPENSION ORAL at 13:48

## 2021-12-17 RX ADMIN — ACETAMINOPHEN 650 MG: 325 TABLET ORAL at 19:45

## 2021-12-17 RX ADMIN — HEPARIN SODIUM 5000 UNITS: 5000 INJECTION INTRAVENOUS; SUBCUTANEOUS at 05:00

## 2021-12-17 RX ADMIN — PANTOPRAZOLE SODIUM 40 MG: 40 TABLET, DELAYED RELEASE ORAL at 09:36

## 2021-12-17 RX ADMIN — HYDROCHLOROTHIAZIDE 25 MG: 25 TABLET ORAL at 09:37

## 2021-12-17 RX ADMIN — ONDANSETRON 4 MG: 2 INJECTION INTRAMUSCULAR; INTRAVENOUS at 19:45

## 2021-12-17 RX ADMIN — ASPIRIN 81 MG: 81 TABLET, COATED ORAL at 09:37

## 2021-12-17 RX ADMIN — ATORVASTATIN CALCIUM 80 MG: 40 TABLET, FILM COATED ORAL at 09:36

## 2021-12-17 NOTE — PROGRESS NOTES
Hospitalist Progress Note    Subjective:   Daily Progress Note: 12/17/2021 2:13 PM    Hospital Course:  40-year-old male with history of hypertension, diabetes and hypothyroidism presented to ED on 12/6 with left flank pain associated with fever/chills along with nausea/vomiting. CT showed 5 mm obstructing stone in the distal left ureter with left perinephric inflammation. Patient was started on ceftriaxone for sepsis, admitted to ICU, underwent cystoscopy with left ureteral stent on 12/6. Patient was also however found to have elevated troponin, cardiology on board. Plan is cardiac catheterization on Monday.     Subjective:  Patient denies any new complaints  Cr 1.7 today    Current Facility-Administered Medications   Medication Dose Route Frequency    levothyroxine (SYNTHROID) tablet 200 mcg  200 mcg Oral ACB    allopurinoL (ZYLOPRIM) tablet 300 mg  300 mg Oral DAILY    hydroCHLOROthiazide (HYDRODIURIL) tablet 25 mg  25 mg Oral DAILY    nystatin (MYCOSTATIN) 100,000 unit/mL oral suspension 500,000 Units  500,000 Units Oral QID    heparin (porcine) injection 5,000 Units  5,000 Units SubCUTAneous Q8H    pantoprazole (PROTONIX) tablet 40 mg  40 mg Oral ACB    tamsulosin (FLOMAX) capsule 0.4 mg  0.4 mg Oral DAILY    atorvastatin (LIPITOR) tablet 80 mg  80 mg Oral DAILY    sodium chloride (NS) flush 5-40 mL  5-40 mL IntraVENous PRN    acetaminophen (TYLENOL) tablet 650 mg  650 mg Oral Q6H PRN    Or    acetaminophen (TYLENOL) suppository 650 mg  650 mg Rectal Q6H PRN    polyethylene glycol (MIRALAX) packet 17 g  17 g Oral DAILY PRN    ondansetron (ZOFRAN ODT) tablet 4 mg  4 mg Oral Q8H PRN    Or    ondansetron (ZOFRAN) injection 4 mg  4 mg IntraVENous Q6H PRN    HYDROmorphone (DILAUDID) syringe 0.5 mg  0.5 mg IntraVENous Q4H PRN    sodium chloride (NS) flush 5-40 mL  5-40 mL IntraVENous Q8H    cefTRIAXone (ROCEPHIN) 2 g in 0.9% sodium chloride (MBP/ADV) 50 mL MBP  2 g IntraVENous ACB    glucose chewable tablet 16 g  4 Tablet Oral PRN    dextrose (D50W) injection syrg 12.5-25 g  25-50 mL IntraVENous PRN    glucagon (GLUCAGEN) injection 1 mg  1 mg IntraMUSCular PRN    insulin lispro (HUMALOG) injection   SubCUTAneous AC&HS    aspirin delayed-release tablet 81 mg  81 mg Oral DAILY        Review of Systems  Constitutional: No fevers, No chills, No sweats, No fatigue, No Weakness  Eyes: No redness  Ears, nose, mouth, throat, and face: No nasal congestion, No sore throat, No voice change  Respiratory: No Shortness of Breath, No cough, No wheezing  Cardiovascular: No chest pain, No palpitations, No extremity edema  Gastrointestinal: No nausea, No vomiting, No diarrhea, No abdominal pain  Genitourinary: No frequency, No dysuria, No hematuria  Integument/breast: No skin lesion(s)   Neurological: No Confusion, No headaches, No dizziness      Objective:     Visit Vitals  /63 (BP 1 Location: Left upper arm, BP Patient Position: Sitting)   Pulse 64   Temp 97.5 °F (36.4 °C)   Resp 18   Ht 6' 9\" (2.057 m)   Wt 107 kg (236 lb)   SpO2 95%   BMI 25.29 kg/m²    O2 Flow Rate (L/min): 3 l/min O2 Device: None (Room air)    Temp (24hrs), Av.9 °F (36.6 °C), Min:97.5 °F (36.4 °C), Max:98.3 °F (36.8 °C)      No intake/output data recorded. No intake/output data recorded. PHYSICAL EXAM:  Constitutional: No acute distress  Skin: Extremities and face reveal no rashes. HEENT: Sclerae anicteric. Extra-occular muscles are intact. No oral ulcers. The neck is supple and no masses. Cardiovascular: Regular rate and rhythm. Respiratory:  Clear breath sounds bilaterally with no wheezes, rales, or rhonchi. GI: Abdomen nondistended, soft, and nontender. Normal active bowel sounds. Musculoskeletal: No pitting edema of the lower legs. Able to move all ext  Neurological:  Patient is alert and oriented.  Cranial nerves II-XII grossly intact  Psychiatric: Mood appears appropriate       Data Review    Recent Results (from the past 24 hour(s))   GLUCOSE, POC    Collection Time: 12/16/21  4:21 PM   Result Value Ref Range    Glucose (POC) 125 (H) 65 - 117 mg/dL    Performed by [address] [place] [address], POC    Collection Time: 12/16/21  7:11 PM   Result Value Ref Range    Glucose (POC) 132 (H) 65 - 117 mg/dL    Performed by Adan Drake    GLUCOSE, POC    Collection Time: 12/16/21  7:57 PM   Result Value Ref Range    Glucose (POC) 157 (H) 65 - 117 mg/dL    Performed by Khang Grissom    METABOLIC PANEL, BASIC    Collection Time: 12/17/21  5:17 AM   Result Value Ref Range    Sodium 137 136 - 145 mmol/L    Potassium 4.2 3.5 - 5.1 mmol/L    Chloride 102 97 - 108 mmol/L    CO2 30 21 - 32 mmol/L    Anion gap 5 5 - 15 mmol/L    Glucose 125 (H) 65 - 100 mg/dL    BUN 23 (H) 6 - 20 mg/dL    Creatinine 1.76 (H) 0.70 - 1.30 mg/dL    BUN/Creatinine ratio 13 12 - 20      GFR est AA 48 (L) >60 ml/min/1.73m2    GFR est non-AA 39 (L) >60 ml/min/1.73m2    Calcium 8.4 (L) 8.5 - 10.1 mg/dL   GLUCOSE, POC    Collection Time: 12/17/21  7:53 AM   Result Value Ref Range    Glucose (POC) 111 65 - 117 mg/dL    Performed by [address] [place] [address], POC    Collection Time: 12/17/21 11:30 AM   Result Value Ref Range    Glucose (POC) 136 (H) 65 - 117 mg/dL    Performed by [address] [place] [address], POC    Collection Time: 12/17/21  2:29 PM   Result Value Ref Range    Glucose (POC) 147 (H) 65 - 117 mg/dL    Performed by Calvin Mai / Plan:  complicated UTI status pyelonephritis  Status post cystoscopy, left ureteral stent placement  Continue ceftriaxone for 14 days, can be switched to p.o. once patient is stable for discharge  Appreciate urology consult    NSTEMI  With evidence of wall motion abnormality  Appreciate cardiology consult  Plan is for cardiac catheterization on Monday  Continue aspirin  Status post heparin drip  Continue statin    Right-sided nephrolithiasis  No urgent need for stent placement at this time as per urology    Oral thrush  Continue nystatin    Acute kidney injury-unclear baseline  Likely multifactorial-prerenal versus renal versus postrenal  Creatinine trending down  Appreciate nephrology consult  25.0 - 29.9 Overweight / Body mass index is 25.29 kg/m². Code status: Full  Prophylaxis: heparin sc  Recommended Disposition: Home w/Family       Time spent 35 minutes involving direct patient care as well as reviewing patient's labs and coordination of care with nursing staff     Care Plan discussed with: Patient/Family/RN/Case Management        Total time spent with patient: 35 minutes.

## 2021-12-17 NOTE — PROGRESS NOTES
Subjective   HISTORY OF PRESENTING ILLNESS   Patient was seen and examined  No new complaints at this time. Eating well. Cardiac procedure probably defer to Monday    Past Medical History:   Diagnosis Date    Diabetes ([facility] [facility]     Gout     Hypertension     Hypothyroidism     Prostate cancer ([facility]     Renal stones       Past Surgical History:   Procedure Laterality Date    HX PROSTATE SURGERY      HX UROLOGICAL  12/06/2021    Cystoscopy,    HX UROLOGICAL  12/06/2021     left retrograde pyelogram      HX UROLOGICAL  12/06/2021    ureteral stent placement    HX UROLOGICAL  12/06/2021    direct vision internal urethrotomy     History reviewed. No pertinent family history.    Social History     Tobacco Use    Smoking status: Never Smoker    Smokeless tobacco: Never Used   Substance Use Topics    Alcohol use: Not Currently       Current Facility-Administered Medications   Medication Dose Route Frequency Provider Last Rate Last Admin    levothyroxine (SYNTHROID) tablet 200 mcg  200 mcg Oral ACB Genie Fong MD   200 mcg at 12/17/21 0587    allopurinoL (ZYLOPRIM) tablet 300 mg  300 mg Oral DAILY Isaiah Drummond MD   300 mg at 12/17/21 2320    hydroCHLOROthiazide (HYDRODIURIL) tablet 25 mg  25 mg Oral DAILY Isaiah Drummond MD   25 mg at 12/17/21 5528    nystatin (MYCOSTATIN) 100,000 unit/mL oral suspension 500,000 Units  500,000 Units Oral QID Linda Cobb MD   500,000 Units at 12/17/21 1022    heparin (porcine) injection 5,000 Units  5,000 Units SubCUTAneous Ciarra Quinteros MD   5,000 Units at 12/17/21 0500    pantoprazole (PROTONIX) tablet 40 mg  40 mg Oral ACB Linda Cobb MD   40 mg at 12/17/21 0936    tamsulosin (FLOMAX) capsule 0.4 mg  0.4 mg Oral DAILY Skinny Pisano MD   0.4 mg at 12/17/21 0936    atorvastatin (LIPITOR) tablet 80 mg  80 mg Oral DAILY Nuris Estrada MD   80 mg at 12/17/21 0936    sodium chloride (NS) flush 5-40 mL  5-40 mL IntraVENous PRN Lauro Vieira MD   10 mL at 12/14/21 0530    acetaminophen (TYLENOL) tablet 650 mg  650 mg Oral Q6H PRN Lauro Vieira MD   650 mg at 12/17/21 2892    Or    acetaminophen (TYLENOL) suppository 650 mg  650 mg Rectal Q6H PRN Lauro Vieira MD        polyethylene glycol [facility]) packet 17 g  17 g Oral DAILY PRN Lauro Vieira MD        ondansetron [facility] ODT) tablet 4 mg  4 mg Oral Q8H PRN Lauro Vieira MD   4 mg at 12/14/21 0848    Or    ondansetron (ZOFRAN) injection 4 mg  4 mg IntraVENous Q6H PRN Lauro Vieira MD   4 mg at 12/17/21 8727    HYDROmorphone (DILAUDID) syringe 0.5 mg  0.5 mg IntraVENous Q4H PRN Lauro Vieira MD   0.5 mg at 12/16/21 2006    sodium chloride (NS) flush 5-40 mL  5-40 mL IntraVENous Q8H Van eKnnedy MD   10 mL at 12/17/21 2076    cefTRIAXone (ROCEPHIN) 2 g in 0.9% sodium chloride (MBP/ADV) 50 mL MBP  2 g IntraVENous ACB Lauro Vieira  mL/hr at 12/17/21 0937 2 g at 12/17/21 6294    glucose chewable tablet 16 g  4 Tablet Oral PRN Lauro Vieira MD        dextrose (D50W) injection syrg 12.5-25 g  25-50 mL IntraVENous PRN Lauro Vieira MD        glucagon [facility]) injection 1 mg  1 mg IntraMUSCular PRN Lauro Vieira MD        insulin lispro (HUMALOG) injection   SubCUTAneous AC&HS Lauro Vieira MD   2 Units at 12/16/21 1146    aspirin delayed-release tablet 81 mg  81 mg Oral DAILY Katie Garcia MD   81 mg at 12/17/21 5940        Review of Systems   Constitutional: Negative for chills and fever. HENT: Negative for congestion and trouble swallowing. Respiratory: Negative for shortness of breath and wheezing. Cardiovascular: Negative for chest pain and leg swelling. Gastrointestinal: Negative for abdominal pain. Genitourinary: Negative for difficulty urinating, dysuria and hematuria. Musculoskeletal: Negative. Neurological: Negative. Psychiatric/Behavioral: Negative for behavioral problems and confusion. All other systems reviewed and are negative. Objective     Vital signs for last 24 hours:  Visit Vitals  /83 (BP 1 Location: Left upper arm, BP Patient Position: Sitting)   Pulse (!) 57   Temp 98.1 °F (36.7 °C)   Resp 17   Ht 6' 9\" (2.057 m)   Wt 107 kg (236 lb)   SpO2 96%   BMI 25.29 kg/m²           Recent Results (from the past 24 hour(s))   GLUCOSE, POC    Collection Time: 12/16/21  2:25 PM   Result Value Ref Range    Glucose (POC) 142 (H) 65 - 117 mg/dL    Performed by Ingrid Monroy    GLUCOSE, POC    Collection Time: 12/16/21  4:21 PM   Result Value Ref Range    Glucose (POC) 125 (H) 65 - 117 mg/dL    Performed by [address] [place] [address], POC    Collection Time: 12/16/21  7:11 PM   Result Value Ref Range    Glucose (POC) 132 (H) 65 - 117 mg/dL    Performed by Dennis , POC    Collection Time: 12/16/21  7:57 PM   Result Value Ref Range    Glucose (POC) 157 (H) 65 - 117 mg/dL    Performed by [facility]    METABOLIC PANEL, BASIC    Collection Time: 12/17/21  5:17 AM   Result Value Ref Range    Sodium 137 136 - 145 mmol/L    Potassium 4.2 3.5 - 5.1 mmol/L    Chloride 102 97 - 108 mmol/L    CO2 30 21 - 32 mmol/L    Anion gap 5 5 - 15 mmol/L    Glucose 125 (H) 65 - 100 mg/dL    BUN 23 (H) 6 - 20 mg/dL    Creatinine 1.76 (H) 0.70 - 1.30 mg/dL    BUN/Creatinine ratio 13 12 - 20      GFR est AA 48 (L) >60 ml/min/1.73m2    GFR est non-AA 39 (L) >60 ml/min/1.73m2    Calcium 8.4 (L) 8.5 - 10.1 mg/dL   GLUCOSE, POC    Collection Time: 12/17/21  7:53 AM   Result Value Ref Range    Glucose (POC) 111 65 - 117 mg/dL    Performed by [address] [place] [address], POC    Collection Time: 12/17/21 11:30 AM   Result Value Ref Range    Glucose (POC) 136 (H) 65 - 117 mg/dL    Performed by Ingrid Monroy         No intake or output data in the 24 hours ending 12/17/21 4065   Current Shift: No intake/output data recorded. Last 3 Shifts: No intake/output data recorded.   Physical Exam  Vitals and nursing note reviewed. Constitutional:       Appearance: Normal appearance. Cardiovascular:      Rate and Rhythm: Regular rhythm. Pulses: Normal pulses. Heart sounds: Normal heart sounds. Pulmonary:      Effort: Pulmonary effort is normal.      Breath sounds: Normal breath sounds. Abdominal:      General: There is no distension. Palpations: Abdomen is soft. Skin:     General: Skin is warm and dry. Neurological:      General: No focal deficit present. Mental Status: He is alert and oriented to person, place, and time.    Psychiatric:         Mood and Affect: Mood normal.         Behavior: Behavior normal.          Data Review:   Recent Results (from the past 24 hour(s))   GLUCOSE, POC    Collection Time: 12/16/21  2:25 PM   Result Value Ref Range    Glucose (POC) 142 (H) 65 - 117 mg/dL    Performed by Julia Meraz    GLUCOSE, POC    Collection Time: 12/16/21  4:21 PM   Result Value Ref Range    Glucose (POC) 125 (H) 65 - 117 mg/dL    Performed by [address] [place] [address], POC    Collection Time: 12/16/21  7:11 PM   Result Value Ref Range    Glucose (POC) 132 (H) 65 - 117 mg/dL    Performed by Davene Heimlich    GLUCOSE, POC    Collection Time: 12/16/21  7:57 PM   Result Value Ref Range    Glucose (POC) 157 (H) 65 - 117 mg/dL    Performed by Kelsey Griffin    METABOLIC PANEL, BASIC    Collection Time: 12/17/21  5:17 AM   Result Value Ref Range    Sodium 137 136 - 145 mmol/L    Potassium 4.2 3.5 - 5.1 mmol/L    Chloride 102 97 - 108 mmol/L    CO2 30 21 - 32 mmol/L    Anion gap 5 5 - 15 mmol/L    Glucose 125 (H) 65 - 100 mg/dL    BUN 23 (H) 6 - 20 mg/dL    Creatinine 1.76 (H) 0.70 - 1.30 mg/dL    BUN/Creatinine ratio 13 12 - 20      GFR est AA 48 (L) >60 ml/min/1.73m2    GFR est non-AA 39 (L) >60 ml/min/1.73m2    Calcium 8.4 (L) 8.5 - 10.1 mg/dL   GLUCOSE, POC    Collection Time: 12/17/21  7:53 AM   Result Value Ref Range    Glucose (POC) 111 65 - 117 mg/dL    Performed by [address] [place] [address], POC Collection Time: 12/17/21 11:30 AM   Result Value Ref Range    Glucose (POC) 136 (H) 65 - 117 mg/dL    Performed by Bobby Winston          XR ABD (KUB)   Final Result      XR CHEST PORT   Final Result   No acute findings. XR FLUOROSCOPY UNDER 60 MINUTES   Final Result      CT ABD PELV WO CONT   Final Result   1. 5 x 5 mm obstructing stone in the distal left ureter. Left perinephric   inflammation. Correlate to medical renal disease to include renal dysfunction   and pyelonephritis. 2. Nonobstructing bilateral nephrolithiasis. 3. Thickening versus nondistention of the proximal duodenum. Correlate for any   clinical symptoms. 4. Trace bilateral pleural effusions. 5. Hepatomegaly. 6. Other findings above. XR CHEST PORT   Final Result           Patient Active Problem List   Diagnosis Code    Pyelonephritis N12    FERMIN (acute kidney injury) ([facility] [address] N17.9        DIAGNOSES:  1. FERMIN on CKD, acute kidney injury resolved  2. FERMIN due to left hydronephrosis resolved after left ureteral stent placement , which is improving  3. Nephrolithiasis  4. Hypertension  5. Diabetes  6. Gout    DISCUSSION:   I suspect creatinine is at his baseline   He has been counseled about risk of contrast nephrotoxicity     PLAN:  Since cardiac catheterization is going to be on Monday. Will hold IV fluids and use it as per clinical assessment periprocedure. Thanks for consulting me. Please don't hesitate to contact me if any questions arise of if I can assist in any manner. This dictation was done by dragon, computer voice recognition software. Often unanticipated grammatical, syntax, phones and other interpretive errors are inadvertently transcribed. Please excuse errors that have escaped final proofreading. Please contact me if you suspect dictation or transcription errors.   Dr Viviane Brown  [facility] [address], [address] [facility], [address] [facility]  Cell Phone: [phone]  Office phone: [phone]  Fax: [phone]

## 2021-12-17 NOTE — PROGRESS NOTES
Cardiology Progress Note      12/17/2021 10:38 AM    Admit Date: 12/6/2021    Admit Diagnosis: Pyelonephritis [N12]      Subjective:   Patient seen and examined. He remains chest pain-free. No overnight events.     Visit Vitals  /76 (BP 1 Location: Left upper arm, BP Patient Position: Sitting)   Pulse (!) 56   Temp 97.6 °F (36.4 °C)   Resp 17   Ht 6' 9\" (2.057 m)   Wt 107 kg (236 lb)   SpO2 95%   BMI 25.29 kg/m²     Current Facility-Administered Medications   Medication Dose Route Frequency    levothyroxine (SYNTHROID) tablet 200 mcg  200 mcg Oral ACB    allopurinoL (ZYLOPRIM) tablet 300 mg  300 mg Oral DAILY    hydroCHLOROthiazide (HYDRODIURIL) tablet 25 mg  25 mg Oral DAILY    nystatin (MYCOSTATIN) 100,000 unit/mL oral suspension 500,000 Units  500,000 Units Oral QID    0.9% sodium chloride infusion  50 mL/hr IntraVENous CONTINUOUS    heparin (porcine) injection 5,000 Units  5,000 Units SubCUTAneous Q8H    pantoprazole (PROTONIX) tablet 40 mg  40 mg Oral ACB    tamsulosin (FLOMAX) capsule 0.4 mg  0.4 mg Oral DAILY    atorvastatin (LIPITOR) tablet 80 mg  80 mg Oral DAILY    sodium chloride (NS) flush 5-40 mL  5-40 mL IntraVENous PRN    acetaminophen (TYLENOL) tablet 650 mg  650 mg Oral Q6H PRN    Or    acetaminophen (TYLENOL) suppository 650 mg  650 mg Rectal Q6H PRN    polyethylene glycol (MIRALAX) packet 17 g  17 g Oral DAILY PRN    ondansetron (ZOFRAN ODT) tablet 4 mg  4 mg Oral Q8H PRN    Or    ondansetron (ZOFRAN) injection 4 mg  4 mg IntraVENous Q6H PRN    HYDROmorphone (DILAUDID) syringe 0.5 mg  0.5 mg IntraVENous Q4H PRN    sodium chloride (NS) flush 5-40 mL  5-40 mL IntraVENous Q8H    cefTRIAXone (ROCEPHIN) 2 g in 0.9% sodium chloride (MBP/ADV) 50 mL MBP  2 g IntraVENous ACB    glucose chewable tablet 16 g  4 Tablet Oral PRN    dextrose (D50W) injection syrg 12.5-25 g  25-50 mL IntraVENous PRN    glucagon (GLUCAGEN) injection 1 mg  1 mg IntraMUSCular PRN    insulin lispro (HUMALOG) injection   SubCUTAneous AC&HS    aspirin delayed-release tablet 81 mg  81 mg Oral DAILY         Objective:      Physical Exam:  Visit Vitals  /76 (BP 1 Location: Left upper arm, BP Patient Position: Sitting)   Pulse (!) 56   Temp 97.6 °F (36.4 °C)   Resp 17   Ht 6' 9\" (2.057 m)   Wt 107 kg (236 lb)   SpO2 95%   BMI 25.29 kg/m²     General Appearance:  Well developed, well nourished,alert and oriented x 3, and individual in no acute distress. Ears/Nose/Mouth/Throat:   Hearing grossly normal.         Neck: Supple. Chest:   Lungs clear to auscultation bilaterally. Cardiovascular:  Regular rate and rhythm, S1, S2 normal, no murmur. Abdomen:   Soft, non-tender, bowel sounds are active. Extremities: No edema bilaterally. Skin: Warm and dry.                Data Review:   Labs:    Recent Results (from the past 24 hour(s))   GLUCOSE, POC    Collection Time: 12/16/21  2:25 PM   Result Value Ref Range    Glucose (POC) 142 (H) 65 - 117 mg/dL    Performed by Mckenzie Batista    GLUCOSE, POC    Collection Time: 12/16/21  4:21 PM   Result Value Ref Range    Glucose (POC) 125 (H) 65 - 117 mg/dL    Performed by [address] [place] [address], POC    Collection Time: 12/16/21  7:11 PM   Result Value Ref Range    Glucose (POC) 132 (H) 65 - 117 mg/dL    Performed by Fortunato Monahan    GLUCOSE, POC    Collection Time: 12/16/21  7:57 PM   Result Value Ref Range    Glucose (POC) 157 (H) 65 - 117 mg/dL    Performed by Otoniel Naranjo    METABOLIC PANEL, BASIC    Collection Time: 12/17/21  5:17 AM   Result Value Ref Range    Sodium 137 136 - 145 mmol/L    Potassium 4.2 3.5 - 5.1 mmol/L    Chloride 102 97 - 108 mmol/L    CO2 30 21 - 32 mmol/L    Anion gap 5 5 - 15 mmol/L    Glucose 125 (H) 65 - 100 mg/dL    BUN 23 (H) 6 - 20 mg/dL    Creatinine 1.76 (H) 0.70 - 1.30 mg/dL    BUN/Creatinine ratio 13 12 - 20      GFR est AA 48 (L) >60 ml/min/1.73m2    GFR est non-AA 39 (L) >60 ml/min/1.73m2    Calcium 8.4 (L) 8.5 - 10.1 mg/dL GLUCOSE, POC    Collection Time: 12/17/21  7:53 AM   Result Value Ref Range    Glucose (POC) 111 65 - 117 mg/dL    Performed by Fawn Ariza        Telemetry: normal sinus rhythm      Assessment:   NSTEMI type II  Pyelonephritis status post ureteral stent  Acute kidney injury  Diabetes mellitus  Hypertension  Hyperlipidemia    Plan:   69-year-old male with a past medical history as above who is seen for elevated troponins after undergoing ureteral stent placement.  -Patient has had no clear anginal symptoms but he has significantly elevated troponins which are now downtrending. I reviewed his transthoracic echocardiogram which shows mild LV dysfunction with lateral, anterolateral, inferolateral wall motion abnormality consistent with coronary artery disease in the left circumflex/right coronary artery territory  -Renal function is improving now. -Continue aspirin, high intensity statin. Add beta-blockers as hemodynamically able. Resting heart rate is in the 50s hence I am reluctant to start this yet  -Once his presenting problems have stabilized and renal status has been optimized, he will need cardiac catheterization for further evaluation. I explained this to the patient as well. He understands and agrees. His creatinine continues to improve and is 1.7 today, this may be his baseline  -Will plan for cardiac catheterization on Monday.  Risks/benefits/alternates discussed, patient wishes to proceed.     Thank you for allowing us to participate in the care of your patient

## 2021-12-17 NOTE — PROGRESS NOTES
Problem: Falls - Risk of  Goal: *Absence of Falls  Description: Document Jean Pierre Diaz Fall Risk and appropriate interventions in the flowsheet.   Outcome: Progressing Towards Goal  Note: Fall Risk Interventions:            Medication Interventions: Teach patient to arise slowly    Elimination Interventions: Call light in reach,Urinal in reach              Problem: Patient Education: Go to Patient Education Activity  Goal: Patient/Family Education  Outcome: Progressing Towards Goal

## 2021-12-18 LAB
ANION GAP SERPL CALC-SCNC: 5 MMOL/L (ref 5–15)
BUN SERPL-MCNC: 26 MG/DL (ref 6–20)
BUN/CREAT SERPL: 14 (ref 12–20)
CA-I BLD-MCNC: 8.6 MG/DL (ref 8.5–10.1)
CHLORIDE SERPL-SCNC: 101 MMOL/L (ref 97–108)
CO2 SERPL-SCNC: 31 MMOL/L (ref 21–32)
CREAT SERPL-MCNC: 1.86 MG/DL (ref 0.7–1.3)
GLUCOSE BLD STRIP.AUTO-MCNC: 135 MG/DL (ref 65–117)
GLUCOSE BLD STRIP.AUTO-MCNC: 141 MG/DL (ref 65–117)
GLUCOSE BLD STRIP.AUTO-MCNC: 151 MG/DL (ref 65–117)
GLUCOSE BLD STRIP.AUTO-MCNC: 171 MG/DL (ref 65–117)
GLUCOSE SERPL-MCNC: 129 MG/DL (ref 65–100)
PERFORMED BY, TECHID: ABNORMAL
POTASSIUM SERPL-SCNC: 4 MMOL/L (ref 3.5–5.1)
SODIUM SERPL-SCNC: 137 MMOL/L (ref 136–145)

## 2021-12-18 PROCEDURE — 74011250637 HC RX REV CODE- 250/637: Performed by: INTERNAL MEDICINE

## 2021-12-18 PROCEDURE — 74011250637 HC RX REV CODE- 250/637: Performed by: HOSPITALIST

## 2021-12-18 PROCEDURE — 74011250636 HC RX REV CODE- 250/636: Performed by: INTERNAL MEDICINE

## 2021-12-18 PROCEDURE — 65270000029 HC RM PRIVATE

## 2021-12-18 PROCEDURE — 80048 BASIC METABOLIC PNL TOTAL CA: CPT

## 2021-12-18 PROCEDURE — 82962 GLUCOSE BLOOD TEST: CPT

## 2021-12-18 PROCEDURE — 74011636637 HC RX REV CODE- 636/637: Performed by: INTERNAL MEDICINE

## 2021-12-18 PROCEDURE — 74011000258 HC RX REV CODE- 258: Performed by: INTERNAL MEDICINE

## 2021-12-18 PROCEDURE — 36415 COLL VENOUS BLD VENIPUNCTURE: CPT

## 2021-12-18 RX ADMIN — ASPIRIN 81 MG: 81 TABLET, COATED ORAL at 09:40

## 2021-12-18 RX ADMIN — Medication 10 ML: at 13:27

## 2021-12-18 RX ADMIN — Medication 10 ML: at 05:39

## 2021-12-18 RX ADMIN — ONDANSETRON 4 MG: 2 INJECTION INTRAMUSCULAR; INTRAVENOUS at 09:35

## 2021-12-18 RX ADMIN — HEPARIN SODIUM 5000 UNITS: 5000 INJECTION INTRAVENOUS; SUBCUTANEOUS at 13:23

## 2021-12-18 RX ADMIN — HYDROCHLOROTHIAZIDE 25 MG: 25 TABLET ORAL at 09:41

## 2021-12-18 RX ADMIN — HEPARIN SODIUM 5000 UNITS: 5000 INJECTION INTRAVENOUS; SUBCUTANEOUS at 05:54

## 2021-12-18 RX ADMIN — ATORVASTATIN CALCIUM 80 MG: 40 TABLET, FILM COATED ORAL at 09:41

## 2021-12-18 RX ADMIN — PANTOPRAZOLE SODIUM 40 MG: 40 TABLET, DELAYED RELEASE ORAL at 09:41

## 2021-12-18 RX ADMIN — NYSTATIN 500000 UNITS: 100000 SUSPENSION ORAL at 09:40

## 2021-12-18 RX ADMIN — NYSTATIN 500000 UNITS: 100000 SUSPENSION ORAL at 13:23

## 2021-12-18 RX ADMIN — LOSARTAN POTASSIUM 25 MG: 25 TABLET, FILM COATED ORAL at 09:41

## 2021-12-18 RX ADMIN — HYDROMORPHONE HYDROCHLORIDE 0.5 MG: 1 INJECTION, SOLUTION INTRAMUSCULAR; INTRAVENOUS; SUBCUTANEOUS at 13:23

## 2021-12-18 RX ADMIN — NYSTATIN 500000 UNITS: 100000 SUSPENSION ORAL at 17:47

## 2021-12-18 RX ADMIN — HYDROMORPHONE HYDROCHLORIDE 0.5 MG: 1 INJECTION, SOLUTION INTRAMUSCULAR; INTRAVENOUS; SUBCUTANEOUS at 17:47

## 2021-12-18 RX ADMIN — INSULIN LISPRO 2 UNITS: 100 INJECTION, SOLUTION INTRAVENOUS; SUBCUTANEOUS at 11:24

## 2021-12-18 RX ADMIN — TAMSULOSIN HYDROCHLORIDE 0.4 MG: 0.4 CAPSULE ORAL at 09:40

## 2021-12-18 RX ADMIN — HEPARIN SODIUM 5000 UNITS: 5000 INJECTION INTRAVENOUS; SUBCUTANEOUS at 22:22

## 2021-12-18 RX ADMIN — HYDROMORPHONE HYDROCHLORIDE 0.5 MG: 1 INJECTION, SOLUTION INTRAMUSCULAR; INTRAVENOUS; SUBCUTANEOUS at 09:36

## 2021-12-18 RX ADMIN — CEFTRIAXONE SODIUM 2 G: 2 INJECTION, POWDER, FOR SOLUTION INTRAMUSCULAR; INTRAVENOUS at 09:40

## 2021-12-18 RX ADMIN — Medication 10 ML: at 22:22

## 2021-12-18 RX ADMIN — Medication 10 ML: at 14:42

## 2021-12-18 RX ADMIN — LEVOTHYROXINE SODIUM 200 MCG: 0.1 TABLET ORAL at 09:40

## 2021-12-18 RX ADMIN — ALLOPURINOL 300 MG: 300 TABLET ORAL at 09:40

## 2021-12-18 RX ADMIN — NYSTATIN 500000 UNITS: 100000 SUSPENSION ORAL at 22:22

## 2021-12-18 NOTE — PROGRESS NOTES
Spiritual Care Assessment/Progress Note  [facility]      NAME: Sridhar Allan      MRN: [id number]  AGE: 59 y.o.  SEX: male  [employer] Affiliation: No Mosque   Language: English     12/18/2021     Total Time (in minutes): 30     Spiritual Assessment begun in [address] through conversation with:         [x]Patient        [] Family    [] Friend(s)        Reason for Consult: Initial/Spiritual assessment, patient floor     Spiritual beliefs: (Please include comment if needed)     [] Identifies with a melissa tradition:         [] Supported by a melissa community:            [] Claims no spiritual orientation:           [] Seeking spiritual identity:                [] Adheres to an individual form of spirituality:           [x] Not able to assess:                           Identified resources for coping:      [] Prayer                               [] Music                  [] Guided Imagery     [] Family/friends                 [] Pet visits     [] Devotional reading                         [x] Unknown     [] Other:                                              Interventions offered during this visit: (See comments for more details)    Patient Interventions: Affirmation of emotions/emotional suffering,Affirmation of melissa,Catharsis/review of pertinent events in supportive environment,Coping skills reviewed/reinforced,Initial/Spiritual assessment, patient floor           Plan of Care:     [] Support spiritual and/or cultural needs    [] Support AMD and/or advance care planning process      [] Support grieving process   [] Coordinate Rites and/or Rituals    [] Coordination with community clergy   [] No spiritual needs identified at this time   [] Detailed Plan of Care below (See Comments)  [] Make referral to Music Therapy  [] Make referral to Pet Therapy     [] Make referral to Addiction services  [] Make referral to [facility]  [] Make referral to Spiritual Care Partner  [] No future visits requested        [x] Contact Spiritual Care for further referrals     Comments: The purpose of the visit was to do a spiritual assessment on the patient. The patient mentioned that he was encouraged about his progress. He shared that he appreciates the care of the med-team. The  provided the ministry of presence and the comfort of spiritual care. [address] [facility] Samira Weaver.    can be reached by calling the  at [facility]  [phone]

## 2021-12-18 NOTE — PROGRESS NOTES
Hospitalist Progress Note    Subjective:   Daily Progress Note: 12/18/2021 2:13 PM    Hospital Course:  73-year-old male with history of hypertension, diabetes and hypothyroidism presented to ED on 12/6 with left flank pain associated with fever/chills along with nausea/vomiting. CT showed 5 mm obstructing stone in the distal left ureter with left perinephric inflammation. Patient was started on ceftriaxone for sepsis, admitted to ICU, underwent cystoscopy with left ureteral stent on 12/6. Patient was also however found to have elevated troponin, cardiology on board. Plan is cardiac catheterization on Monday. Subjective:  Patient reports headache and nausea today. Is having Bowel movement. Last BM yesterday.   Cr 1.8 today    Current Facility-Administered Medications   Medication Dose Route Frequency    losartan (COZAAR) tablet 25 mg  25 mg Oral DAILY    levothyroxine (SYNTHROID) tablet 200 mcg  200 mcg Oral ACB    allopurinoL (ZYLOPRIM) tablet 300 mg  300 mg Oral DAILY    hydroCHLOROthiazide (HYDRODIURIL) tablet 25 mg  25 mg Oral DAILY    nystatin (MYCOSTATIN) 100,000 unit/mL oral suspension 500,000 Units  500,000 Units Oral QID    heparin (porcine) injection 5,000 Units  5,000 Units SubCUTAneous Q8H    pantoprazole (PROTONIX) tablet 40 mg  40 mg Oral ACB    tamsulosin (FLOMAX) capsule 0.4 mg  0.4 mg Oral DAILY    atorvastatin (LIPITOR) tablet 80 mg  80 mg Oral DAILY    sodium chloride (NS) flush 5-40 mL  5-40 mL IntraVENous PRN    acetaminophen (TYLENOL) tablet 650 mg  650 mg Oral Q6H PRN    Or    acetaminophen (TYLENOL) suppository 650 mg  650 mg Rectal Q6H PRN    polyethylene glycol (MIRALAX) packet 17 g  17 g Oral DAILY PRN    ondansetron (ZOFRAN ODT) tablet 4 mg  4 mg Oral Q8H PRN    Or    ondansetron (ZOFRAN) injection 4 mg  4 mg IntraVENous Q6H PRN    HYDROmorphone (DILAUDID) syringe 0.5 mg  0.5 mg IntraVENous Q4H PRN    sodium chloride (NS) flush 5-40 mL  5-40 mL IntraVENous Q8H    cefTRIAXone (ROCEPHIN) 2 g in 0.9% sodium chloride (MBP/ADV) 50 mL MBP  2 g IntraVENous ACB    glucose chewable tablet 16 g  4 Tablet Oral PRN    dextrose (D50W) injection syrg 12.5-25 g  25-50 mL IntraVENous PRN    glucagon (GLUCAGEN) injection 1 mg  1 mg IntraMUSCular PRN    insulin lispro (HUMALOG) injection   SubCUTAneous AC&HS    aspirin delayed-release tablet 81 mg  81 mg Oral DAILY        Review of Systems  Constitutional: No fevers, No chills, No sweats, No fatigue, No Weakness  Eyes: No redness  Ears, nose, mouth, throat, and face: No nasal congestion, No sore throat, No voice change  Respiratory: No Shortness of Breath, No cough, No wheezing  Cardiovascular: No chest pain, No palpitations, No extremity edema  Gastrointestinal: has nausea, No vomiting, No diarrhea, No abdominal pain  Genitourinary: No frequency, No dysuria, No hematuria  Integument/breast: No skin lesion(s)   Neurological: No Confusion, No headaches, No dizziness      Objective:     Visit Vitals  /82 (BP 1 Location: Left upper arm, BP Patient Position: At rest;Supine)   Pulse 67   Temp 98 °F (36.7 °C)   Resp 18   Ht 6' 9\" (2.057 m)   Wt 107 kg (236 lb)   SpO2 93%   BMI 25.29 kg/m²    O2 Flow Rate (L/min): 3 l/min O2 Device: None (Room air)    Temp (24hrs), Av.8 °F (36.6 °C), Min:97.5 °F (36.4 °C), Max:98.1 °F (36.7 °C)      No intake/output data recorded. No intake/output data recorded. PHYSICAL EXAM:  Constitutional: No acute distress  Skin: Extremities and face reveal no rashes. HEENT: Sclerae anicteric. Extra-occular muscles are intact. No oral ulcers. The neck is supple and no masses. Cardiovascular: Regular rate and rhythm. Respiratory:  Clear breath sounds bilaterally with no wheezes, rales, or rhonchi. GI: Abdomen nondistended, soft, and nontender. Normal active bowel sounds. Musculoskeletal: No pitting edema of the lower legs. Able to move all ext  Neurological:  Patient is alert and oriented.  Cranial nerves II-XII grossly intact  Psychiatric: Mood appears appropriate       Data Review    Recent Results (from the past 24 hour(s))   GLUCOSE, POC    Collection Time: 12/17/21 11:30 AM   Result Value Ref Range    Glucose (POC) 136 (H) 65 - 117 mg/dL    Performed by Celeste Roman    GLUCOSE, POC    Collection Time: 12/17/21  2:29 PM   Result Value Ref Range    Glucose (POC) 147 (H) 65 - 117 mg/dL    Performed by Geroldwilliam Purl    GLUCOSE, POC    Collection Time: 12/17/21  9:58 PM   Result Value Ref Range    Glucose (POC) 166 (H) 65 - 117 mg/dL    Performed by [id number], BASIC    Collection Time: 12/18/21  6:04 AM   Result Value Ref Range    Sodium 137 136 - 145 mmol/L    Potassium 4.0 3.5 - 5.1 mmol/L    Chloride 101 97 - 108 mmol/L    CO2 31 21 - 32 mmol/L    Anion gap 5 5 - 15 mmol/L    Glucose 129 (H) 65 - 100 mg/dL    BUN 26 (H) 6 - 20 mg/dL    Creatinine 1.86 (H) 0.70 - 1.30 mg/dL    BUN/Creatinine ratio 14 12 - 20      GFR est AA 45 (L) >60 ml/min/1.73m2    GFR est non-AA 37 (L) >60 ml/min/1.73m2    Calcium 8.6 8.5 - 10.1 mg/dL   GLUCOSE, POC    Collection Time: 12/18/21  8:40 AM   Result Value Ref Range    Glucose (POC) 135 (H) 65 - 117 mg/dL    Performed by Anne Yip / Plan:  complicated UTI status pyelonephritis  Status post cystoscopy, left ureteral stent placement  Continue ceftriaxone for 14 days(12/7-12/20)  Appreciate urology consult    NSTEMI  With evidence of wall motion abnormality  Appreciate cardiology consult  Plan is for cardiac catheterization on Monday  Continue aspirin  Status post heparin drip  Continue statin    Right-sided nephrolithiasis  No urgent need for stent placement at this time as per urology    Oral thrush  Continue nystatin    Acute kidney injury-unclear baseline  Likely multifactorial-prerenal versus renal versus postrenal  Creatinine has plateued to 4.3-7.7  Appreciate nephrology consult    25.0 - 29.9 Overweight / Body mass index is 25.29 kg/m². Code status: Full  Prophylaxis: heparin sc  Recommended Disposition: Home w/Family       Time spent 35 minutes involving direct patient care as well as reviewing patient's labs and coordination of care with nursing staff     Care Plan discussed with: Patient/Family/RN/Case Management        Total time spent with patient: 35 minutes.

## 2021-12-19 LAB
ANION GAP SERPL CALC-SCNC: 5 MMOL/L (ref 5–15)
BUN SERPL-MCNC: 26 MG/DL (ref 6–20)
BUN/CREAT SERPL: 13 (ref 12–20)
CA-I BLD-MCNC: 8.8 MG/DL (ref 8.5–10.1)
CHLORIDE SERPL-SCNC: 100 MMOL/L (ref 97–108)
CO2 SERPL-SCNC: 32 MMOL/L (ref 21–32)
CREAT SERPL-MCNC: 1.99 MG/DL (ref 0.7–1.3)
GLUCOSE BLD STRIP.AUTO-MCNC: 110 MG/DL (ref 65–117)
GLUCOSE BLD STRIP.AUTO-MCNC: 119 MG/DL (ref 65–117)
GLUCOSE BLD STRIP.AUTO-MCNC: 135 MG/DL (ref 65–117)
GLUCOSE BLD STRIP.AUTO-MCNC: 140 MG/DL (ref 65–117)
GLUCOSE SERPL-MCNC: 156 MG/DL (ref 65–100)
PERFORMED BY, TECHID: ABNORMAL
PERFORMED BY, TECHID: NORMAL
POTASSIUM SERPL-SCNC: 4 MMOL/L (ref 3.5–5.1)
SODIUM SERPL-SCNC: 137 MMOL/L (ref 136–145)

## 2021-12-19 PROCEDURE — 82962 GLUCOSE BLOOD TEST: CPT

## 2021-12-19 PROCEDURE — 74011250636 HC RX REV CODE- 250/636: Performed by: INTERNAL MEDICINE

## 2021-12-19 PROCEDURE — 74011250637 HC RX REV CODE- 250/637: Performed by: INTERNAL MEDICINE

## 2021-12-19 PROCEDURE — 36415 COLL VENOUS BLD VENIPUNCTURE: CPT

## 2021-12-19 PROCEDURE — 65270000029 HC RM PRIVATE

## 2021-12-19 PROCEDURE — 74011000258 HC RX REV CODE- 258: Performed by: INTERNAL MEDICINE

## 2021-12-19 PROCEDURE — 74011250637 HC RX REV CODE- 250/637: Performed by: HOSPITALIST

## 2021-12-19 PROCEDURE — 80048 BASIC METABOLIC PNL TOTAL CA: CPT

## 2021-12-19 PROCEDURE — 74011636637 HC RX REV CODE- 636/637: Performed by: INTERNAL MEDICINE

## 2021-12-19 RX ORDER — SODIUM CHLORIDE 9 MG/ML
75 INJECTION, SOLUTION INTRAVENOUS CONTINUOUS
Status: DISCONTINUED | OUTPATIENT
Start: 2021-12-19 | End: 2021-12-21 | Stop reason: HOSPADM

## 2021-12-19 RX ADMIN — NYSTATIN 500000 UNITS: 100000 SUSPENSION ORAL at 12:40

## 2021-12-19 RX ADMIN — HEPARIN SODIUM 5000 UNITS: 5000 INJECTION INTRAVENOUS; SUBCUTANEOUS at 12:40

## 2021-12-19 RX ADMIN — ONDANSETRON 4 MG: 2 INJECTION INTRAMUSCULAR; INTRAVENOUS at 06:44

## 2021-12-19 RX ADMIN — CEFTRIAXONE SODIUM 2 G: 2 INJECTION, POWDER, FOR SOLUTION INTRAMUSCULAR; INTRAVENOUS at 08:03

## 2021-12-19 RX ADMIN — NYSTATIN 500000 UNITS: 100000 SUSPENSION ORAL at 18:20

## 2021-12-19 RX ADMIN — LEVOTHYROXINE SODIUM 200 MCG: 0.1 TABLET ORAL at 08:02

## 2021-12-19 RX ADMIN — HYDROMORPHONE HYDROCHLORIDE 0.5 MG: 1 INJECTION, SOLUTION INTRAMUSCULAR; INTRAVENOUS; SUBCUTANEOUS at 19:38

## 2021-12-19 RX ADMIN — HEPARIN SODIUM 5000 UNITS: 5000 INJECTION INTRAVENOUS; SUBCUTANEOUS at 06:33

## 2021-12-19 RX ADMIN — NYSTATIN 500000 UNITS: 100000 SUSPENSION ORAL at 08:02

## 2021-12-19 RX ADMIN — ATORVASTATIN CALCIUM 80 MG: 40 TABLET, FILM COATED ORAL at 08:02

## 2021-12-19 RX ADMIN — NYSTATIN 500000 UNITS: 100000 SUSPENSION ORAL at 22:07

## 2021-12-19 RX ADMIN — ASPIRIN 81 MG: 81 TABLET, COATED ORAL at 08:02

## 2021-12-19 RX ADMIN — Medication 10 ML: at 06:34

## 2021-12-19 RX ADMIN — PANTOPRAZOLE SODIUM 40 MG: 40 TABLET, DELAYED RELEASE ORAL at 08:02

## 2021-12-19 RX ADMIN — HYDROMORPHONE HYDROCHLORIDE 0.5 MG: 1 INJECTION, SOLUTION INTRAMUSCULAR; INTRAVENOUS; SUBCUTANEOUS at 06:42

## 2021-12-19 RX ADMIN — ALLOPURINOL 300 MG: 300 TABLET ORAL at 08:02

## 2021-12-19 RX ADMIN — TAMSULOSIN HYDROCHLORIDE 0.4 MG: 0.4 CAPSULE ORAL at 08:02

## 2021-12-19 RX ADMIN — Medication 10 ML: at 14:00

## 2021-12-19 RX ADMIN — HYDROCHLOROTHIAZIDE 25 MG: 25 TABLET ORAL at 08:02

## 2021-12-19 RX ADMIN — ONDANSETRON 4 MG: 4 TABLET, ORALLY DISINTEGRATING ORAL at 19:38

## 2021-12-19 RX ADMIN — HEPARIN SODIUM 5000 UNITS: 5000 INJECTION INTRAVENOUS; SUBCUTANEOUS at 22:08

## 2021-12-19 RX ADMIN — Medication 10 ML: at 22:08

## 2021-12-19 RX ADMIN — INSULIN LISPRO 2 UNITS: 100 INJECTION, SOLUTION INTRAVENOUS; SUBCUTANEOUS at 11:30

## 2021-12-19 RX ADMIN — SODIUM CHLORIDE 75 ML/HR: 9 INJECTION, SOLUTION INTRAVENOUS at 14:57

## 2021-12-19 RX ADMIN — LOSARTAN POTASSIUM 25 MG: 25 TABLET, FILM COATED ORAL at 08:02

## 2021-12-19 NOTE — PROGRESS NOTES
Subjective   HISTORY OF PRESENTING ILLNESS   Patient was seen and examined  States he is constipated; otherwise no new complaints    Past Medical History:   Diagnosis Date    Diabetes ([facility] [address]     Gout     Hypertension     Hypothyroidism     Prostate cancer ([facility] [address]     Renal stones       Past Surgical History:   Procedure Laterality Date    HX PROSTATE SURGERY      HX UROLOGICAL  12/06/2021    Cystoscopy,    HX UROLOGICAL  12/06/2021     left retrograde pyelogram      HX UROLOGICAL  12/06/2021    ureteral stent placement    HX UROLOGICAL  12/06/2021    direct vision internal urethrotomy     History reviewed. No pertinent family history.    Social History     Tobacco Use    Smoking status: Never Smoker    Smokeless tobacco: Never Used   Substance Use Topics    Alcohol use: Not Currently       Current Facility-Administered Medications   Medication Dose Route Frequency Provider Last Rate Last Admin    0.9% sodium chloride infusion  75 mL/hr IntraVENous CONTINUOUS Martina Fernandez MD 75 mL/hr at 12/19/21 1457 75 mL/hr at 12/19/21 1457    losartan (COZAAR) tablet 25 mg  25 mg Oral DAILY Tree Kim MD   25 mg at 12/19/21 0802    levothyroxine (SYNTHROID) tablet 200 mcg  200 mcg Oral ACB Martina Fernandez MD   200 mcg at 12/19/21 0802    allopurinoL (ZYLOPRIM) tablet 300 mg  300 mg Oral DAILY Toshia Mohan MD   300 mg at 12/19/21 0802    hydroCHLOROthiazide (HYDRODIURIL) tablet 25 mg  25 mg Oral DAILY Toshia Mohan MD   25 mg at 12/19/21 0802    nystatin (MYCOSTATIN) 100,000 unit/mL oral suspension 500,000 Units  500,000 Units Oral QID Cristine Pandey MD   500,000 Units at 12/19/21 1240    heparin (porcine) injection 5,000 Units  5,000 Units SubCUTAneous Nereyda Cobb MD   5,000 Units at 12/19/21 1240    pantoprazole (PROTONIX) tablet 40 mg  40 mg Oral ACB Cristine Pandey MD   40 mg at 12/19/21 0802    tamsulosin (FLOMAX) capsule 0.4 mg  0.4 mg Oral DAILY Skinny Pisano MD   0.4 mg at 12/19/21 0802    atorvastatin (LIPITOR) tablet 80 mg  80 mg Oral DAILY Tori Corbin MD   80 mg at 12/19/21 0802    sodium chloride (NS) flush 5-40 mL  5-40 mL IntraVENous PRN Darnell Evangelista MD   10 mL at 12/18/21 1327    acetaminophen (TYLENOL) tablet 650 mg  650 mg Oral Q6H PRN Darnell Evangelista MD   650 mg at 12/17/21 1945    Or    acetaminophen (TYLENOL) suppository 650 mg  650 mg Rectal Q6H PRN Darnell Evangelista MD        polyethylene glycol [facility]) packet 17 g  17 g Oral DAILY PRN Darnell Evangelista MD        ondansetron [facility] ODT) tablet 4 mg  4 mg Oral Q8H PRN Darnell Evangelista MD   4 mg at 12/14/21 1444    Or    ondansetron (ZOFRAN) injection 4 mg  4 mg IntraVENous Q6H PRN Darnell Evangelista MD   4 mg at 12/19/21 0644    HYDROmorphone (DILAUDID) syringe 0.5 mg  0.5 mg IntraVENous Q4H PRN Darnell Evangelista MD   0.5 mg at 12/19/21 3851    sodium chloride (NS) flush 5-40 mL  5-40 mL IntraVENous Q8H Mita Mujica MD   10 mL at 12/19/21 1400    cefTRIAXone (ROCEPHIN) 2 g in 0.9% sodium chloride (MBP/ADV) 50 mL MBP  2 g IntraVENous ACB Darnell Evangelista  mL/hr at 12/19/21 0803 2 g at 12/19/21 0803    glucose chewable tablet 16 g  4 Tablet Oral PRN Darnell Evangelista MD        dextrose (D50W) injection syrg 12.5-25 g  25-50 mL IntraVENous PRN Darnell Evangelista MD        glucagon [facility]) injection 1 mg  1 mg IntraMUSCular PRN Darnell Evangelista MD        insulin lispro (HUMALOG) injection   SubCUTAneous AC&HS Darnell Evangelista MD   2 Units at 12/19/21 1130    aspirin delayed-release tablet 81 mg  81 mg Oral DAILY Radha Cid MD   81 mg at 12/19/21 0802        Review of Systems   Constitutional: Negative for chills and fever. HENT: Negative for congestion and trouble swallowing. Respiratory: Negative for shortness of breath and wheezing. Cardiovascular: Negative for chest pain and leg swelling. Gastrointestinal: Positive for constipation. Negative for abdominal pain. Genitourinary: Negative for difficulty urinating, dysuria and hematuria. Musculoskeletal: Negative. Neurological: Negative. Psychiatric/Behavioral: Negative for behavioral problems and confusion. All other systems reviewed and are negative.       Objective     Vital signs for last 24 hours:  Visit Vitals  /71 (BP 1 Location: Left upper arm, BP Patient Position: At rest)   Pulse 62   Temp 98 °F (36.7 °C)   Resp 16   Ht 6' 9\" (2.057 m)   Wt 107 kg (236 lb)   SpO2 91%   BMI 25.29 kg/m²           Recent Results (from the past 24 hour(s))   GLUCOSE, POC    Collection Time: 12/18/21  5:38 PM   Result Value Ref Range    Glucose (POC) 151 (H) 65 - 117 mg/dL    Performed by Zoila Boles [facility])    GLUCOSE, POC    Collection Time: 12/18/21 10:06 PM   Result Value Ref Range    Glucose (POC) 171 (H) 65 - 117 mg/dL    Performed by [address] [place] [address], POC    Collection Time: 12/19/21  8:07 AM   Result Value Ref Range    Glucose (POC) 119 (H) 65 - 117 mg/dL    Performed by [address] [place] [address], BASIC    Collection Time: 12/19/21 11:51 AM   Result Value Ref Range    Sodium 137 136 - 145 mmol/L    Potassium 4.0 3.5 - 5.1 mmol/L    Chloride 100 97 - 108 mmol/L    CO2 32 21 - 32 mmol/L    Anion gap 5 5 - 15 mmol/L    Glucose 156 (H) 65 - 100 mg/dL    BUN 26 (H) 6 - 20 mg/dL    Creatinine 1.99 (H) 0.70 - 1.30 mg/dL    BUN/Creatinine ratio 13 12 - 20      GFR est AA 41 (L) >60 ml/min/1.73m2    GFR est non-AA 34 (L) >60 ml/min/1.73m2    Calcium 8.8 8.5 - 10.1 mg/dL   GLUCOSE, POC    Collection Time: 12/19/21 12:24 PM   Result Value Ref Range    Glucose (POC) 140 (H) 65 - 117 mg/dL    Performed by Domenico Renteria, POC    Collection Time: 12/19/21  4:10 PM   Result Value Ref Range    Glucose (POC) 110 65 - 117 mg/dL    Performed by Cristina Steward RN         No intake or output data in the 24 hours ending 12/19/21 9280   Current Shift: No intake/output data recorded. Last 3 Shifts: 12/17 1901 - 12/19 0700  In: -   Out: 300 [Urine:300]  Physical Exam  Vitals and nursing note reviewed. Constitutional:       Appearance: Normal appearance. Cardiovascular:      Rate and Rhythm: Regular rhythm. Pulses: Normal pulses. Heart sounds: Normal heart sounds. Pulmonary:      Effort: Pulmonary effort is normal.      Breath sounds: Normal breath sounds. Abdominal:      General: There is no distension. Palpations: Abdomen is soft. Skin:     General: Skin is warm and dry. Neurological:      General: No focal deficit present. Mental Status: He is alert and oriented to person, place, and time.    Psychiatric:         Mood and Affect: Mood normal.         Behavior: Behavior normal.          Data Review:   Recent Results (from the past 24 hour(s))   GLUCOSE, POC    Collection Time: 12/18/21  5:38 PM   Result Value Ref Range    Glucose (POC) 151 (H) 65 - 117 mg/dL    Performed by [place] PACHECO [place])    GLUCOSE, POC    Collection Time: 12/18/21 10:06 PM   Result Value Ref Range    Glucose (POC) 171 (H) 65 - 117 mg/dL    Performed by [address] [place] [address], POC    Collection Time: 12/19/21  8:07 AM   Result Value Ref Range    Glucose (POC) 119 (H) 65 - 117 mg/dL    Performed by [id number], BASIC    Collection Time: 12/19/21 11:51 AM   Result Value Ref Range    Sodium 137 136 - 145 mmol/L    Potassium 4.0 3.5 - 5.1 mmol/L    Chloride 100 97 - 108 mmol/L    CO2 32 21 - 32 mmol/L    Anion gap 5 5 - 15 mmol/L    Glucose 156 (H) 65 - 100 mg/dL    BUN 26 (H) 6 - 20 mg/dL    Creatinine 1.99 (H) 0.70 - 1.30 mg/dL    BUN/Creatinine ratio 13 12 - 20      GFR est AA 41 (L) >60 ml/min/1.73m2    GFR est non-AA 34 (L) >60 ml/min/1.73m2    Calcium 8.8 8.5 - 10.1 mg/dL   GLUCOSE, POC    Collection Time: 12/19/21 12:24 PM   Result Value Ref Range    Glucose (POC) 140 (H) 65 - 117 mg/dL    Performed by Niko Novoa, POC    Collection Time: 12/19/21  4:10 PM   Result Value Ref Range    Glucose (POC) 110 65 - 117 mg/dL    Performed by Luis Mccann RN          XR ABD (KUB)   Final Result      XR CHEST PORT   Final Result   No acute findings. XR FLUOROSCOPY UNDER 60 MINUTES   Final Result      CT ABD PELV WO CONT   Final Result   1. 5 x 5 mm obstructing stone in the distal left ureter. Left perinephric   inflammation. Correlate to medical renal disease to include renal dysfunction   and pyelonephritis. 2. Nonobstructing bilateral nephrolithiasis. 3. Thickening versus nondistention of the proximal duodenum. Correlate for any   clinical symptoms. 4. Trace bilateral pleural effusions. 5. Hepatomegaly. 6. Other findings above. XR CHEST PORT   Final Result           Patient Active Problem List   Diagnosis Code    Pyelonephritis N12    FERMIN (acute kidney injury) ([facility] [address] N17.9        DIAGNOSES:  1. FERMIN on CKD, acute kidney injury resolved  2. FERMIN due to left hydronephrosis resolved after left ureteral stent placement , which is improving  3. Nephrolithiasis  4. Hypertension  5. Diabetes  6. Gout  7. Constipation    DISCUSSION:   Creatinine has been between 1.8-2   Agree with IV fluids   He has been counseled about risk of contrast nephrotoxicity     PLAN:  Would agree with IV fluids periprocedure-normal saline at 75 mill we will do. Treat for constipation today    Thanks for consulting me. Please don't hesitate to contact me if any questions arise of if I can assist in any manner. This dictation was done by dragon, computer voice recognition software. Often unanticipated grammatical, syntax, phones and other interpretive errors are inadvertently transcribed. Please excuse errors that have escaped final proofreading. Please contact me if you suspect dictation or transcription errors.   Dr Neno Young  [address], [address], [address] [facility]  Cell Phone: [phone]  Office phone: [phone]  Fax: [phone]

## 2021-12-19 NOTE — PROGRESS NOTES
Hospitalist Progress Note    Subjective:   Daily Progress Note: 12/19/2021 2:13 PM    Hospital Course:  25-year-old male with history of hypertension, diabetes and hypothyroidism presented to ED on 12/6 with left flank pain associated with fever/chills along with nausea/vomiting. CT showed 5 mm obstructing stone in the distal left ureter with left perinephric inflammation. Patient was started on ceftriaxone for sepsis, admitted to ICU, underwent cystoscopy with left ureteral stent on 12/6. Patient was also however found to have elevated troponin, cardiology on board. Plan is cardiac catheterization on Monday.     Subjective:    Cr 1.99 today    Current Facility-Administered Medications   Medication Dose Route Frequency    0.9% sodium chloride infusion  75 mL/hr IntraVENous CONTINUOUS    losartan (COZAAR) tablet 25 mg  25 mg Oral DAILY    levothyroxine (SYNTHROID) tablet 200 mcg  200 mcg Oral ACB    allopurinoL (ZYLOPRIM) tablet 300 mg  300 mg Oral DAILY    hydroCHLOROthiazide (HYDRODIURIL) tablet 25 mg  25 mg Oral DAILY    nystatin (MYCOSTATIN) 100,000 unit/mL oral suspension 500,000 Units  500,000 Units Oral QID    heparin (porcine) injection 5,000 Units  5,000 Units SubCUTAneous Q8H    pantoprazole (PROTONIX) tablet 40 mg  40 mg Oral ACB    tamsulosin (FLOMAX) capsule 0.4 mg  0.4 mg Oral DAILY    atorvastatin (LIPITOR) tablet 80 mg  80 mg Oral DAILY    sodium chloride (NS) flush 5-40 mL  5-40 mL IntraVENous PRN    acetaminophen (TYLENOL) tablet 650 mg  650 mg Oral Q6H PRN    Or    acetaminophen (TYLENOL) suppository 650 mg  650 mg Rectal Q6H PRN    polyethylene glycol (MIRALAX) packet 17 g  17 g Oral DAILY PRN    ondansetron (ZOFRAN ODT) tablet 4 mg  4 mg Oral Q8H PRN    Or    ondansetron (ZOFRAN) injection 4 mg  4 mg IntraVENous Q6H PRN    HYDROmorphone (DILAUDID) syringe 0.5 mg  0.5 mg IntraVENous Q4H PRN    sodium chloride (NS) flush 5-40 mL  5-40 mL IntraVENous Q8H    cefTRIAXone (ROCEPHIN) 2 g in 0.9% sodium chloride (MBP/ADV) 50 mL MBP  2 g IntraVENous ACB    glucose chewable tablet 16 g  4 Tablet Oral PRN    dextrose (D50W) injection syrg 12.5-25 g  25-50 mL IntraVENous PRN    glucagon (GLUCAGEN) injection 1 mg  1 mg IntraMUSCular PRN    insulin lispro (HUMALOG) injection   SubCUTAneous AC&HS    aspirin delayed-release tablet 81 mg  81 mg Oral DAILY        Review of Systems  Constitutional: No fevers, No chills, No sweats, No fatigue, No Weakness  Eyes: No redness  Ears, nose, mouth, throat, and face: No nasal congestion, No sore throat, No voice change  Respiratory: No Shortness of Breath, No cough, No wheezing  Cardiovascular: No chest pain, No palpitations, No extremity edema  Gastrointestinal: has nausea, No vomiting, No diarrhea, No abdominal pain  Genitourinary: No frequency, No dysuria, No hematuria  Integument/breast: No skin lesion(s)   Neurological: No Confusion, No headaches, No dizziness      Objective:     Visit Vitals  /71 (BP 1 Location: Left upper arm, BP Patient Position: At rest)   Pulse 62   Temp 98 °F (36.7 °C)   Resp 16   Ht 6' 9\" (2.057 m)   Wt 107 kg (236 lb)   SpO2 91%   BMI 25.29 kg/m²    O2 Flow Rate (L/min): 3 l/min O2 Device: None (Room air)    Temp (24hrs), Av °F (36.7 °C), Min:97.9 °F (36.6 °C), Max:98.1 °F (36.7 °C)      No intake/output data recorded.  1901 -  0700  In: -   Out: 300 [Urine:300]    PHYSICAL EXAM:  Constitutional: No acute distress  Skin: Extremities and face reveal no rashes. HEENT: Sclerae anicteric. Extra-occular muscles are intact. No oral ulcers. The neck is supple and no masses. Cardiovascular: Regular rate and rhythm. Respiratory:  Clear breath sounds bilaterally with no wheezes, rales, or rhonchi. GI: Abdomen nondistended, soft, and nontender. Normal active bowel sounds. Musculoskeletal: No pitting edema of the lower legs. Able to move all ext  Neurological:  Patient is alert and oriented.  Cranial nerves II-XII grossly intact  Psychiatric: Mood appears appropriate       Data Review    Recent Results (from the past 24 hour(s))   GLUCOSE, POC    Collection Time: 12/18/21  5:38 PM   Result Value Ref Range    Glucose (POC) 151 (H) 65 - 117 mg/dL    Performed by Timur PACHECO [place])    GLUCOSE, POC    Collection Time: 12/18/21 10:06 PM   Result Value Ref Range    Glucose (POC) 171 (H) 65 - 117 mg/dL    Performed by [address] [place] [address], POC    Collection Time: 12/19/21  8:07 AM   Result Value Ref Range    Glucose (POC) 119 (H) 65 - 117 mg/dL    Performed by Callie Freed, BASIC    Collection Time: 12/19/21 11:51 AM   Result Value Ref Range    Sodium 137 136 - 145 mmol/L    Potassium 4.0 3.5 - 5.1 mmol/L    Chloride 100 97 - 108 mmol/L    CO2 32 21 - 32 mmol/L    Anion gap 5 5 - 15 mmol/L    Glucose 156 (H) 65 - 100 mg/dL    BUN 26 (H) 6 - 20 mg/dL    Creatinine 1.99 (H) 0.70 - 1.30 mg/dL    BUN/Creatinine ratio 13 12 - 20      GFR est AA 41 (L) >60 ml/min/1.73m2    GFR est non-AA 34 (L) >60 ml/min/1.73m2    Calcium 8.8 8.5 - 10.1 mg/dL   GLUCOSE, POC    Collection Time: 12/19/21 12:24 PM   Result Value Ref Range    Glucose (POC) 140 (H) 65 - 117 mg/dL    Performed by Natasha Odom / Plan:  complicated UTI status pyelonephritis  Status post cystoscopy, left ureteral stent placement  Continue ceftriaxone for 14 days(12/7-12/20)  Appreciate urology consult    NSTEMI  With evidence of wall motion abnormality  Appreciate cardiology consult  Plan is for cardiac catheterization on Monday  Continue aspirin  Status post heparin drip  Continue statin    Right-sided nephrolithiasis  No urgent need for stent placement at this time as per urology    Oral thrush  Continue nystatin    Acute kidney injury-unclear baseline  Likely multifactorial-prerenal versus renal versus postrenal  Creatinine went down upto 1.76, now uptrended to 1.99  Will start IV hydration.   Appreciate nephrology consult    25.0 - 29.9 Overweight / Body mass index is 25.29 kg/m². Code status: Full  Prophylaxis: heparin sc  Recommended Disposition: Home w/Family       Time spent 35 minutes involving direct patient care as well as reviewing patient's labs and coordination of care with nursing staff     Care Plan discussed with: Patient/Family/RN/Case Management        Total time spent with patient: 35 minutes.

## 2021-12-20 LAB
ANION GAP SERPL CALC-SCNC: 5 MMOL/L (ref 5–15)
BUN SERPL-MCNC: 23 MG/DL (ref 6–20)
BUN/CREAT SERPL: 13 (ref 12–20)
CA-I BLD-MCNC: 8.8 MG/DL (ref 8.5–10.1)
CHLORIDE SERPL-SCNC: 103 MMOL/L (ref 97–108)
CO2 SERPL-SCNC: 30 MMOL/L (ref 21–32)
CREAT SERPL-MCNC: 1.74 MG/DL (ref 0.7–1.3)
GLUCOSE BLD STRIP.AUTO-MCNC: 109 MG/DL (ref 65–117)
GLUCOSE BLD STRIP.AUTO-MCNC: 117 MG/DL (ref 65–117)
GLUCOSE BLD STRIP.AUTO-MCNC: 157 MG/DL (ref 65–117)
GLUCOSE BLD STRIP.AUTO-MCNC: 178 MG/DL (ref 65–117)
GLUCOSE BLD STRIP.AUTO-MCNC: 92 MG/DL (ref 65–117)
GLUCOSE SERPL-MCNC: 108 MG/DL (ref 65–100)
PERFORMED BY, TECHID: ABNORMAL
PERFORMED BY, TECHID: ABNORMAL
PERFORMED BY, TECHID: NORMAL
POTASSIUM SERPL-SCNC: 4.2 MMOL/L (ref 3.5–5.1)
SODIUM SERPL-SCNC: 138 MMOL/L (ref 136–145)

## 2021-12-20 PROCEDURE — 77030041064 HC CATH DX ANGI DXTRTY MEDT -B: Performed by: INTERNAL MEDICINE

## 2021-12-20 PROCEDURE — 74011250637 HC RX REV CODE- 250/637: Performed by: INTERNAL MEDICINE

## 2021-12-20 PROCEDURE — 74011250636 HC RX REV CODE- 250/636: Performed by: INTERNAL MEDICINE

## 2021-12-20 PROCEDURE — 99152 MOD SED SAME PHYS/QHP 5/>YRS: CPT | Performed by: INTERNAL MEDICINE

## 2021-12-20 PROCEDURE — 76210000006 HC OR PH I REC 0.5 TO 1 HR: Performed by: INTERNAL MEDICINE

## 2021-12-20 PROCEDURE — 74011250637 HC RX REV CODE- 250/637: Performed by: HOSPITALIST

## 2021-12-20 PROCEDURE — 74011000258 HC RX REV CODE- 258: Performed by: INTERNAL MEDICINE

## 2021-12-20 PROCEDURE — 4A023N7 MEASUREMENT OF CARDIAC SAMPLING AND PRESSURE, LEFT HEART, PERCUTANEOUS APPROACH: ICD-10-PCS | Performed by: INTERNAL MEDICINE

## 2021-12-20 PROCEDURE — B2111ZZ FLUOROSCOPY OF MULTIPLE CORONARY ARTERIES USING LOW OSMOLAR CONTRAST: ICD-10-PCS | Performed by: INTERNAL MEDICINE

## 2021-12-20 PROCEDURE — C1769 GUIDE WIRE: HCPCS | Performed by: INTERNAL MEDICINE

## 2021-12-20 PROCEDURE — 77030029997 HC DEV COM RDL R BND TELE -B: Performed by: INTERNAL MEDICINE

## 2021-12-20 PROCEDURE — 65270000029 HC RM PRIVATE

## 2021-12-20 PROCEDURE — 77030019698 HC SYR ANGI MDLON MRTM -A: Performed by: INTERNAL MEDICINE

## 2021-12-20 PROCEDURE — 82962 GLUCOSE BLOOD TEST: CPT

## 2021-12-20 PROCEDURE — 74011000636 HC RX REV CODE- 636: Performed by: INTERNAL MEDICINE

## 2021-12-20 PROCEDURE — 80048 BASIC METABOLIC PNL TOTAL CA: CPT

## 2021-12-20 PROCEDURE — 77030040934 HC CATH DIAG DXTERITY MEDT -A: Performed by: INTERNAL MEDICINE

## 2021-12-20 PROCEDURE — C1894 INTRO/SHEATH, NON-LASER: HCPCS | Performed by: INTERNAL MEDICINE

## 2021-12-20 PROCEDURE — 74011000250 HC RX REV CODE- 250: Performed by: INTERNAL MEDICINE

## 2021-12-20 PROCEDURE — 36415 COLL VENOUS BLD VENIPUNCTURE: CPT

## 2021-12-20 PROCEDURE — 93454 CORONARY ARTERY ANGIO S&I: CPT | Performed by: INTERNAL MEDICINE

## 2021-12-20 RX ORDER — SODIUM CHLORIDE 0.9 % (FLUSH) 0.9 %
5-40 SYRINGE (ML) INJECTION EVERY 8 HOURS
Status: DISCONTINUED | OUTPATIENT
Start: 2021-12-20 | End: 2021-12-21 | Stop reason: HOSPADM

## 2021-12-20 RX ORDER — CLOPIDOGREL BISULFATE 75 MG/1
75 TABLET ORAL DAILY
Status: DISCONTINUED | OUTPATIENT
Start: 2021-12-21 | End: 2021-12-21 | Stop reason: HOSPADM

## 2021-12-20 RX ORDER — HEPARIN SODIUM 200 [USP'U]/100ML
INJECTION, SOLUTION INTRAVENOUS
Status: COMPLETED | OUTPATIENT
Start: 2021-12-20 | End: 2021-12-20

## 2021-12-20 RX ORDER — CLOPIDOGREL 300 MG/1
TABLET, FILM COATED ORAL AS NEEDED
Status: DISCONTINUED | OUTPATIENT
Start: 2021-12-20 | End: 2021-12-20 | Stop reason: HOSPADM

## 2021-12-20 RX ORDER — SODIUM CHLORIDE 9 MG/ML
125 INJECTION, SOLUTION INTRAVENOUS CONTINUOUS
Status: DISPENSED | OUTPATIENT
Start: 2021-12-20 | End: 2021-12-20

## 2021-12-20 RX ORDER — MIDAZOLAM HYDROCHLORIDE 1 MG/ML
INJECTION INTRAMUSCULAR; INTRAVENOUS AS NEEDED
Status: DISCONTINUED | OUTPATIENT
Start: 2021-12-20 | End: 2021-12-20 | Stop reason: HOSPADM

## 2021-12-20 RX ORDER — FENTANYL CITRATE 50 UG/ML
INJECTION, SOLUTION INTRAMUSCULAR; INTRAVENOUS AS NEEDED
Status: DISCONTINUED | OUTPATIENT
Start: 2021-12-20 | End: 2021-12-20 | Stop reason: HOSPADM

## 2021-12-20 RX ORDER — CLOPIDOGREL 300 MG/1
300 TABLET, FILM COATED ORAL ONCE
Status: ACTIVE | OUTPATIENT
Start: 2021-12-20 | End: 2021-12-21

## 2021-12-20 RX ORDER — LIDOCAINE HYDROCHLORIDE 10 MG/ML
INJECTION INFILTRATION; PERINEURAL AS NEEDED
Status: DISCONTINUED | OUTPATIENT
Start: 2021-12-20 | End: 2021-12-20 | Stop reason: HOSPADM

## 2021-12-20 RX ORDER — HEPARIN SODIUM 1000 [USP'U]/ML
INJECTION, SOLUTION INTRAVENOUS; SUBCUTANEOUS AS NEEDED
Status: DISCONTINUED | OUTPATIENT
Start: 2021-12-20 | End: 2021-12-20 | Stop reason: HOSPADM

## 2021-12-20 RX ORDER — SODIUM CHLORIDE 0.9 % (FLUSH) 0.9 %
5-40 SYRINGE (ML) INJECTION AS NEEDED
Status: DISCONTINUED | OUTPATIENT
Start: 2021-12-20 | End: 2021-12-21 | Stop reason: HOSPADM

## 2021-12-20 RX ORDER — NITROGLYCERIN 5 MG/ML
INJECTION, SOLUTION INTRAVENOUS AS NEEDED
Status: DISCONTINUED | OUTPATIENT
Start: 2021-12-20 | End: 2021-12-20 | Stop reason: HOSPADM

## 2021-12-20 RX ADMIN — HYDROCHLOROTHIAZIDE 25 MG: 25 TABLET ORAL at 10:29

## 2021-12-20 RX ADMIN — HEPARIN SODIUM 5000 UNITS: 5000 INJECTION INTRAVENOUS; SUBCUTANEOUS at 10:28

## 2021-12-20 RX ADMIN — HYDROMORPHONE HYDROCHLORIDE 0.5 MG: 1 INJECTION, SOLUTION INTRAMUSCULAR; INTRAVENOUS; SUBCUTANEOUS at 01:28

## 2021-12-20 RX ADMIN — Medication 10 ML: at 05:09

## 2021-12-20 RX ADMIN — LOSARTAN POTASSIUM 25 MG: 25 TABLET, FILM COATED ORAL at 10:29

## 2021-12-20 RX ADMIN — ATORVASTATIN CALCIUM 80 MG: 40 TABLET, FILM COATED ORAL at 10:29

## 2021-12-20 RX ADMIN — Medication 10 ML: at 21:24

## 2021-12-20 RX ADMIN — ASPIRIN 81 MG: 81 TABLET, COATED ORAL at 10:29

## 2021-12-20 RX ADMIN — ALLOPURINOL 300 MG: 300 TABLET ORAL at 10:29

## 2021-12-20 RX ADMIN — SODIUM CHLORIDE 75 ML/HR: 9 INJECTION, SOLUTION INTRAVENOUS at 04:48

## 2021-12-20 RX ADMIN — PANTOPRAZOLE SODIUM 40 MG: 40 TABLET, DELAYED RELEASE ORAL at 10:29

## 2021-12-20 RX ADMIN — ONDANSETRON 4 MG: 2 INJECTION INTRAMUSCULAR; INTRAVENOUS at 01:28

## 2021-12-20 RX ADMIN — NYSTATIN 500000 UNITS: 100000 SUSPENSION ORAL at 10:29

## 2021-12-20 RX ADMIN — TAMSULOSIN HYDROCHLORIDE 0.4 MG: 0.4 CAPSULE ORAL at 10:29

## 2021-12-20 RX ADMIN — LEVOTHYROXINE SODIUM 200 MCG: 0.1 TABLET ORAL at 10:29

## 2021-12-20 RX ADMIN — CEFTRIAXONE SODIUM 2 G: 2 INJECTION, POWDER, FOR SOLUTION INTRAMUSCULAR; INTRAVENOUS at 10:36

## 2021-12-20 NOTE — PROGRESS NOTES
Nutrition Assessment     Type and Reason for Visit: RD nutrition re-screen/LOS    Nutrition Recommendations/Plan:   Continue w/ current diet   Monitor and record wts, po intakes   Will continue to follow per protocol. Nutrition Assessment:  Presents to hospital with left flank pains, development over the previous two days. CT showed a 5 mm obstructing stone in the distal left ureter with left perinephric inflammation. He underwent cystoscopy with left ureteral stenting on 12/6. Urology following. Intakes and appetite appear good through admit. Currently NPO for cardiac catheterization that is scheduled for today. Per EMR documentation, consuming >75% of trays. No further nutritional concerns. Malnutrition Assessment:  Malnutrition Status: No malnutrition     Estimated Daily Nutrient Needs:  Energy (kcal):  2,600kcals (25kcal/kg)  Protein (g):  86g (.8g/kg)       Fluid (ml/day):  2,600ml    Nutrition Related Findings:  alert, awake, no reported n/v,c/d nor edema/dysphagia. Labs and Meds reviewed.       Current Nutrition Therapies:  DIET NPO    Anthropometric Measures:  · Height:  6' 9\" (205.7 cm)  · Current Body Wt:  107 kg (236 lb)  · BMI: 25.3    Nutrition Diagnosis:   No nutrition diagnosis at this time     Nutrition Intervention:  Food and/or Nutrient Delivery: Continue current diet  Nutrition Education and Counseling: Education not indicated  Coordination of Nutrition Care: Continue to monitor while inpatient    Goals:  >75% of EENs x 7days, Wt. stability +/-kg, skin integrity, ltyes wnl       Nutrition Monitoring and Evaluation:   Behavioral-Environmental Outcomes: None identified  Food/Nutrient Intake Outcomes: Food and nutrient intake  Physical Signs/Symptoms Outcomes: Weight    Discharge Planning:    Continue current diet     Electronically signed by Saranya Winters on 12/20/2021 at 3:11 PM    Contact Number: [phone]

## 2021-12-20 NOTE — PROGRESS NOTES
Cardiology Progress Note      12/20/2021 10:38 AM    Admit Date: 12/6/2021    Admit Diagnosis: Pyelonephritis [N12]      Subjective:   Patient seen and examined. He remains chest pain-free. No overnight events.     Visit Vitals  /68 (BP 1 Location: Left upper arm, BP Patient Position: Sitting)   Pulse (!) 58   Temp 97.7 °F (36.5 °C)   Resp 19   Ht 6' 9\" (2.057 m)   Wt 107 kg (236 lb)   SpO2 98%   BMI 25.29 kg/m²     Current Facility-Administered Medications   Medication Dose Route Frequency    heparinized saline 2 units/mL infusion    CONTINUOUS    heparinized saline 2 units/mL infusion    CONTINUOUS    heparinized saline 2 units/mL infusion    CONTINUOUS    midazolam (PF) (VERSED) 1 mg/mL injection    PRN    fentaNYL citrate (PF) injection    PRN    lidocaine (XYLOCAINE) 10 mg/mL (1 %) injection    PRN    nitroglycerin injection    PRN    iopamidoL (ISOVUE-370) 76 % injection    PRN    heparin (porcine) 1,000 unit/mL injection    PRN    sodium chloride (NS) flush 5-40 mL  5-40 mL IntraVENous Q8H    sodium chloride (NS) flush 5-40 mL  5-40 mL IntraVENous PRN    0.9% sodium chloride infusion  125 mL/hr IntraVENous CONTINUOUS    clopidogreL (PLAVIX) tablet 300 mg  300 mg Oral ONCE    [START ON 12/21/2021] clopidogreL (PLAVIX) tablet 75 mg  75 mg Oral DAILY    clopidogreL (PLAVIX) tablet    PRN    0.9% sodium chloride infusion  75 mL/hr IntraVENous CONTINUOUS    lactulose (CHRONULAC) 10 gram/15 mL solution 30 mL  20 g Oral Q4H PRN    losartan (COZAAR) tablet 25 mg  25 mg Oral DAILY    levothyroxine (SYNTHROID) tablet 200 mcg  200 mcg Oral ACB    allopurinoL (ZYLOPRIM) tablet 300 mg  300 mg Oral DAILY    hydroCHLOROthiazide (HYDRODIURIL) tablet 25 mg  25 mg Oral DAILY    heparin (porcine) injection 5,000 Units  5,000 Units SubCUTAneous Q8H    pantoprazole (PROTONIX) tablet 40 mg  40 mg Oral ACB    tamsulosin (FLOMAX) capsule 0.4 mg  0.4 mg Oral DAILY    atorvastatin (LIPITOR) tablet 80 mg 80 mg Oral DAILY    sodium chloride (NS) flush 5-40 mL  5-40 mL IntraVENous PRN    acetaminophen (TYLENOL) tablet 650 mg  650 mg Oral Q6H PRN    Or    acetaminophen (TYLENOL) suppository 650 mg  650 mg Rectal Q6H PRN    polyethylene glycol (MIRALAX) packet 17 g  17 g Oral DAILY PRN    ondansetron (ZOFRAN ODT) tablet 4 mg  4 mg Oral Q8H PRN    Or    ondansetron (ZOFRAN) injection 4 mg  4 mg IntraVENous Q6H PRN    HYDROmorphone (DILAUDID) syringe 0.5 mg  0.5 mg IntraVENous Q4H PRN    sodium chloride (NS) flush 5-40 mL  5-40 mL IntraVENous Q8H    cefTRIAXone (ROCEPHIN) 2 g in 0.9% sodium chloride (MBP/ADV) 50 mL MBP  2 g IntraVENous ACB    glucose chewable tablet 16 g  4 Tablet Oral PRN    dextrose (D50W) injection syrg 12.5-25 g  25-50 mL IntraVENous PRN    glucagon (GLUCAGEN) injection 1 mg  1 mg IntraMUSCular PRN    insulin lispro (HUMALOG) injection   SubCUTAneous AC&HS    aspirin delayed-release tablet 81 mg  81 mg Oral DAILY         Objective:      Physical Exam:  Visit Vitals  /68 (BP 1 Location: Left upper arm, BP Patient Position: Sitting)   Pulse (!) 58   Temp 97.7 °F (36.5 °C)   Resp 19   Ht 6' 9\" (2.057 m)   Wt 107 kg (236 lb)   SpO2 98%   BMI 25.29 kg/m²     General Appearance:  Well developed, well nourished,alert and oriented x 3, and individual in no acute distress. Ears/Nose/Mouth/Throat:   Hearing grossly normal.         Neck: Supple. Chest:   Lungs clear to auscultation bilaterally. Cardiovascular:  Regular rate and rhythm, S1, S2 normal, no murmur. Abdomen:   Soft, non-tender, bowel sounds are active. Extremities: No edema bilaterally. Skin: Warm and dry.                Data Review:   Labs:    Recent Results (from the past 24 hour(s))   GLUCOSE, POC    Collection Time: 12/19/21  9:57 PM   Result Value Ref Range    Glucose (POC) 135 (H) 65 - 117 mg/dL    Performed by Nash Pereira, BASIC    Collection Time: 12/20/21  7:58 AM   Result Value Ref Range Sodium 138 136 - 145 mmol/L    Potassium 4.2 3.5 - 5.1 mmol/L    Chloride 103 97 - 108 mmol/L    CO2 30 21 - 32 mmol/L    Anion gap 5 5 - 15 mmol/L    Glucose 108 (H) 65 - 100 mg/dL    BUN 23 (H) 6 - 20 mg/dL    Creatinine 1.74 (H) 0.70 - 1.30 mg/dL    BUN/Creatinine ratio 13 12 - 20      GFR est AA 48 (L) >60 ml/min/1.73m2    GFR est non-AA 40 (L) >60 ml/min/1.73m2    Calcium 8.8 8.5 - 10.1 mg/dL   GLUCOSE, POC    Collection Time: 12/20/21  8:08 AM   Result Value Ref Range    Glucose (POC) 92 65 - 117 mg/dL    Performed by FABIAN Yeh, POC    Collection Time: 12/20/21 11:18 AM   Result Value Ref Range    Glucose (POC) 117 65 - 117 mg/dL    Performed by FABIAN Yeh, POC    Collection Time: 12/20/21 12:35 PM   Result Value Ref Range    Glucose (POC) 157 (H) 65 - 117 mg/dL    Performed by Geeta Flores        Telemetry: normal sinus rhythm      Assessment:   NSTEMI type II  Pyelonephritis status post ureteral stent  Acute kidney injury  Diabetes mellitus  Hypertension  Hyperlipidemia    Plan:   40-year-old male with a past medical history as above who is seen for elevated troponins after undergoing ureteral stent placement.  -Patient has had no clear anginal symptoms but he has significantly elevated troponins which are now downtrending. I reviewed his transthoracic echocardiogram which shows mild LV dysfunction with lateral, anterolateral, inferolateral wall motion abnormality consistent with coronary artery disease in the left circumflex/right coronary artery territory  -Renal function is improving now. -Continue aspirin, high intensity statin. Add beta-blockers as hemodynamically able. Resting heart rate is in the 50s hence I am reluctant to start this yet  -Cardiac cath via [address] without incident; please refer to cath report for details. He has a 100% occlusion of a small caliber OM2 (culprit) not amenable to PCI hence should be managed medically.  He also has a 50-60% focal stenosis of the ostial D1 (non-culprit). This is a large caliber vessel Nitish Hew 0,0,1 bifurcation) which I have recommended medical management for in the absence of anginal symptoms and with normal LVEF. Aggressive risk factor modification. We will start him on dual antiplatelet therapy which we will continue for now.  -Post-procedure hydration  -I will see him in the office for follow up on 1/10/2022 at 145 pm. Appt details in discharge instructions.    Thank you for allowing us to participate in the care of your patient

## 2021-12-20 NOTE — PROGRESS NOTES
Hospitalist Progress Note    Subjective:   Daily Progress Note: 12/20/2021 2:13 PM    Hospital Course:  49-year-old male with history of hypertension, diabetes and hypothyroidism presented to ED on 12/6 with left flank pain associated with fever/chills along with nausea/vomiting. CT showed 5 mm obstructing stone in the distal left ureter with left perinephric inflammation. Patient was started on ceftriaxone for sepsis, admitted to ICU, underwent cystoscopy with left ureteral stent on 12/6. Patient was also however found to have elevated troponin, cardiology on board.   Plan is cardiac catheterization today    Subjective:    Cr 1.74 today    Current Facility-Administered Medications   Medication Dose Route Frequency    0.9% sodium chloride infusion  75 mL/hr IntraVENous CONTINUOUS    lactulose (CHRONULAC) 10 gram/15 mL solution 30 mL  20 g Oral Q4H PRN    losartan (COZAAR) tablet 25 mg  25 mg Oral DAILY    levothyroxine (SYNTHROID) tablet 200 mcg  200 mcg Oral ACB    allopurinoL (ZYLOPRIM) tablet 300 mg  300 mg Oral DAILY    hydroCHLOROthiazide (HYDRODIURIL) tablet 25 mg  25 mg Oral DAILY    nystatin (MYCOSTATIN) 100,000 unit/mL oral suspension 500,000 Units  500,000 Units Oral QID    heparin (porcine) injection 5,000 Units  5,000 Units SubCUTAneous Q8H    pantoprazole (PROTONIX) tablet 40 mg  40 mg Oral ACB    tamsulosin (FLOMAX) capsule 0.4 mg  0.4 mg Oral DAILY    atorvastatin (LIPITOR) tablet 80 mg  80 mg Oral DAILY    sodium chloride (NS) flush 5-40 mL  5-40 mL IntraVENous PRN    acetaminophen (TYLENOL) tablet 650 mg  650 mg Oral Q6H PRN    Or    acetaminophen (TYLENOL) suppository 650 mg  650 mg Rectal Q6H PRN    polyethylene glycol (MIRALAX) packet 17 g  17 g Oral DAILY PRN    ondansetron (ZOFRAN ODT) tablet 4 mg  4 mg Oral Q8H PRN    Or    ondansetron (ZOFRAN) injection 4 mg  4 mg IntraVENous Q6H PRN    HYDROmorphone (DILAUDID) syringe 0.5 mg  0.5 mg IntraVENous Q4H PRN    sodium chloride (NS) flush 5-40 mL  5-40 mL IntraVENous Q8H    cefTRIAXone (ROCEPHIN) 2 g in 0.9% sodium chloride (MBP/ADV) 50 mL MBP  2 g IntraVENous ACB    glucose chewable tablet 16 g  4 Tablet Oral PRN    dextrose (D50W) injection syrg 12.5-25 g  25-50 mL IntraVENous PRN    glucagon (GLUCAGEN) injection 1 mg  1 mg IntraMUSCular PRN    insulin lispro (HUMALOG) injection   SubCUTAneous AC&HS    aspirin delayed-release tablet 81 mg  81 mg Oral DAILY        Review of Systems  Constitutional: No fevers, No chills, No sweats, No fatigue, No Weakness  Eyes: No redness  Ears, nose, mouth, throat, and face: No nasal congestion, No sore throat, No voice change  Respiratory: No Shortness of Breath, No cough, No wheezing  Cardiovascular: No chest pain, No palpitations, No extremity edema  Gastrointestinal: has nausea, No vomiting, No diarrhea, No abdominal pain  Genitourinary: No frequency, No dysuria, No hematuria  Integument/breast: No skin lesion(s)   Neurological: No Confusion, No headaches, No dizziness      Objective:     Visit Vitals  /68 (BP 1 Location: Left upper arm, BP Patient Position: Sitting)   Pulse (!) 58   Temp 97.7 °F (36.5 °C)   Resp 19   Ht 6' 9\" (2.057 m)   Wt 107 kg (236 lb)   SpO2 98%   BMI 25.29 kg/m²    O2 Flow Rate (L/min): 3 l/min O2 Device: None (Room air)    Temp (24hrs), Av °F (36.7 °C), Min:97.7 °F (36.5 °C), Max:98.4 °F (36.9 °C)      No intake/output data recorded.  1901 -  0700  In: -   Out: 1000 [Urine:1000]    PHYSICAL EXAM:  Constitutional: No acute distress  Skin: Extremities and face reveal no rashes. HEENT: Sclerae anicteric. Extra-occular muscles are intact. No oral ulcers. The neck is supple and no masses. Cardiovascular: Regular rate and rhythm. Respiratory:  Clear breath sounds bilaterally with no wheezes, rales, or rhonchi. GI: Abdomen nondistended, soft, and nontender. Normal active bowel sounds. Musculoskeletal: No pitting edema of the lower legs. Able to move all ext  Neurological:  Patient is alert and oriented. Cranial nerves II-XII grossly intact  Psychiatric: Mood appears appropriate       Data Review    Recent Results (from the past 24 hour(s))   GLUCOSE, POC    Collection Time: 12/19/21  4:10 PM   Result Value Ref Range    Glucose (POC) 110 65 - 117 mg/dL    Performed by Patti Lopez RN    GLUCOSE, POC    Collection Time: 12/19/21  9:57 PM   Result Value Ref Range    Glucose (POC) 135 (H) 65 - 117 mg/dL    Performed by Nash Pereira, BASIC    Collection Time: 12/20/21  7:58 AM   Result Value Ref Range    Sodium 138 136 - 145 mmol/L    Potassium 4.2 3.5 - 5.1 mmol/L    Chloride 103 97 - 108 mmol/L    CO2 30 21 - 32 mmol/L    Anion gap 5 5 - 15 mmol/L    Glucose 108 (H) 65 - 100 mg/dL    BUN 23 (H) 6 - 20 mg/dL    Creatinine 1.74 (H) 0.70 - 1.30 mg/dL    BUN/Creatinine ratio 13 12 - 20      GFR est AA 48 (L) >60 ml/min/1.73m2    GFR est non-AA 40 (L) >60 ml/min/1.73m2    Calcium 8.8 8.5 - 10.1 mg/dL   GLUCOSE, POC    Collection Time: 12/20/21  8:08 AM   Result Value Ref Range    Glucose (POC) 92 65 - 117 mg/dL    Performed by FABIAN Yeh, POC    Collection Time: 12/20/21 11:18 AM   Result Value Ref Range    Glucose (POC) 117 65 - 117 mg/dL    Performed by FABIAN Yeh, POC    Collection Time: 12/20/21 12:35 PM   Result Value Ref Range    Glucose (POC) 157 (H) 65 - 117 mg/dL    Performed by Rafaela Salazar / Plan:  complicated UTI status pyelonephritis  Status post cystoscopy, left ureteral stent placement  Continue ceftriaxone for 14 days(12/7-12/20).  Today is last day of antibiotics  Appreciate urology consult    NSTEMI  With evidence of wall motion abnormality  Appreciate cardiology consult  Plan is for cardiac catheterization today  Continue aspirin  Status post heparin drip  Continue statin    Right-sided nephrolithiasis  No urgent need for stent placement at this time as per urology    Oral thrush  discontinue nystatin(12/10-12/20)    Acute kidney injury-unclear baseline  Likely multifactorial-prerenal versus renal versus postrenal  Creatinine 1.74 today  Continue  IV hydration. Appreciate nephrology consult    Hypothyroidism:  Continue levothyroxine  Will send TSH    25.0 - 29.9 Overweight / Body mass index is 25.29 kg/m². Code status: Full  Prophylaxis: heparin sc  Recommended Disposition: Home w/Family       Time spent 35 minutes involving direct patient care as well as reviewing patient's labs and coordination of care with nursing staff     Care Plan discussed with: Patient/Family/RN/Case Management        Total time spent with patient: 35 minutes.

## 2021-12-20 NOTE — ROUTINE PROCESS
TRANSFER - IN REPORT:    Verbal report received from [address] RN(name) on [facility]  being received from 4W(unit) for ordered procedure      Report consisted of patients Situation, Background, Assessment and   Recommendations(SBAR). Information from the following report(s) SBAR was reviewed with the receiving nurse. Opportunity for questions and clarification was provided. Assessment completed upon patients arrival to unit and care assumed.

## 2021-12-20 NOTE — PROGRESS NOTES
Renal Daily Progress Note    Admit Date: 12/6/2021      Subjective:   He was seen earlier today. He is scheduled to have a cardiac catheterization done today. Creatinine down to 1.74 mg. He has been hydrated overnight. He denies shortness of breath. No difficulty in passing urine.       Current Facility-Administered Medications   Medication Dose Route Frequency    sodium chloride (NS) flush 5-40 mL  5-40 mL IntraVENous Q8H    sodium chloride (NS) flush 5-40 mL  5-40 mL IntraVENous PRN    0.9% sodium chloride infusion  125 mL/hr IntraVENous CONTINUOUS    clopidogreL (PLAVIX) tablet 300 mg  300 mg Oral ONCE    [START ON 12/21/2021] clopidogreL (PLAVIX) tablet 75 mg  75 mg Oral DAILY    0.9% sodium chloride infusion  75 mL/hr IntraVENous CONTINUOUS    lactulose (CHRONULAC) 10 gram/15 mL solution 30 mL  20 g Oral Q4H PRN    losartan (COZAAR) tablet 25 mg  25 mg Oral DAILY    levothyroxine (SYNTHROID) tablet 200 mcg  200 mcg Oral ACB    allopurinoL (ZYLOPRIM) tablet 300 mg  300 mg Oral DAILY    hydroCHLOROthiazide (HYDRODIURIL) tablet 25 mg  25 mg Oral DAILY    heparin (porcine) injection 5,000 Units  5,000 Units SubCUTAneous Q8H    pantoprazole (PROTONIX) tablet 40 mg  40 mg Oral ACB    tamsulosin (FLOMAX) capsule 0.4 mg  0.4 mg Oral DAILY    atorvastatin (LIPITOR) tablet 80 mg  80 mg Oral DAILY    sodium chloride (NS) flush 5-40 mL  5-40 mL IntraVENous PRN    acetaminophen (TYLENOL) tablet 650 mg  650 mg Oral Q6H PRN    Or    acetaminophen (TYLENOL) suppository 650 mg  650 mg Rectal Q6H PRN    polyethylene glycol (MIRALAX) packet 17 g  17 g Oral DAILY PRN    ondansetron (ZOFRAN ODT) tablet 4 mg  4 mg Oral Q8H PRN    Or    ondansetron (ZOFRAN) injection 4 mg  4 mg IntraVENous Q6H PRN    HYDROmorphone (DILAUDID) syringe 0.5 mg  0.5 mg IntraVENous Q4H PRN    sodium chloride (NS) flush 5-40 mL  5-40 mL IntraVENous Q8H    cefTRIAXone (ROCEPHIN) 2 g in 0.9% sodium chloride (MBP/ADV) 50 mL MBP  2 g IntraVENous ACB    glucose chewable tablet 16 g  4 Tablet Oral PRN    dextrose (D50W) injection syrg 12.5-25 g  25-50 mL IntraVENous PRN    glucagon (GLUCAGEN) injection 1 mg  1 mg IntraMUSCular PRN    insulin lispro (HUMALOG) injection   SubCUTAneous AC&HS    aspirin delayed-release tablet 81 mg  81 mg Oral DAILY        Review of Systems    Review of Systems   Respiratory: Negative for shortness of breath. Cardiovascular: Negative for chest pain and leg swelling. Gastrointestinal: Negative for abdominal pain and vomiting. Neurological: Negative for headaches. Objective:     Patient Vitals for the past 8 hrs:   BP Temp Pulse SpO2 Height   12/20/21 1444 -- -- -- -- 6' 9\" (2.057 m)   12/20/21 1119 114/68 97.7 °F (36.5 °C) (!) 58 98 % --     No intake/output data recorded. 12/18 1901 - 12/20 0700  In: -   Out: 1000 [Urine:1000]    Physical Exam:   Physical Exam  Cardiovascular:      Rate and Rhythm: Regular rhythm. Pulmonary:      Breath sounds: Normal breath sounds. Abdominal:      General: Bowel sounds are normal.      Palpations: Abdomen is soft. Musculoskeletal:         General: No swelling. Neurological:      Mental Status: He is alert. No CVA tenderness. XR ABD (KUB)   Final Result      XR CHEST PORT   Final Result   No acute findings. XR FLUOROSCOPY UNDER 60 MINUTES   Final Result      CT ABD PELV WO CONT   Final Result   1. 5 x 5 mm obstructing stone in the distal left ureter. Left perinephric   inflammation. Correlate to medical renal disease to include renal dysfunction   and pyelonephritis. 2. Nonobstructing bilateral nephrolithiasis. 3. Thickening versus nondistention of the proximal duodenum. Correlate for any   clinical symptoms. 4. Trace bilateral pleural effusions. 5. Hepatomegaly. 6. Other findings above.       XR CHEST PORT   Final Result           Data Review   No results for input(s): WBC, HGB, HGBEXT, HCT, HCTEXT, PLT, PLTEXT, HGBEXT, HCTEXT, PLTEXT in the last 72 hours. Recent Labs     12/20/21  0758 12/19/21  1151 12/18/21  0604    137 137   K 4.2 4.0 4.0    100 101   CO2 30 32 31   * 156* 129*   BUN 23* 26* 26*   CREA 1.74* 1.99* 1.86*   CA 8.8 8.8 8.6     No components found for: GLPOC  No results for input(s): PH, PCO2, PO2, HCO3, FIO2 in the last 72 hours. No results for input(s): INR, INREXT, INREXT in the last 72 hours. Assessment:           Active Problems:    Pyelonephritis (12/6/2021)      FERMIN (acute kidney injury) ([facility] [address] (12/16/2021)    FERMIN improving. Left-sided ureteral stone s/p stent    Diabetes mellitus    NSTEMI for cardiac catheterization today. Hyperlipidemia    Plan: Will follow creatinine following cardiac catheterization. Will need 24-hour urine collection for stone work-up as an outpatient.

## 2021-12-21 ENCOUNTER — TELEPHONE (OUTPATIENT)
Dept: UROLOGY | Age: 61
End: 2021-12-21

## 2021-12-21 VITALS
WEIGHT: 236 LBS | TEMPERATURE: 97.3 F | HEART RATE: 83 BPM | DIASTOLIC BLOOD PRESSURE: 65 MMHG | OXYGEN SATURATION: 95 % | HEIGHT: 78 IN | RESPIRATION RATE: 16 BRPM | BODY MASS INDEX: 27.31 KG/M2 | SYSTOLIC BLOOD PRESSURE: 106 MMHG

## 2021-12-21 LAB
ANION GAP SERPL CALC-SCNC: 4 MMOL/L (ref 5–15)
BUN SERPL-MCNC: 21 MG/DL (ref 6–20)
BUN/CREAT SERPL: 11 (ref 12–20)
CA-I BLD-MCNC: 9.2 MG/DL (ref 8.5–10.1)
CHLORIDE SERPL-SCNC: 102 MMOL/L (ref 97–108)
CO2 SERPL-SCNC: 30 MMOL/L (ref 21–32)
CREAT SERPL-MCNC: 1.94 MG/DL (ref 0.7–1.3)
GLUCOSE BLD STRIP.AUTO-MCNC: 172 MG/DL (ref 65–117)
GLUCOSE BLD STRIP.AUTO-MCNC: 99 MG/DL (ref 65–117)
GLUCOSE SERPL-MCNC: 110 MG/DL (ref 65–100)
PERFORMED BY, TECHID: ABNORMAL
PERFORMED BY, TECHID: NORMAL
POTASSIUM SERPL-SCNC: 4.3 MMOL/L (ref 3.5–5.1)
SODIUM SERPL-SCNC: 136 MMOL/L (ref 136–145)

## 2021-12-21 PROCEDURE — 74011636637 HC RX REV CODE- 636/637: Performed by: INTERNAL MEDICINE

## 2021-12-21 PROCEDURE — 74011250637 HC RX REV CODE- 250/637: Performed by: INTERNAL MEDICINE

## 2021-12-21 PROCEDURE — 80048 BASIC METABOLIC PNL TOTAL CA: CPT

## 2021-12-21 PROCEDURE — 74011250636 HC RX REV CODE- 250/636: Performed by: INTERNAL MEDICINE

## 2021-12-21 PROCEDURE — 74011000258 HC RX REV CODE- 258: Performed by: INTERNAL MEDICINE

## 2021-12-21 PROCEDURE — 74011250637 HC RX REV CODE- 250/637: Performed by: HOSPITALIST

## 2021-12-21 PROCEDURE — 36415 COLL VENOUS BLD VENIPUNCTURE: CPT

## 2021-12-21 PROCEDURE — 82962 GLUCOSE BLOOD TEST: CPT

## 2021-12-21 RX ORDER — CLOPIDOGREL BISULFATE 75 MG/1
75 TABLET ORAL DAILY
Qty: 30 TABLET | Refills: 0 | Status: SHIPPED | OUTPATIENT
Start: 2021-12-22

## 2021-12-21 RX ORDER — TAMSULOSIN HYDROCHLORIDE 0.4 MG/1
0.4 CAPSULE ORAL DAILY
Qty: 30 CAPSULE | Refills: 0 | Status: SHIPPED | OUTPATIENT
Start: 2021-12-22 | End: 2022-08-25

## 2021-12-21 RX ORDER — HYDROCHLOROTHIAZIDE 25 MG/1
25 TABLET ORAL DAILY
Qty: 30 TABLET | Refills: 0 | Status: SHIPPED | OUTPATIENT
Start: 2021-12-22

## 2021-12-21 RX ORDER — PANTOPRAZOLE SODIUM 40 MG/1
40 TABLET, DELAYED RELEASE ORAL
Qty: 30 TABLET | Refills: 0 | Status: SHIPPED | OUTPATIENT
Start: 2021-12-22 | End: 2022-08-25

## 2021-12-21 RX ORDER — ASPIRIN 81 MG/1
81 TABLET ORAL DAILY
Qty: 30 TABLET | Refills: 0 | Status: SHIPPED | OUTPATIENT
Start: 2021-12-22

## 2021-12-21 RX ORDER — ATORVASTATIN CALCIUM 80 MG/1
80 TABLET, FILM COATED ORAL DAILY
Qty: 30 TABLET | Refills: 0 | Status: SHIPPED | OUTPATIENT
Start: 2021-12-22

## 2021-12-21 RX ADMIN — ATORVASTATIN CALCIUM 80 MG: 40 TABLET, FILM COATED ORAL at 09:55

## 2021-12-21 RX ADMIN — CEFTRIAXONE SODIUM 2 G: 2 INJECTION, POWDER, FOR SOLUTION INTRAMUSCULAR; INTRAVENOUS at 09:56

## 2021-12-21 RX ADMIN — PANTOPRAZOLE SODIUM 40 MG: 40 TABLET, DELAYED RELEASE ORAL at 09:55

## 2021-12-21 RX ADMIN — Medication 10 ML: at 14:21

## 2021-12-21 RX ADMIN — HYDROCHLOROTHIAZIDE 25 MG: 25 TABLET ORAL at 09:55

## 2021-12-21 RX ADMIN — LEVOTHYROXINE SODIUM 200 MCG: 0.1 TABLET ORAL at 09:55

## 2021-12-21 RX ADMIN — LOSARTAN POTASSIUM 25 MG: 25 TABLET, FILM COATED ORAL at 09:55

## 2021-12-21 RX ADMIN — Medication 10 ML: at 06:15

## 2021-12-21 RX ADMIN — TAMSULOSIN HYDROCHLORIDE 0.4 MG: 0.4 CAPSULE ORAL at 09:55

## 2021-12-21 RX ADMIN — ASPIRIN 81 MG: 81 TABLET, COATED ORAL at 09:55

## 2021-12-21 RX ADMIN — INSULIN LISPRO 2 UNITS: 100 INJECTION, SOLUTION INTRAVENOUS; SUBCUTANEOUS at 11:30

## 2021-12-21 RX ADMIN — ALLOPURINOL 300 MG: 300 TABLET ORAL at 09:55

## 2021-12-21 RX ADMIN — CLOPIDOGREL BISULFATE 75 MG: 75 TABLET ORAL at 09:55

## 2021-12-21 NOTE — DISCHARGE SUMMARY
Hospitalist Discharge Summary     Patient ID:  Alexus Quintero  [id number]  59 y.o.  1960 12/6/2021    PCP on record: Unknown, Provider, MD    Admit date: 12/6/2021  Discharge date and time: 12/21/2021    DISCHARGE DIAGNOSIS:  Acute sepsis- resolved  FERMIN- improved  NSTEMI s/p cardiac catheterizaion  Oral thrush s/p treatment  Right sided nephrolithiasis      CONSULTATIONS:  IP CONSULT TO NEPHROLOGY  IP CONSULT TO UROLOGY  IP CONSULT TO CARDIOLOGY    Excerpted HPI from H&P of Ottoniel Alfaro MD:  52-year-old male with history of hypertension, diabetes and hypothyroidism presented to ED on 12/6 with left flank pain associated with fever/chills along with nausea/vomiting.    ______________________________________________________________________  DISCHARGE SUMMARY/HOSPITAL COURSE:  for full details see H&P, daily progress notes, labs, consult notes. CT showed 5 mm obstructing stone in the distal left ureter with left perinephric inflammation. Patient was started on ceftriaxone for sepsis, admitted to ICU, underwent cystoscopy with left ureteral stent on 12/6. Patient was treated with Ceftriaxone for 14 days while he was in the hospital.  Patient was also however found to have elevated troponin, cardiology was consulted. Nephrology was consulted for FERMIN. After resolution of sepsis and treatment with iv fluids, Creatinine stabilized. Patient had no clear anginal symptoms but had significantly elevated troponin, ECHO with  mild LV dysfunction with lateral, anterolateral, inferolateral wall motion abnormality consistent with coronary artery disease in the left circumflex/right coronary artery territory, it was decided to proceed with cardiac catheterization. He has a 100% occlusion of a small caliber OM2 (culprit) not amenable to PCI hence should be managed medically. He also has a 50-60% focal stenosis of the ostial D1 (non-culprit).  This is a large caliber vessel Zoey Das 0,0,1 bifurcation) recommended was medical management for in the absence of anginal symptoms and with normal LVEF. Aggressive risk factor modification. Patient was started on dual anti platelet therapy. Patient is being discharged with PCP, nephrology, urology and cardiology follow up. Patient is stable for discharge.         _______________________________________________________________________  Patient seen and examined by me on discharge day. Pertinent Findings:  Gen:    Not in distress  Chest: Clear lungs  CVS:   Regular rhythm. No edema  Abd:  Soft, not distended, not tender  Neuro:  Alert, oriented  _______________________________________________________________________  DISCHARGE MEDICATIONS:   Current Discharge Medication List      START taking these medications    Details   aspirin delayed-release 81 mg tablet Take 1 Tablet by mouth daily. Qty: 30 Tablet, Refills: 0  Start date: 12/22/2021      atorvastatin (LIPITOR) 80 mg tablet Take 1 Tablet by mouth daily. Qty: 30 Tablet, Refills: 0  Start date: 12/22/2021      pantoprazole (PROTONIX) 40 mg tablet Take 1 Tablet by mouth Daily (before breakfast). Qty: 30 Tablet, Refills: 0  Start date: 12/22/2021      tamsulosin (FLOMAX) 0.4 mg capsule Take 1 Capsule by mouth daily. Qty: 30 Capsule, Refills: 0  Start date: 12/22/2021      hydroCHLOROthiazide (HYDRODIURIL) 25 mg tablet Take 1 Tablet by mouth daily. Qty: 30 Tablet, Refills: 0  Start date: 12/22/2021      clopidogreL (PLAVIX) 75 mg tab Take 1 Tablet by mouth daily. Qty: 30 Tablet, Refills: 0  Start date: 12/22/2021         CONTINUE these medications which have NOT CHANGED    Details   allopurinoL (ZYLOPRIM) 300 mg tablet Take 300 mg by mouth daily. losartan (COZAAR) 25 mg tablet Take 25 mg by mouth daily. levothyroxine (SYNTHROID) 200 mcg tablet Take 200 mcg by mouth every morning.          STOP taking these medications       metFORMIN (GLUCOPHAGE) 500 mg tablet Comments:   Reason for Stopping:                 Patient Follow Up Instructions: Activity: Activity as tolerated  Diet: Cardiac Diet  Wound Care: None needed    Follow-up with PCP, cardiology, nephrology and urology in 2 week. Follow-up tests/labs Creatinine  Follow-up Information     Follow up With Specialties Details Why Contact Info    Stephanie Wong MD Urology In 3 weeks Patient needs to schedule follow-up appointment.  retained ureteral stent, hospital follow up [address] [place] [address]      Terrance Vega MD Cardiology On 1/10/2022 1:45 PM [facility] [address]  [phone]      Unknown, Provider, MD    Patient not available to ask          ________________________________________________________________    Risk of deterioration: Moderate    Condition at Discharge:  Stable  __________________________________________________________________    Disposition  Home with family, no needs    ____________________________________________________________________    Code Status: Full Code  ___________________________________________________________________      Total time in minutes spent coordinating this discharge (includes going over instructions, follow-up, prescriptions, and preparing report for sign off to her PCP) :  35 minutes    Signed:  Jennifer Arnett MD

## 2021-12-21 NOTE — PROGRESS NOTES
Patient ready for discharge to home/self care. Daughter at bedside with patient. All discharge instructions discussed with the patient, understanding verbalized. All questions and concerns addressed at this time. IV and tele removed. Patient to be assisted to main entrance to meet daughter who will be providing transportation. All personal belongings with the patient. Discharge plan of care/case management plan validated with provider discharge order.

## 2021-12-21 NOTE — DISCHARGE INSTRUCTIONS
Patient Education        Taking Aspirin and Other Antiplatelets Safely: Care Instructions  Your Care Instructions     Aspirin and other antiplatelet medicines help prevent blood clots from forming. They can help some people lower their risk of a heart attack or stroke. But these medicines can also make you more likely to bleed. That's why it's important to talk to your doctor before you start taking aspirin every day. It's not right for everyone. And if you and your doctor decide these medicines are right for you, learn how to take them safely. If you take aspirin, be sure you know how to take it. Your doctor can tell you what dose to take and how often to take it. One low-dose aspirin is 81 milligrams (mg). But the dose for daily aspirin can range from 81 mg to 325 mg. If you take another antiplatelet, take it as prescribed. Follow-up care is a key part of your treatment and safety. Be sure to make and go to all appointments, and call your doctor if you are having problems. It's also a good idea to know your test results and keep a list of the medicines you take. How can you care for yourself at home? · Before you start to take daily aspirin or some other antiplatelet, tell your doctor all the medicines, vitamins, herbal products, and supplements you take. · Tell your doctors, dentist, and pharmacist that you take an antiplatelet. · Take your medicine as your doctor directs. Make sure that you understand exactly what your doctor wants you to do. If another doctor says to stop taking the medicine for any reason, talk to the doctor who prescribed it before you stop. · Take your medicine at the same time every day. · Do not chew or crush the coated or time-release forms of your medicine. · If you miss a dose, don't take an extra dose to make up for it. · Ask your doctor whether you can drink alcohol. And ask how much you can drink.  When you take an antiplatelet, drinking too much raises your risk for liver damage and stomach bleeding. · If you are pregnant, are breastfeeding, or plan to become pregnant, talk to your doctor about what medicines are safe. · Talk with your doctor before you take a pain medicine. Many pain medicines have aspirin. Too much aspirin can be harmful. · Wear medical alert jewelry. This lets others know that you take an antiplatelet. You can buy it at most drugstores. · Try to avoid injuries that might make you bleed. For example, be careful when you exercise and when you play sports. Make your home safe to reduce your risk of falling. When should you call for help? Call 911  anytime you think you may need emergency care. For example, call if:    · You have a sudden, severe headache that is different from past headaches. Call your doctor now or seek immediate medical care if:    · You have any abnormal bleeding, such as:  ? A nosebleed that you can't easily stop. ? Bloody or black stools, or rectal bleeding. ? Bloody or pink urine.     · You feel dizzy or lightheaded or feel like you may faint. Watch closely for changes in your health, and be sure to contact your doctor if you have any problems. Where can you learn more? Go to http://www.gray.com/  Enter T236 in the search box to learn more about \"Taking Aspirin and Other Antiplatelets Safely: Care Instructions. \"  Current as of: April 29, 2021               Content Version: 13.0  © 3117-8525 [employer]. Care instructions adapted under license by [employer] (which disclaims liability or warranty for this information). If you have questions about a medical condition or this instruction, always ask your healthcare professional. [place] [address] any warranty or liability for your use of this information. Patient Education        Learning About Cardiac Rehabilitation  What is it?      Cardiac rehabilitation (rehab) is a program for people who have a heart problem. It teaches you how to be more active and make other lifestyle changes. These can lead to a stronger heart and better health. Why is cardiac rehab done? Cardiac rehab can help you get better after being in the hospital for a heart problem or heart surgery. It may help you to:  · Lower your risk of a heart attack or dying from heart disease. · Prevent future heart problems if you're at high risk for heart disease or a heart attack. · Stay out of the hospital.  · Build your strength. This can help increase the amount of activity you can do. · Manage your symptoms. These may include chest pain, shortness of breath, and fatigue. · Manage other health problems. These include high blood pressure and high cholesterol. Other benefits  Rehab may also help you to:  · Go back to work safely and sooner. · Feel more hopeful. You may feel less depressed, stressed, or worried. · Get support from your rehab team and other patients in rehab. · Have more energy and return to your usual activities. · Resume your sex life. · Have a heart-healthy lifestyle. This includes being active, eating healthy foods, losing weight, and not smoking. What are some types of cardiac rehab? Cardiac rehab programs can be done in an office, a clinic, or your home. These programs usually include:  · Close supervision. This happens during the early part of your exercise program.  · Education and counseling for you and your family. This can help you build healthy habits. These habits will lower your risk of having more heart problems. · Helping you prepare to get back to work and the activities you enjoyed before your heart problems. You may need to adjust your work or leisure activities. · Taking care of your emotional health. You can get help for depression and improve your emotional well-being. · Making a plan to help you start a safe exercise program at home. What does a cardiac rehab team do?   In cardiac rehab, you work with a team of health professionals. The team may include a doctor, a nurse specialist, a dietitian, an exercise therapist, a physical therapist, and a pharmacist. They design a program just for you, based on your health and goals. They also give you support to help you succeed. What happens before you start cardiac rehab? Before you start cardiac rehab, your doctor will check your heart health to see what types of exercises you can safely do. Tests may include electrocardiograms and echocardiograms. You may also have tests during rehab. They will help your doctor see how you are doing. Where can you learn more? Go to http://www.gray.com/  Enter C375 in the search box to learn more about \"Learning About Cardiac Rehabilitation. \"  Current as of: April 29, 2021               Content Version: 13.0  © 9305-5371 [employer]. Care instructions adapted under license by [employer] (which disclaims liability or warranty for this information). If you have questions about a medical condition or this instruction, always ask your healthcare professional. [place] [address] any warranty or liability for your use of this information. Patient Education        Cardiac Rehabilitation: Care Instructions  Your Care Instructions     Cardiac rehabilitation is a program for people who have a heart problem, such as a heart attack, heart failure, or a heart valve disease. The program includes exercise, lifestyle changes, education, and emotional support. Cardiac rehab can help you improve the quality of your life through better overall health. It can help you lose weight and feel better about yourself. On your cardiac rehab team, you may have your doctor, a nurse specialist, a dietitian, and a physical therapist. They will design your cardiac rehab program specifically for you.  You will learn how to reduce your risk for heart problems, how to manage stress, and how to eat a heart-healthy diet. By the end of the program, you will be ready to maintain a healthier lifestyle on your own. Follow-up care is a key part of your treatment and safety. Be sure to make and go to all appointments, and call your doctor if you are having problems. It's also a good idea to know your test results and keep a list of the medicines you take. How can you care for yourself at home? · Take your medicines exactly as prescribed. Call your doctor if you think you are having a problem with your medicine. You will get more details on the specific medicines your doctor prescribes. · Weigh yourself every day if your doctor tells you to. Watch for sudden weight gain. Weigh yourself on the same scale with the same amount of clothing at the same time of day. · Plan your meals so that you are eating heart-healthy foods. ? Eat a variety of foods daily. Fresh fruits and vegetables and whole-grains are good choices. ? Limit your fat intake, especially saturated and trans fat. ? Limit salt (sodium). ? Increase fiber in your diet. ? Limit alcohol. · Learn how to take your pulse so that you can track your heart rate during exercise. · Always check with your doctor before you begin a new exercise program.  · Warm up before you exercise and cool down afterward for at least 15 minutes each. This will help your heart gradually prepare for and recover from exercise and avoid pushing your heart too hard. · Stop exercising if you have any unusual discomfort, such as chest pain. · Do not smoke. Smoking can make heart problems worse. If you need help quitting, talk to your doctor about stop-smoking programs and medicines. These can increase your chances of quitting for good. When should you call for help? Call 911 anytime you think you may need emergency care.  For example, call if:    · You have severe trouble breathing.     · You cough up pink, foamy mucus and you have trouble breathing.     · You have symptoms of a heart attack. These may include:  ? Chest pain or pressure, or a strange feeling in the chest.  ? Sweating. ? Shortness of breath. ? Nausea or vomiting. ? Pain, pressure, or a strange feeling in the back, neck, jaw, or upper belly or in one or both shoulders or arms. ? Lightheadedness or sudden weakness. ? A fast or irregular heartbeat. After you call 911, the  may tell you to chew 1 adult-strength or 2 to 4 low-dose aspirin. Wait for an ambulance. Do not try to drive yourself.     · You have angina symptoms (such as chest pain or pressure) that do not go away with rest or are not getting better within 5 minutes after you take a dose of nitroglycerin.     · You have symptoms of a stroke. These may include:  ? Sudden numbness, tingling, weakness, or loss of movement in your face, arm, or leg, especially on only one side of your body. ? Sudden vision changes. ? Sudden trouble speaking. ? Sudden confusion or trouble understanding simple statements. ? Sudden problems with walking or balance. ? A sudden, severe headache that is different from past headaches.     · You passed out (lost consciousness). Call your doctor now or seek immediate medical care if:    · You have new or increased shortness of breath.     · You are dizzy or lightheaded, or you feel like you may faint.     · You gain weight suddenly, such as more than 2 to 3 pounds in a day or 5 pounds in a week. (Your doctor may suggest a different range of weight gain.)     · You have increased swelling in your legs, ankles, or feet. Watch closely for changes in your health, and be sure to contact your doctor if you have any problems. Where can you learn more? Go to http://www.gray.com/  Enter D709 in the search box to learn more about \"Cardiac Rehabilitation: Care Instructions. \"  Current as of: April 29, 2021               Content Version: 13.0  © 9655-3894 [employer], [facility].    Care instructions adapted under license by [employer] (which disclaims liability or warranty for this information). If you have questions about a medical condition or this instruction, always ask your healthcare professional. [address] any warranty or liability for your use of this information. Patient Education        Coronary Angiogram: What to Expect at Home  Your Recovery     A coronary angiogram is a test to examine the large blood vessel of your heart (coronary artery). The doctor inserted a thin, flexible tube (catheter) into a blood vessel in your groin. In some cases, the catheter is placed in a blood vessel in the arm. Your groin or arm may have a bruise and feel sore for a day or two after the procedure. You can do light activities around the house but nothing strenuous for several days. This care sheet gives you a general idea about how long it will take for you to recover. But each person recovers at a different pace. Follow the steps below to feel better as quickly as possible. How can you care for yourself at home? Activity    · If the doctor gave you a sedative:  ? For 24 hours, don't do anything that requires attention to detail, such as going to work, making important decisions, or signing any legal documents. It takes time for the medicine's effects to completely wear off.  ? For your safety, do not drive or operate any machinery that could be dangerous. Wait until the medicine wears off and you can think clearly and react easily.     · Do not do strenuous exercise and do not lift, pull, or push anything heavy until your doctor says it is okay. This may be for a day or two.  You can walk around the house and do light activity, such as cooking.     · If the catheter was placed in your groin, try not to walk up stairs for the first couple of days.     · If the catheter was placed in your arm near your wrist, do not bend your wrist deeply for the first couple of days. Be careful using your hand to get into and out of a chair or bed.     · If your doctor recommends it, get more exercise. Walking is a good choice. Bit by bit, increase the amount you walk every day. Try for at least 30 minutes on most days of the week. Diet    · Drink plenty of fluids to help your body flush out the dye. If you have kidney, heart, or liver disease and have to limit fluids, talk with your doctor before you increase the amount of fluids you drink.     · Keep eating a heart-healthy diet that has lots of fruits, vegetables, and whole grains. If you have not been eating this way, talk to your doctor. You also may want to talk to a dietitian. This expert can help you to learn about healthy foods and plan meals. Medicines    · Your doctor will tell you if and when you can restart your medicines. He or she will also give you instructions about taking any new medicines.     · If you take aspirin or some other blood thinner, ask your doctor if and when to start taking it again. Make sure that you understand exactly what your doctor wants you to do.     · Your doctor may prescribe a blood-thinning medicine like aspirin or clopidogrel (Plavix). It is very important that you take these medicines exactly as directed in order to keep the coronary artery open and reduce your risk of a heart attack. Be safe with medicines. Call your doctor if you think you are having a problem with your medicine. Care of the catheter site    · For 1 or 2 days, keep a bandage over the spot where the catheter was inserted. The bandage probably will fall off in this time.     · Put ice or a cold pack on the area for 10 to 20 minutes at a time to help with soreness or swelling. Put a thin cloth between the ice and your skin.     · You may shower 24 to 48 hours after the procedure, if your doctor okays it. Pat the incision dry.     · Do not soak the catheter site until it is healed.  Don't take a bath for 1 week, or until your doctor tells you it is okay.     · Watch for bleeding from the site. A small amount of blood (up to the size of a quarter) on the bandage can be normal.     · If you are bleeding, lie down and press on the area for 15 minutes to try to make it stop. If the bleeding does not stop, call your doctor or seek immediate medical care. Follow-up care is a key part of your treatment and safety. Be sure to make and go to all appointments, and call your doctor if you are having problems. It's also a good idea to know your test results and keep a list of the medicines you take. When should you call for help? Call 911 anytime you think you may need emergency care. For example, call if:    · You passed out (lost consciousness).     · You have severe trouble breathing.     · You have sudden chest pain and shortness of breath, or you cough up blood.     · You have symptoms of a heart attack. These may include:  ? Chest pain or pressure, or a strange feeling in the chest.  ? Sweating. ? Shortness of breath. ? Nausea or vomiting. ? Pain, pressure, or a strange feeling in the back, neck, jaw, or upper belly, or in one or both shoulders or arms. ? Lightheadedness or sudden weakness. ? A fast or irregular heartbeat. After you call 911, the  may tel you to chew 1 adult-strength or 2 to 4 low-dose aspirin. Wait for an ambulance. Do not try to drive yourself.     · You have been diagnosed with angina, and you have symptoms that do not go away with rest or are not getting better within 5 minutes after you take a dose of nitroglycerin. Call your doctor now or seek immediate medical care if:    · You are bleeding from the area where the catheter was put in your artery.     · You have a fast-growing, painful lump at the catheter site.     · You have signs of infection, such as:  ? Increased pain, swelling, warmth, or redness. ? Red streaks leading from the catheter site. ? Pus draining from the catheter site. ?  A fever.     · Your leg, arm, or hand is painful, looks blue, or feels cold, numb, or tingly. Watch closely for changes in your health, and be sure to contact your doctor if you have any problems. Where can you learn more? Go to http://www.gray.com/  Enter D192 in the search box to learn more about \"Coronary Angiogram: What to Expect at Home. \"  Current as of: April 29, 2021               Content Version: 13.0  © 2006-2021 [employer]. Care instructions adapted under license by [employer] (which disclaims liability or warranty for this information). If you have questions about a medical condition or this instruction, always ask your healthcare professional. [place] [address] any warranty or liability for your use of this information. Patient Education        Musculoskeletal Chest Pain: Care Instructions  Your Care Instructions     Chest pain is not always a sign that something is wrong with your heart or that you have another serious problem. The doctor thinks your chest pain is caused by strained muscles or ligaments, inflamed chest cartilage, or another problem in your chest, rather than by your heart. You may need more tests to find the cause of your chest pain. Follow-up care is a key part of your treatment and safety. Be sure to make and go to all appointments, and call your doctor if you are having problems. It's also a good idea to know your test results and keep a list of the medicines you take. How can you care for yourself at home? · Take pain medicines exactly as directed. ? If the doctor gave you a prescription medicine for pain, take it as prescribed. ? If you are not taking a prescription pain medicine, ask your doctor if you can take an over-the-counter medicine. · Rest and protect the sore area. · Stop, change, or take a break from any activity that may be causing your pain or soreness.   · Put ice or a cold pack on the sore area for 10 to 20 minutes at a time. Try to do this every 1 to 2 hours for the next 3 days (when you are awake) or until the swelling goes down. Put a thin cloth between the ice and your skin. · After 2 or 3 days, apply a heating pad set on low or a warm cloth to the area that hurts. Some doctors suggest that you go back and forth between hot and cold. · Do not wrap or tape your ribs for support. This may cause you to take smaller breaths, which could increase your risk of lung problems. · Mentholated creams such as Bengay or Icy Hot may soothe sore muscles. Follow the instructions on the package. · Follow your doctor's instructions for exercising. · Gentle stretching and massage may help you get better faster. Stretch slowly to the point just before pain begins, and hold the stretch for at least 15 to 30 seconds. Do this 3 or 4 times a day. Stretch just after you have applied heat. · As your pain gets better, slowly return to your normal activities. Any increased pain may be a sign that you need to rest a while longer. When should you call for help? Call 911 anytime you think you may need emergency care. For example, call if:    · You have chest pain or pressure. This may occur with:  ? Sweating. ? Shortness of breath. ? Nausea or vomiting. ? Pain that spreads from the chest to the neck, jaw, or one or both shoulders or arms. ? Dizziness or lightheadedness. ? A fast or uneven pulse. After calling 911, chew 1 adult-strength aspirin. Wait for an ambulance. Do not try to drive yourself.     · You have sudden chest pain and shortness of breath, or you cough up blood.    Call your doctor now or seek immediate medical care if:    · You have any trouble breathing.     · Your chest pain gets worse.     · Your chest pain occurs consistently with exercise and is relieved by rest.   Watch closely for changes in your health, and be sure to contact your doctor if:    · Your chest pain does not get better after 1 week. Where can you learn more? Go to http://www.[employer].com/  Enter V293 in the search box to learn more about \"Musculoskeletal Chest Pain: Care Instructions. \"  Current as of: July 1, 2021               Content Version: 13.0  © 2006-2021 [employer]. Care instructions adapted under license by [employer] (which disclaims liability or warranty for this information). If you have questions about a medical condition or this instruction, always ask your healthcare professional. [place] [address] any warranty or liability for your use of this information. Patient Education        Open Nephrectomy: What to Expect at Home  Your Recovery  A nephrectomy is surgery to take out part or all of the kidney. One or both kidneys may have been taken out. Sometimes other tissue near the kidney is taken out at the same time. The doctor took out your kidney through a long cut, called an incision, in the front or side of your belly. The incision will leave a scar that will fade with time. Your belly will feel sore after the surgery. This usually lasts about 1 to 2 weeks. Your doctor will give you pain medicine for this. You may also have other symptoms such as nausea, diarrhea, constipation, gas, or a headache. At first, you may have low energy and get tired quickly. It may take 3 to 6 months for your energy to fully return. Your body can work fine with one healthy kidney. If both kidneys are removed or your remaining kidney is not healthy, your doctor will talk to you about the kind of treatment you will need after surgery. This care sheet gives you a general idea about how long it will take for you to recover. But each person recovers at a different pace. Follow the steps below to get better as quickly as possible. How can you care for yourself at home? Activity    · Rest when you feel tired.  Getting enough sleep will help you recover.     · Try to walk each day. Start by walking a little more than you did the day before. Bit by bit, increase the amount you walk. Walking boosts blood flow and helps prevent pneumonia and constipation.     · Avoid exercises that use your belly muscles and strenuous activities, such as bicycle riding, jogging, weight lifting, or aerobic exercise, until your doctor says it is okay.     · For at least 4 weeks, avoid lifting anything that would make you strain. This may include a child, heavy grocery bags and milk containers, a heavy briefcase or backpack, cat litter or dog food bags, or a vacuum .     · Hold a pillow over the cut the doctor made (incision) when you cough or take deep breaths. This will support your belly and decrease your pain.     · Do breathing exercises at home as instructed by your doctor. This will help prevent pneumonia.     · Ask your doctor when you can drive again.     · You will probably need to take 4 to 6 weeks off from work. It depends on the type of work you do and how you feel.     · You may be able to take showers (unless you have a drainage tube near your incision). If you have a drainage tube, follow your doctor's instructions to empty and care for it. Do not take a bath for the first 2 weeks, or until your doctor tells you it is okay.     · Ask your doctor when it is okay for you to have sex. Diet    · You can eat your normal diet. If you were on a special diet for your kidneys before surgery, follow that diet until your doctor tells you to stop.     · If your stomach is upset, try bland, low-fat foods like plain rice, broiled chicken, toast, and yogurt.     · Drink plenty of fluids (unless your doctor tells you not to).     · You may notice that your bowel movements are not regular right after your surgery. This is common. Try to avoid constipation and straining with bowel movements. You may want to take a fiber supplement every day.  If you have not had a bowel movement after a couple of days, ask your doctor about taking a mild laxative. Medicines    · Your doctor will tell you if and when you can restart your medicines. He or she will also give you instructions about taking any new medicines.     · If you take aspirin or some other blood thinner, ask your doctor if and when to start taking it again. Make sure that you understand exactly what your doctor wants you to do.     · Take pain medicines exactly as directed. ? If the doctor gave you a prescription medicine for pain, take it as prescribed. ? If you are not taking a prescription pain medicine, take an over-the-counter medicine that your doctor recommends. Read and follow all instructions on the label. ? Do not take aspirin, ibuprofen (Advil, Motrin), naproxen (Aleve), or other nonsteroidal anti-inflammatory drugs (NSAIDs) unless your doctor says it is okay.     · If you think your pain medicine is making you sick to your stomach:  ? Take your medicine after meals (unless your doctor has told you not to). ? Ask your doctor for a different pain medicine.     · If your doctor prescribed antibiotics, take them as directed. Do not stop taking them just because you feel better. You need to take the full course of antibiotics. Incision care    · If you have strips of tape on the incision, leave the tape on for a week or until it falls off.     · Wash the area daily with warm, soapy water and pat it dry. Don't use hydrogen peroxide or alcohol, which can slow healing. You may cover the area with a gauze bandage if it weeps or rubs against clothing. Change the bandage every day.     · Keep the area clean and dry. Follow-up care is a key part of your treatment and safety. Be sure to make and go to all appointments, and call your doctor if you are having problems. It's also a good idea to know your test results and keep a list of the medicines you take. When should you call for help?    Call 911 anytime you think you may need emergency care. For example, call if:    · You passed out (lost consciousness).     · You have chest pain, are short of breath, or cough up blood. Call your doctor now or seek immediate medical care if:    · You have pain that does not get better after you take your pain medicine.     · You have symptoms of a urinary tract infection. These may include:  ? Pain or burning when you urinate. ? A frequent need to urinate without being able to pass much urine. ? Pain in the flank, which is just below the rib cage and above the waist on either side of the back. ? Blood in the urine. ? A fever.     · You have signs of infection, such as:  ? Increased pain, swelling, warmth, or redness. ? Red streaks leading from the incisions. ? Pus draining from the incisions. ? A fever.     · You have loose stitches, or your incisions come open.     · You are bleeding from the incisions.     · You cannot urinate.     · You are sick to your stomach or cannot drink fluids.     · You have signs of a blood clot in your leg (called a deep vein thrombosis), such as:  ? Pain in the calf, back of the knee, thigh, or groin. ? Redness and swelling in your leg. Watch closely for changes in your health, and be sure to contact your doctor if you are having any problems. Where can you learn more? Go to http://www.gray.com/  Enter Z528 in the search box to learn more about \"Open Nephrectomy: What to Expect at Home. \"  Current as of: December 17, 2020               Content Version: 13.0  © 2006-2021 Healthwise[employer] Inc[employer]. Care instructions adapted under license by [employer] (which disclaims liability or warranty for this information). If you have questions about a medical condition or this instruction, always ask your healthcare professional. [place] any warranty or liability for your use of this information.          Patient Education        Kidney Stone: Care Instructions  Your Care Instructions     Kidney stones are formed when salts, minerals, and other substances normally found in the urine clump together. They can be as small as grains of sand or, rarely, as large as golf balls. While the stone is traveling through the ureter, which is the tube that carries urine from the kidney to the bladder, you will probably feel pain. The pain may be mild or very severe. You may also have some blood in your urine. As soon as the stone reaches the bladder, any intense pain should go away. If a stone is too large to pass on its own, you may need a medical procedure to help you pass the stone. The doctor has checked you carefully, but problems can develop later. If you notice any problems or new symptoms, get medical treatment right away. Follow-up care is a key part of your treatment and safety. Be sure to make and go to all appointments, and call your doctor if you are having problems. It's also a good idea to know your test results and keep a list of the medicines you take. How can you care for yourself at home? · Drink plenty of fluids. If you have kidney, heart, or liver disease and have to limit fluids, talk with your doctor before you increase the amount of fluids you drink. · Take pain medicines exactly as directed. Call your doctor if you think you are having a problem with your medicine. ? If the doctor gave you a prescription medicine for pain, take it as prescribed. ? If you are not taking a prescription pain medicine, ask your doctor if you can take an over-the-counter medicine. Read and follow all instructions on the label. · Your doctor may ask you to strain your urine so that you can collect your kidney stone when it passes. You can use a kitchen strainer or a tea strainer to catch the stone. Store it in a plastic bag until you see your doctor again. Preventing future kidney stones  Some changes in your diet may help prevent kidney stones.  Depending on the cause of your stones, your doctor may recommend that you:  · Drink plenty of fluids. If you have kidney, heart, or liver disease and have to limit fluids, talk with your doctor before you increase the amount of fluids you drink. · Limit coffee, tea, and alcohol. Also avoid grapefruit juice. · Do not take more than the recommended daily dose of vitamins C and D.  · Avoid antacids such as Gaviscon, Maalox, Mylanta, or Tums. · Limit the amount of salt (sodium) in your diet. · Eat a balanced diet that is not too high in protein. · Limit foods that are high in a substance called oxalate, which can cause kidney stones. These foods include dark green vegetables, rhubarb, chocolate, wheat bran, nuts, cranberries, and beans. When should you call for help? Call your doctor now or seek immediate medical care if:    · You cannot keep down fluids.     · Your pain gets worse.     · You have a fever or chills.     · You have new or worse pain in your back just below your rib cage (the flank area).     · You have new or more blood in your urine. Watch closely for changes in your health, and be sure to contact your doctor if:    · You do not get better as expected. Where can you learn more? Go to http://www.gray.com/  Enter Z326 in the search box to learn more about \"Kidney Stone: Care Instructions. \"  Current as of: December 17, 2020               Content Version: 13.0  © 2006-2021 [employer]. Care instructions adapted under license by [email or web address] (which disclaims liability or warranty for this information). If you have questions about a medical condition or this instruction, always ask your healthcare professional. [place] [address] any warranty or liability for your use of this information.          Patient Education        Kidney Infection: Care Instructions  Your Care Instructions     A kidney infection (pyelonephritis) is a type of urinary tract infection, or UTI. Most UTIs are bladder infections. Kidney infections tend to make people much sicker than bladder infections do. A kidney infection is also more serious because it can cause lasting damage if it is not treated quickly. Follow-up care is a key part of your treatment and safety. Be sure to make and go to all appointments, and call your doctor if you are having problems. It's also a good idea to know your test results and keep a list of the medicines you take. How can you care for yourself at home? · Take your antibiotics as directed. Do not stop taking them just because you feel better. You need to take the full course of antibiotics. · Drink plenty of water. This may help wash out bacteria that are causing the infection. If you have kidney, heart, or liver disease and have to limit fluids, talk with your doctor before you increase the amount of fluids you drink. · Urinate often. Try to empty your bladder each time. · To relieve pain, take a hot shower or lay a heating pad (set on low) over your lower belly. Never go to sleep with a heating pad in place. Put a thin cloth between the heating pad and your skin. To help prevent kidney infections  · Drink plenty of water each day. This helps you urinate often, which clears bacteria from your system. If you have kidney, heart, or liver disease and have to limit fluids, talk with your doctor before you increase the amount of fluids you drink. · Urinate when you have the urge. Do not hold your urine for a long time. Urinate before you go to sleep. · If you have symptoms of a bladder infection, such as burning when you urinate or having to urinate often, call your doctor so you can treat the problem before it gets worse. If you do not treat a bladder infection quickly, it can spread to the kidney. · Men should keep the tip of the penis clean. If you are a woman, keep these ideas in mind:  · Urinate right after you have sex.   · Change sanitary pads often. Avoid douches, feminine hygiene sprays, and other feminine hygiene products that have deodorants. · After going to the bathroom, wipe from front to back. When should you call for help? Call your doctor now or seek immediate medical care if:    · You have symptoms that a kidney infection is getting worse. These may include:  ? Pain or burning when you urinate. ? A frequent need to urinate without being able to pass much urine. ? Pain in the flank, which is just below the rib cage and above the waist on either side of the back. ? Blood in the urine. ? A fever.     · You are vomiting or nauseated. Watch closely for changes in your health, and be sure to contact your doctor if:    · You do not get better as expected. Where can you learn more? Go to http://www.gray.com/  Enter X780 in the search box to learn more about \"Kidney Infection: Care Instructions. \"  Current as of: December 17, 2020               Content Version: 13.0  © 2006-2021 [employer]. Care instructions adapted under license by [employer] (which disclaims liability or warranty for this information). If you have questions about a medical condition or this instruction, always ask your healthcare professional. [place] [address] any warranty or liability for your use of this information. Patient Education        Ureteral Stent Placement: Before Your Procedure  What is ureteral stent placement? A ureteral (say \"you-REE-ter-ul\") stent is a thin, hollow tube. It is placed in the ureter to help urine pass from the kidney into the bladder. Ureters are the tubes that connect the kidneys to the bladder. This procedure is done when something is blocking the ureter. The blockage may be caused by problems such as a kidney stone, a tumor, or an infection. The stent keeps the ureter open.  After the stent is placed, urine should flow better from your kidneys to your bladder. You will get medicine to make you sleep and to prevent pain during the procedure. The doctor will place the stent by guiding it up the urethra. The urethra is the tube that carries urine from the bladder to outside the body. Then the doctor will pass the stent through the bladder and ureter into the kidney. The doctor will place one end of the stent in the kidney and the other end in the bladder. The stent may be left in place for several days. Or you may have it in place for several months. While the stent is in place, you may have to urinate more often. You may feel a sudden need to urinate. Or you may feel like you can't completely empty your bladder. Follow-up care is a key part of your treatment and safety. Be sure to make and go to all appointments, and call your doctor if you are having problems. It's also a good idea to know your test results and keep a list of the medicines you take. How do you prepare for the procedure? Procedures can be stressful. This information will help you understand what you can expect. And it will help you safely prepare for your procedure. Preparing for the procedure    · Be sure you have someone to take you home. Anesthesia and pain medicine will make it unsafe for you to drive or get home on your own.     · Understand exactly what procedure is planned, along with the risks, benefits, and other options.     · If you take aspirin or some other blood thinner, ask your doctor if you should stop taking it before your procedure. Make sure that you understand exactly what your doctor wants you to do. These medicines increase the risk of bleeding.     · Tell your doctor ALL the medicines, vitamins, supplements, and herbal remedies you take. Some may increase the risk of problems during your procedure.  Your doctor will tell you if you should stop taking any of them before the procedure and how soon to do it.     · Make sure your doctor and the hospital have a copy of your advance directive. If you don't have one, you may want to prepare one. It lets others know your health care wishes. It's a good thing to have before any type of surgery or procedure. What happens on the day of the procedure? · Follow the instructions exactly about when to stop eating and drinking. If you don't, your procedure may be canceled. If your doctor told you to take your medicines on the day of the procedure, take them with only a sip of water.     · Take a bath or shower before you come in for your procedure. Do not apply lotions, perfumes, deodorants, or nail polish.     · Take off all jewelry and piercings. And take out contact lenses, if you wear them. At the hospital or surgery center   · Bring a picture ID.     · You will be kept comfortable and safe by your anesthesia provider. You will be asleep during the procedure.     · The procedure will take about 30 to 60 minutes. When should you call your doctor? · You have questions or concerns.     · You don't understand how to prepare for your procedure.     · You become ill before the procedure (such as fever, flu, or a cold).     · You need to reschedule or have changed your mind about having the procedure. Where can you learn more? Go to http://www.gray.com/  Enter Q169 in the search box to learn more about \"Ureteral Stent Placement: Before Your Procedure. \"  Current as of: February 10, 2021               Content Version: 13.0  © 7738-2366 Healthwise, Incorporated. Care instructions adapted under license by [employer] (which disclaims liability or warranty for this information). If you have questions about a medical condition or this instruction, always ask your healthcare professional. [place] [address] any warranty or liability for your use of this information.

## 2021-12-21 NOTE — PROGRESS NOTES
Spoke with Dr. Lenka Sands, he stated that he is coming bedside to address some of the patient's questions/concerns prior to discharging.

## 2021-12-21 NOTE — PROGRESS NOTES
Renal Daily Progress Note    Admit Date: 12/6/2021      Subjective:   He was seen  today. No intervention on cardiac catheterization. Creatinine is 1.9 mg today.     Current Facility-Administered Medications   Medication Dose Route Frequency    sodium chloride (NS) flush 5-40 mL  5-40 mL IntraVENous Q8H    sodium chloride (NS) flush 5-40 mL  5-40 mL IntraVENous PRN    clopidogreL (PLAVIX) tablet 75 mg  75 mg Oral DAILY    0.9% sodium chloride infusion  75 mL/hr IntraVENous CONTINUOUS    lactulose (CHRONULAC) 10 gram/15 mL solution 30 mL  20 g Oral Q4H PRN    losartan (COZAAR) tablet 25 mg  25 mg Oral DAILY    levothyroxine (SYNTHROID) tablet 200 mcg  200 mcg Oral ACB    allopurinoL (ZYLOPRIM) tablet 300 mg  300 mg Oral DAILY    hydroCHLOROthiazide (HYDRODIURIL) tablet 25 mg  25 mg Oral DAILY    heparin (porcine) injection 5,000 Units  5,000 Units SubCUTAneous Q8H    pantoprazole (PROTONIX) tablet 40 mg  40 mg Oral ACB    tamsulosin (FLOMAX) capsule 0.4 mg  0.4 mg Oral DAILY    atorvastatin (LIPITOR) tablet 80 mg  80 mg Oral DAILY    sodium chloride (NS) flush 5-40 mL  5-40 mL IntraVENous PRN    acetaminophen (TYLENOL) tablet 650 mg  650 mg Oral Q6H PRN    Or    acetaminophen (TYLENOL) suppository 650 mg  650 mg Rectal Q6H PRN    polyethylene glycol (MIRALAX) packet 17 g  17 g Oral DAILY PRN    ondansetron (ZOFRAN ODT) tablet 4 mg  4 mg Oral Q8H PRN    Or    ondansetron (ZOFRAN) injection 4 mg  4 mg IntraVENous Q6H PRN    HYDROmorphone (DILAUDID) syringe 0.5 mg  0.5 mg IntraVENous Q4H PRN    sodium chloride (NS) flush 5-40 mL  5-40 mL IntraVENous Q8H    cefTRIAXone (ROCEPHIN) 2 g in 0.9% sodium chloride (MBP/ADV) 50 mL MBP  2 g IntraVENous ACB    glucose chewable tablet 16 g  4 Tablet Oral PRN    dextrose (D50W) injection syrg 12.5-25 g  25-50 mL IntraVENous PRN    glucagon (GLUCAGEN) injection 1 mg  1 mg IntraMUSCular PRN    insulin lispro (HUMALOG) injection   SubCUTAneous AC&HS    aspirin delayed-release tablet 81 mg  81 mg Oral DAILY        Review of Systems    Review of Systems   Respiratory: Negative for shortness of breath. Cardiovascular: Negative for chest pain and leg swelling. Gastrointestinal: Negative for abdominal pain and vomiting. Neurological: Negative for headaches. Objective:     Patient Vitals for the past 8 hrs:   BP Temp Pulse Resp SpO2   12/21/21 1431 106/65 97.3 °F (36.3 °C) 83 16 95 %   12/21/21 1140 109/68 98.5 °F (36.9 °C) 66 16 95 %   12/21/21 0805 114/70 98.7 °F (37.1 °C) 63 16 93 %   12/21/21 0800 -- -- -- -- 95 %     No intake/output data recorded. 12/19 1901 - 12/21 0700  In: -   Out: 1000 [Urine:1000]    Physical Exam:   Physical Exam  Cardiovascular:      Rate and Rhythm: Regular rhythm. Pulmonary:      Breath sounds: Normal breath sounds. Abdominal:      General: Bowel sounds are normal.      Palpations: Abdomen is soft. Musculoskeletal:         General: No swelling. Neurological:      Mental Status: He is alert. No CVA tenderness. XR ABD (KUB)   Final Result      XR CHEST PORT   Final Result   No acute findings. XR FLUOROSCOPY UNDER 60 MINUTES   Final Result      CT ABD PELV WO CONT   Final Result   1. 5 x 5 mm obstructing stone in the distal left ureter. Left perinephric   inflammation. Correlate to medical renal disease to include renal dysfunction   and pyelonephritis. 2. Nonobstructing bilateral nephrolithiasis. 3. Thickening versus nondistention of the proximal duodenum. Correlate for any   clinical symptoms. 4. Trace bilateral pleural effusions. 5. Hepatomegaly. 6. Other findings above. XR CHEST PORT   Final Result           Data Review   No results for input(s): WBC, HGB, HGBEXT, HCT, HCTEXT, PLT, PLTEXT, HGBEXT, HCTEXT, PLTEXT, HGBEXT, HCTEXT, PLTEXT in the last 72 hours.   Recent Labs     12/21/21  0817 12/20/21  0758 12/19/21  1151    138 137   K 4.3 4.2 4.0    103 100   CO2 30 30 32 * 108* 156*   BUN 21* 23* 26*   CREA 1.94* 1.74* 1.99*   CA 9.2 8.8 8.8     No components found for: GLPOC  No results for input(s): PH, PCO2, PO2, HCO3, FIO2 in the last 72 hours. No results for input(s): INR, INREXT, INREXT, INREXT in the last 72 hours. Assessment:           Active Problems:    Pyelonephritis (12/6/2021)      FERMIN (acute kidney injury) ([facility] [address] (12/16/2021)    FERMIN improving. Left-sided ureteral stone s/p stent    Diabetes mellitus    NSTEMI for cardiac catheterization today. Hyperlipidemia    Nephrolithiasis. Plan:     Okay for discharge from a renal standpoint. Advised to increase fluid intake with added lemon with water. Will need 24-hour urine collection for stone work-up as an outpatient. We will see him  in the office in about 4 weeks.

## 2021-12-21 NOTE — PROGRESS NOTES
Cardiology Progress Note      12/21/2021 10:38 AM    Admit Date: 12/6/2021    Admit Diagnosis: Pyelonephritis [N12]      Subjective:   Patient seen and examined. He remains chest pain-free. No overnight events.     Visit Vitals  /65 (BP 1 Location: Left upper arm, BP Patient Position: At rest)   Pulse 83   Temp 97.3 °F (36.3 °C)   Resp 16   Ht 6' 9\" (2.057 m)   Wt 107 kg (236 lb)   SpO2 95%   BMI 25.29 kg/m²     Current Facility-Administered Medications   Medication Dose Route Frequency    sodium chloride (NS) flush 5-40 mL  5-40 mL IntraVENous Q8H    sodium chloride (NS) flush 5-40 mL  5-40 mL IntraVENous PRN    clopidogreL (PLAVIX) tablet 75 mg  75 mg Oral DAILY    0.9% sodium chloride infusion  75 mL/hr IntraVENous CONTINUOUS    lactulose (CHRONULAC) 10 gram/15 mL solution 30 mL  20 g Oral Q4H PRN    losartan (COZAAR) tablet 25 mg  25 mg Oral DAILY    levothyroxine (SYNTHROID) tablet 200 mcg  200 mcg Oral ACB    allopurinoL (ZYLOPRIM) tablet 300 mg  300 mg Oral DAILY    hydroCHLOROthiazide (HYDRODIURIL) tablet 25 mg  25 mg Oral DAILY    heparin (porcine) injection 5,000 Units  5,000 Units SubCUTAneous Q8H    pantoprazole (PROTONIX) tablet 40 mg  40 mg Oral ACB    tamsulosin (FLOMAX) capsule 0.4 mg  0.4 mg Oral DAILY    atorvastatin (LIPITOR) tablet 80 mg  80 mg Oral DAILY    sodium chloride (NS) flush 5-40 mL  5-40 mL IntraVENous PRN    acetaminophen (TYLENOL) tablet 650 mg  650 mg Oral Q6H PRN    Or    acetaminophen (TYLENOL) suppository 650 mg  650 mg Rectal Q6H PRN    polyethylene glycol (MIRALAX) packet 17 g  17 g Oral DAILY PRN    ondansetron (ZOFRAN ODT) tablet 4 mg  4 mg Oral Q8H PRN    Or    ondansetron (ZOFRAN) injection 4 mg  4 mg IntraVENous Q6H PRN    HYDROmorphone (DILAUDID) syringe 0.5 mg  0.5 mg IntraVENous Q4H PRN    sodium chloride (NS) flush 5-40 mL  5-40 mL IntraVENous Q8H    cefTRIAXone (ROCEPHIN) 2 g in 0.9% sodium chloride (MBP/ADV) 50 mL MBP  2 g IntraVENous ACB  glucose chewable tablet 16 g  4 Tablet Oral PRN    dextrose (D50W) injection syrg 12.5-25 g  25-50 mL IntraVENous PRN    glucagon (GLUCAGEN) injection 1 mg  1 mg IntraMUSCular PRN    insulin lispro (HUMALOG) injection   SubCUTAneous AC&HS    aspirin delayed-release tablet 81 mg  81 mg Oral DAILY         Objective:      Physical Exam:  Visit Vitals  /65 (BP 1 Location: Left upper arm, BP Patient Position: At rest)   Pulse 83   Temp 97.3 °F (36.3 °C)   Resp 16   Ht 6' 9\" (2.057 m)   Wt 107 kg (236 lb)   SpO2 95%   BMI 25.29 kg/m²     General Appearance:  Well developed, well nourished,alert and oriented x 3, and individual in no acute distress. Ears/Nose/Mouth/Throat:   Hearing grossly normal.         Neck: Supple. Chest:   Lungs clear to auscultation bilaterally. Cardiovascular:  Regular rate and rhythm, S1, S2 normal, no murmur. Abdomen:   Soft, non-tender, bowel sounds are active. Extremities: No edema bilaterally. Skin: Warm and dry.                Data Review:   Labs:    Recent Results (from the past 24 hour(s))   GLUCOSE, POC    Collection Time: 12/20/21  6:00 PM   Result Value Ref Range    Glucose (POC) 109 65 - 117 mg/dL    Performed by Shanae Ríos, POC    Collection Time: 12/20/21  7:35 PM   Result Value Ref Range    Glucose (POC) 178 (H) 65 - 117 mg/dL    Performed by Austin Cranker    GLUCOSE, POC    Collection Time: 12/21/21  7:28 AM   Result Value Ref Range    Glucose (POC) 99 65 - 117 mg/dL    Performed by [address] [place] [address], BASIC    Collection Time: 12/21/21  8:17 AM   Result Value Ref Range    Sodium 136 136 - 145 mmol/L    Potassium 4.3 3.5 - 5.1 mmol/L    Chloride 102 97 - 108 mmol/L    CO2 30 21 - 32 mmol/L    Anion gap 4 (L) 5 - 15 mmol/L    Glucose 110 (H) 65 - 100 mg/dL    BUN 21 (H) 6 - 20 mg/dL    Creatinine 1.94 (H) 0.70 - 1.30 mg/dL    BUN/Creatinine ratio 11 (L) 12 - 20      GFR est AA 43 (L) >60 ml/min/1.73m2    GFR est non-AA 35 (L) >60 ml/min/1.73m2    Calcium 9.2 8.5 - 10.1 mg/dL   GLUCOSE, POC    Collection Time: 12/21/21 11:08 AM   Result Value Ref Range    Glucose (POC) 172 (H) 65 - 117 mg/dL    Performed by Mirtha Hililard        Telemetry: normal sinus rhythm      Assessment:   NSTEMI type II  Pyelonephritis status post ureteral stent  Acute kidney injury  Diabetes mellitus  Hypertension  Hyperlipidemia    Plan:   28-year-old male with a past medical history as above who is seen for elevated troponins after undergoing ureteral stent placement.  -Patient has had no clear anginal symptoms but he has significantly elevated troponins which are now downtrending. I reviewed his transthoracic echocardiogram which shows mild LV dysfunction with lateral, anterolateral, inferolateral wall motion abnormality consistent with coronary artery disease in the left circumflex/right coronary artery territory  -Renal function is improving now. -Continue aspirin, high intensity statin. Add beta-blockers as hemodynamically able. Resting heart rate is in the 50s hence I am reluctant to start this yet  -Cardiac cath via [address] without incident; please refer to cath report for details. He has a 100% occlusion of a small caliber OM2 (culprit) not amenable to PCI hence should be managed medically. He also has a 50-60% focal stenosis of the ostial D1 (non-culprit). This is a large caliber vessel Bee Tate 0,0,1 bifurcation) which I have recommended medical management for in the absence of anginal symptoms and with normal LVEF. Aggressive risk factor modification. We will start him on dual antiplatelet therapy which we will continue for now.  -Post-procedure hydration  -I will see him in the office for follow up on 1/10/2022 at 145 pm. Appt details in discharge instructions.   -Renal function is stable today. He is okay for discharge from the cardiac standpoint.

## 2021-12-21 NOTE — TELEPHONE ENCOUNTER
This patient needs a KUB prior to his appointment with Dr. Flakito Andrews. Please remind him when you are scheduling appointment and let me know when that is done. I will order the XRAY. He is being d/c from hospital today it seems so they will call to schedule appointment here.     Retained ureteral stent  Kidney/ureteral stone  Hospital follow up

## 2022-01-03 PROBLEM — Z96.0 RETAINED URETERAL STENT: Status: ACTIVE | Noted: 2022-01-03

## 2022-01-03 PROBLEM — N35.919 URETHRAL STRICTURE: Status: ACTIVE | Noted: 2022-01-03

## 2022-01-03 PROBLEM — N20.1 URETERAL STONE: Status: ACTIVE | Noted: 2022-01-03

## 2022-01-03 PROBLEM — I21.4 NSTEMI (NON-ST ELEVATED MYOCARDIAL INFARCTION) (HCC): Status: ACTIVE | Noted: 2022-01-03

## 2022-01-03 PROBLEM — C61 PROSTATE CANCER (HCC): Status: ACTIVE | Noted: 2022-01-03

## 2022-01-03 PROBLEM — N20.0 KIDNEY STONE: Status: ACTIVE | Noted: 2022-01-03

## 2022-01-05 ENCOUNTER — OFFICE VISIT (OUTPATIENT)
Dept: UROLOGY | Age: 62
End: 2022-01-05
Payer: COMMERCIAL

## 2022-01-05 VITALS
BODY MASS INDEX: 27.31 KG/M2 | TEMPERATURE: 98.5 F | SYSTOLIC BLOOD PRESSURE: 139 MMHG | RESPIRATION RATE: 22 BRPM | HEIGHT: 78 IN | DIASTOLIC BLOOD PRESSURE: 77 MMHG | HEART RATE: 81 BPM | WEIGHT: 236 LBS | OXYGEN SATURATION: 98 %

## 2022-01-05 DIAGNOSIS — Z96.0 RETAINED URETERAL STENT: Primary | ICD-10-CM

## 2022-01-05 DIAGNOSIS — N12 PYELONEPHRITIS: ICD-10-CM

## 2022-01-05 DIAGNOSIS — C61 PROSTATE CANCER (HCC): ICD-10-CM

## 2022-01-05 DIAGNOSIS — N20.1 URETERAL STONE: ICD-10-CM

## 2022-01-05 DIAGNOSIS — N20.0 KIDNEY STONE: ICD-10-CM

## 2022-01-05 DIAGNOSIS — I21.4 NSTEMI (NON-ST ELEVATED MYOCARDIAL INFARCTION) (HCC): ICD-10-CM

## 2022-01-05 DIAGNOSIS — N35.919 STRICTURE OF MALE URETHRA, UNSPECIFIED STRICTURE TYPE: ICD-10-CM

## 2022-01-05 LAB
BILIRUB UR QL STRIP: NEGATIVE
GLUCOSE UR-MCNC: NEGATIVE MG/DL
KETONES P FAST UR STRIP-MCNC: NEGATIVE MG/DL
PH UR STRIP: 6 [PH] (ref 4.6–8)
PROT UR QL STRIP: NORMAL
SP GR UR STRIP: 1.02 (ref 1–1.03)
UA UROBILINOGEN AMB POC: NORMAL (ref 0.2–1)
URINALYSIS CLARITY POC: CLEAR
URINALYSIS COLOR POC: YELLOW
URINE BLOOD POC: NORMAL
URINE LEUKOCYTES POC: NORMAL
URINE NITRITES POC: NEGATIVE

## 2022-01-05 PROCEDURE — 99214 OFFICE O/P EST MOD 30 MIN: CPT | Performed by: UROLOGY

## 2022-01-05 PROCEDURE — 81003 URINALYSIS AUTO W/O SCOPE: CPT | Performed by: UROLOGY

## 2022-01-05 NOTE — PROGRESS NOTES
1. Have you been to the ER, urgent care clinic since your last visit? Hospitalized since your last visit? No    2. Have you seen or consulted any other health care providers outside of the [address] [place] [address] since your last visit? Include any pap smears or colon screening.  PCP  Chief Complaint   Patient presents with    New Patient    Urinary Retention    Kidney Stone     Visit Vitals  /77 (BP 1 Location: Left arm, BP Patient Position: Sitting, BP Cuff Size: Adult)   Pulse 81   Temp 98.5 °F (36.9 °C) (Temporal)   Resp 22   Ht 6' 10\" (2.083 m)   Wt 236 lb (107 kg)   SpO2 98%   BMI 24.68 kg/m²

## 2022-01-05 NOTE — PROGRESS NOTES
HISTORY OF PRESENT ILLNESS  Arnaldo Morales is a 59 y.o. male. Chief Complaint   Patient presents with    New Patient    Urinary Retention    Kidney Stone     24-year-old male with history of hypertension, diabetes and hypothyroidism presented to ED on 12/6 with left flank pain associated with fever/chills along with nausea/vomiting. CT showed 5 mm obstructing stone in the distal left ureter with left perinephric inflammation. Patient was started on ceftriaxone for sepsis, admitted to ICU, underwent cystoscopy with left ureteral stent on 12/6. Patient was treated with Ceftriaxone for 14 days while he was in the hospital.      He occasionally feels some discomfort in the suprapubic area. He has no flank pain. He has no hematuria except for 1 day when he had severe straining and constipation for several hours. Patient was also however found to have elevated troponin, cardiology was consulted. Patient had no clear anginal symptoms but had significantly elevated troponin, ECHO with  mild LV dysfunction with lateral, anterolateral, inferolateral wall motion abnormality consistent with coronary artery disease in the left circumflex/right coronary artery territory, it was decided to proceed with cardiac catheterization. He has a 100% occlusion of a small caliber OM2 (culprit) not amenable to PCI hence should be managed medically. He also has a 50-60% focal stenosis of the ostial D1 (non-culprit). This is a large caliber vessel Macy [place] 0,0,1 bifurcation) recommended was medical management for in the absence of anginal symptoms and with normal LVEF. Patient was started on dual anti platelet therapy. He was started on tamsulosin. He started feeling lightheaded and wondered if it was medication. He stopped more than 3 days ago when he started feeling a little lightheaded when he initially wakes up and stands up. The symptoms have not changed. KUB was done today. I reviewed the images.   It reveals his left ureteral stone is persistent in the upper portion of his ureter alongside the stent. I measured the about 5 mm. On the right side he has a persistent radiopaque renal calculus about 6 mm. Chronic Conditions Addressed Today     1. Pyelonephritis     Overview      12/6/21: [facility] and [facility]ors, acute pyelonephritis and acute kidney injury. He had left lower abdominal and flank pain. He had fever and tachycardia. Reportedly his temperature was 102.9. CT reveals a 5 mm distal left ureteral stone with perinephric stranding. He is s/p cystoscopy, left retrograde pyelogram, ureteral stent placement, direct vision internal urethrotomy on 12/6/21. Patient was treated with Ceftriaxone for 14 days while he was in the hospital.                Current Assessment & Plan      Clinical pyelonephritis however urine and blood cultures without growth. Unclear if he received antibiotic treatment prior to specimen collection. 2. Retained ureteral stent - Primary     Overview      He is s/p cystoscopy, left retrograde pyelogram, ureteral stent placement, direct vision internal urethrotomy on 12/6/21.               Current Assessment & Plan       Placed for obstructing ureteral stone and presumed pyelonephritis. He is not symptomatic. Relevant Orders     XR ABD (KUB)     AMB POC URINALYSIS DIP STICK AUTO W/O MICRO (Completed)    3. Prostate cancer ([address]     Overview      HX of prostate cancer status post prostatectomy 11 years ago in [facility] he states he is remission. 4. Ureteral stone     Overview      CT 12/6/21 showed a 5 mm distal left ureteral stone with perinephric stranding. He is s/p cystoscopy, left retrograde pyelogram, ureteral stent placement, direct vision internal urethrotomy on 12/6/21. KUB 12/9/21: Ureteral stent traverses the left renal collecting system.    Adjacent to ureteral stent stent at the L3-L4 level, there is 3-4 mm  calcification suggesting ureteral stone.   7 mm calcification overlies right mid kidney, likely corresponding to  nonobstructing mid calyceal stone. Current Assessment & Plan      Repeat KUB today reveals his proximal ureteral stone to be unchanged, approximately 5 mm. He will need elective treatment of his stone, ideally within the next 3 to 6 months. I will have him follow-up with cardiology to see what would be prudent in terms of timing getting off of his antiplatelet therapy. Relevant Orders     XR ABD (KUB)     AMB POC URINALYSIS DIP STICK AUTO W/O MICRO (Completed)    5. Kidney stone     Overview      CT 12/6/21: Left hydronephrosis and hydroureter secondary to a 5 x 5 mm stone in the distal left ureter. Nonobstructing punctate left renal calcification. Multiple nonobstructing right renal calcifications, the largest 8.6 mm. KUB 12/9/21: Ureteral stent traverses the left renal collecting system. Adjacent to ureteral stent stent at the L3-L4 level, there is 3-4 mm  calcification suggesting ureteral stone.   7 mm calcification overlies right mid kidney, likely corresponding to  nonobstructing mid calyceal stone. Current Assessment & Plan       Bilateral stones. Left ureter stented for a 3-5mm stone. Right 7mm renal stone nonobstructing. KUB today          Relevant Orders     AMB POC URINALYSIS DIP STICK AUTO W/O MICRO (Completed)    6. Urethral stricture     Overview      Fossa navicularis and mid urethral strictures, s/p DVIU on 12/6/21. Current Assessment & Plan      S/p DVIU 12/6/21. Relevant Orders     AMB POC URINALYSIS DIP STICK AUTO W/O MICRO (Completed)    7. NSTEMI (non-ST elevated myocardial infarction) ([facility] [address]     Overview      NSTEMI s/p cardiac catheterization 12/2021. Current Assessment & Plan      NSTEMI. On antiplatelet therapy. He reports some lightheadedness with standing. Potentially increased cardiac risk.   I will have him follow-up with Dr. Dyana Blas, the cardiologist that saw him at [facility]. Past Medical History:    PMHx (including negatives):  has a past medical history of Diabetes ([facility] [address] Gout, Hypertension, Hypothyroidism, Prostate cancer ([facility] [address] and Renal stones. PSurgHx:  has a past surgical history that includes hx prostate surgery; hx urological (12/06/2021); hx urological (12/06/2021); hx urological (12/06/2021); hx urological (12/06/2021); hx hernia repair; and hx orthopaedic. PSocHx:  reports that he has never smoked. He has never used smokeless tobacco. He reports previous alcohol use. He reports that he does not use drugs. ROS  Physical Exam  Constitutional:       General: He is not in acute distress. Appearance: He is not ill-appearing. Neurological:      Mental Status: He is alert. No Known Allergies   Prior to Admission medications    Medication Sig Start Date End Date Taking? Authorizing Provider   aspirin delayed-release 81 mg tablet Take 1 Tablet by mouth daily. 12/22/21  Yes Denny Baca MD   atorvastatin (LIPITOR) 80 mg tablet Take 1 Tablet by mouth daily. 12/22/21  Yes Denny Baca MD   pantoprazole (PROTONIX) 40 mg tablet Take 1 Tablet by mouth Daily (before breakfast). 12/22/21  Yes Denny Baca MD   hydroCHLOROthiazide (HYDRODIURIL) 25 mg tablet Take 1 Tablet by mouth daily. 12/22/21  Yes Denny Baca MD   clopidogreL (PLAVIX) 75 mg tab Take 1 Tablet by mouth daily. 12/22/21  Yes Denny Baca MD   allopurinoL (ZYLOPRIM) 300 mg tablet Take 300 mg by mouth daily. Yes Provider, Historical   losartan (COZAAR) 25 mg tablet Take 25 mg by mouth daily. Yes Provider, Historical   levothyroxine (SYNTHROID) 200 mcg tablet Take 200 mcg by mouth every morning. 11/26/21  Yes Provider, Historical   tamsulosin (FLOMAX) 0.4 mg capsule Take 1 Capsule by mouth daily.   Patient not taking: Reported on 1/5/2022 12/22/21   Denny Baca MD        ASSESSMENT and PLAN  Diagnoses and all orders for this visit:    1. Retained ureteral stent  Assessment & Plan:   Placed for obstructing ureteral stone and presumed pyelonephritis. He is not symptomatic. Orders:  -     XR ABD (KUB); Future  -     AMB POC URINALYSIS DIP STICK AUTO W/O MICRO    2. Ureteral stone  Assessment & Plan:  Repeat KUB today reveals his proximal ureteral stone to be unchanged, approximately 5 mm. He will need elective treatment of his stone, ideally within the next 3 to 6 months. I will have him follow-up with cardiology to see what would be prudent in terms of timing getting off of his antiplatelet therapy. Orders:  -     XR ABD (KUB); Future  -     AMB POC URINALYSIS DIP STICK AUTO W/O MICRO    3. Kidney stone  Assessment & Plan:   Bilateral stones. Left ureter stented for a 3-5mm stone. Right 7mm renal stone nonobstructing. KUB today    Orders:  -     AMB POC URINALYSIS DIP STICK AUTO W/O MICRO    4. Pyelonephritis  Assessment & Plan:  Clinical pyelonephritis however urine and blood cultures without growth. Unclear if he received antibiotic treatment prior to specimen collection. 5. Prostate cancer ([facility] [address]    6. Stricture of male urethra, unspecified stricture type  Assessment & Plan:  S/p DVIU 12/6/21. Orders:  -     AMB POC URINALYSIS DIP STICK AUTO W/O MICRO    7. NSTEMI (non-ST elevated myocardial infarction) [facility])  Assessment & Plan:  NSTEMI. On antiplatelet therapy. He reports some lightheadedness with standing. Potentially increased cardiac risk. I will have him follow-up with Dr. Ashish Napoles, the cardiologist that saw him at [facility].            Buck Chris MD

## 2022-01-05 NOTE — LETTER
1/5/2022    Patient: Colten Osman   YOB: 1960   Date of Visit: 1/5/2022     Tyler Avila MD  [address] [facility] [address]  Via Fax: [phone]     Sofia Walls MD  [facility] [address]  [address] [place] [address]    Dear MD Sofia De Souza MD,      Thank you for referring Mr. Ana Alves to  [facility] for evaluation. My notes for this consultation are attached. If you have questions, please do not hesitate to call me. I look forward to following your patient along with you.       Sincerely,    Jayna Herrera MD

## 2022-01-05 NOTE — ASSESSMENT & PLAN NOTE
Bilateral stones. Left ureter stented for a 3-5mm stone. Right 7mm renal stone nonobstructing.   KUB today

## 2022-01-05 NOTE — ASSESSMENT & PLAN NOTE
Clinical pyelonephritis however urine and blood cultures without growth. Unclear if he received antibiotic treatment prior to specimen collection.

## 2022-01-05 NOTE — ASSESSMENT & PLAN NOTE
Repeat KUB today reveals his proximal ureteral stone to be unchanged, approximately 5 mm. He will need elective treatment of his stone, ideally within the next 3 to 6 months. I will have him follow-up with cardiology to see what would be prudent in terms of timing getting off of his antiplatelet therapy.

## 2022-01-05 NOTE — ASSESSMENT & PLAN NOTE
NSTEMI. On antiplatelet therapy. He reports some lightheadedness with standing. Potentially increased cardiac risk. I will have him follow-up with Dr. Kasey Boothe, the cardiologist that saw him at [facility].

## 2022-03-18 PROBLEM — C61 PROSTATE CANCER (HCC): Status: ACTIVE | Noted: 2022-01-03

## 2022-03-18 PROBLEM — Z96.0 RETAINED URETERAL STENT: Status: ACTIVE | Noted: 2022-01-03

## 2022-03-18 PROBLEM — I21.4 NSTEMI (NON-ST ELEVATED MYOCARDIAL INFARCTION) (HCC): Status: ACTIVE | Noted: 2022-01-03

## 2022-03-19 PROBLEM — N20.1 URETERAL STONE: Status: ACTIVE | Noted: 2022-01-03

## 2022-03-19 PROBLEM — N12 PYELONEPHRITIS: Status: ACTIVE | Noted: 2021-12-06

## 2022-03-19 PROBLEM — N17.9 AKI (ACUTE KIDNEY INJURY) (HCC): Status: ACTIVE | Noted: 2021-12-16

## 2022-03-19 PROBLEM — N20.0 KIDNEY STONE: Status: ACTIVE | Noted: 2022-01-03

## 2022-03-19 PROBLEM — N35.919 URETHRAL STRICTURE: Status: ACTIVE | Noted: 2022-01-03

## 2022-08-24 ENCOUNTER — HOSPITAL ENCOUNTER (OUTPATIENT)
Dept: PREADMISSION TESTING | Age: 62
Discharge: HOME | End: 2022-08-24
Payer: COMMERCIAL

## 2022-08-24 ENCOUNTER — TRANSCRIBE ORDER (OUTPATIENT)
Dept: REGISTRATION | Age: 62
End: 2022-08-24

## 2022-08-24 DIAGNOSIS — N20.0 KIDNEY STONE: ICD-10-CM

## 2022-08-24 DIAGNOSIS — N20.0 KIDNEY STONE: Primary | ICD-10-CM

## 2022-08-24 LAB
ANION GAP SERPL CALC-SCNC: 3 MMOL/L (ref 3–18)
BUN SERPL-MCNC: 22 MG/DL (ref 7–18)
BUN/CREAT SERPL: 14 (ref 12–20)
CALCIUM SERPL-MCNC: 9.6 MG/DL (ref 8.5–10.1)
CHLORIDE SERPL-SCNC: 104 MMOL/L (ref 100–111)
CO2 SERPL-SCNC: 32 MMOL/L (ref 21–32)
CREAT SERPL-MCNC: 1.57 MG/DL (ref 0.6–1.3)
ERYTHROCYTE [DISTWIDTH] IN BLOOD BY AUTOMATED COUNT: 11.5 % (ref 11.6–14.5)
GLUCOSE SERPL-MCNC: 123 MG/DL (ref 74–99)
HCT VFR BLD AUTO: 38.2 % (ref 36–48)
HGB BLD-MCNC: 12.5 G/DL (ref 13–16)
MCH RBC QN AUTO: 29.6 PG (ref 24–34)
MCHC RBC AUTO-ENTMCNC: 32.7 G/DL (ref 31–37)
MCV RBC AUTO: 90.5 FL (ref 78–100)
NRBC # BLD: 0 K/UL (ref 0–0.01)
NRBC BLD-RTO: 0 PER 100 WBC
PLATELET # BLD AUTO: 268 K/UL (ref 135–420)
PMV BLD AUTO: 9.7 FL (ref 9.2–11.8)
POTASSIUM SERPL-SCNC: 4.7 MMOL/L (ref 3.5–5.5)
RBC # BLD AUTO: 4.22 M/UL (ref 4.35–5.65)
SODIUM SERPL-SCNC: 139 MMOL/L (ref 136–145)
WBC # BLD AUTO: 6.9 K/UL (ref 4.6–13.2)

## 2022-08-24 PROCEDURE — 85027 COMPLETE CBC AUTOMATED: CPT

## 2022-08-24 PROCEDURE — 80048 BASIC METABOLIC PNL TOTAL CA: CPT

## 2022-08-24 PROCEDURE — 93005 ELECTROCARDIOGRAM TRACING: CPT

## 2022-08-24 PROCEDURE — 36415 COLL VENOUS BLD VENIPUNCTURE: CPT

## 2022-08-25 ENCOUNTER — HOSPITAL ENCOUNTER (OUTPATIENT)
Dept: PREADMISSION TESTING | Age: 62
Discharge: HOME | End: 2022-08-25

## 2022-08-25 VITALS — WEIGHT: 249 LBS | HEIGHT: 78 IN | BODY MASS INDEX: 28.81 KG/M2

## 2022-08-25 LAB
ATRIAL RATE: 65 BPM
CALCULATED P AXIS, ECG09: 54 DEGREES
CALCULATED R AXIS, ECG10: -22 DEGREES
CALCULATED T AXIS, ECG11: 38 DEGREES
DIAGNOSIS, 93000: NORMAL
P-R INTERVAL, ECG05: 164 MS
Q-T INTERVAL, ECG07: 382 MS
QRS DURATION, ECG06: 96 MS
QTC CALCULATION (BEZET), ECG08: 397 MS
VENTRICULAR RATE, ECG03: 65 BPM

## 2022-08-25 RX ORDER — SODIUM CHLORIDE, SODIUM LACTATE, POTASSIUM CHLORIDE, CALCIUM CHLORIDE 600; 310; 30; 20 MG/100ML; MG/100ML; MG/100ML; MG/100ML
125 INJECTION, SOLUTION INTRAVENOUS CONTINUOUS
Status: CANCELLED | OUTPATIENT
Start: 2022-09-06

## 2022-08-25 RX ORDER — LEVOFLOXACIN 5 MG/ML
500 INJECTION, SOLUTION INTRAVENOUS
Status: CANCELLED | OUTPATIENT
Start: 2022-09-06 | End: 2022-09-07

## 2022-08-25 RX ORDER — ALLOPURINOL 300 MG/1
300 TABLET ORAL
COMMUNITY

## 2022-08-25 NOTE — PERIOP NOTES
PAT - SURGICAL PRE-ADMISSION INSTRUCTIONS    NAME:  Miri Emery                                                          TODAY'S DATE:  8/25/2022    SURGERY DATE:  9/6/2022                                  SURGERY ARRIVAL TIME:   TBD    Do NOT eat or drink anything, including candy or gum, after MIDNIGHT on 9/6/2022 , unless you have specific instructions from your Surgeon or Anesthesia Provider to do so. No smoking 24 hours before surgery. No alcohol 24 hours prior to the day of surgery. No recreational drugs for one week prior to the day of surgery. Leave all valuables, including money/purse, at home. Remove all jewelry, nail polish, makeup (including mascara); no lotions, powders, deodorant, or perfume/cologne/after shave. Glasses/Contact lenses and Dentures may be worn to the hospital.  They will be removed prior to surgery. Call your doctor if symptoms of a cold or illness develop within 24 ours prior to surgery. AN ADULT MUST DRIVE YOU HOME AFTER OUTPATIENT SURGERY. If you are having an OUTPATIENT procedure, please make arrangements for a responsible adult to be with you for 24 hours after your surgery. If you are admitted to the hospital, you will be assigned to a bed after surgery is complete. Normally a family member will not be able to see you until you are in your assigned bed. 12. Visitation Restrictions Explained. Pt is not spec pop  Pt has an appt with cardiologist on 8/30. Special Instructions:  Pt is vaccinated, record not on file. Pt to follow up with cardiologist on 8/30 for further instructions on plavix and aspirin  Avoid nsaids 7 days before procedure. Pt takes all his meds at night. Patient Prep:    use CHG solution, pt received. Instructions reviewed. These surgical instructions were reviewed with patient during the PAT phone interview.

## 2022-08-31 NOTE — PERIOP NOTES
Spoke to MARIETTA at Dr. Stephanie Swain office. She sent request for clearances several times and will follow-up.

## 2022-08-31 NOTE — PERIOP NOTES
Request faxed to Dr. Valdene Oppenheim office for cardiac clearance and anticoag instructions. Pt states he had appt with cardiology on 8-30-22.  LVM sanford Drummond

## 2022-09-06 ENCOUNTER — HOSPITAL ENCOUNTER (OUTPATIENT)
Age: 62
Setting detail: OUTPATIENT SURGERY
Discharge: HOME | End: 2022-09-06
Attending: UROLOGY | Admitting: UROLOGY
Payer: COMMERCIAL

## 2022-09-06 ENCOUNTER — APPOINTMENT (OUTPATIENT)
Dept: GENERAL RADIOLOGY | Age: 62
End: 2022-09-06
Attending: UROLOGY
Payer: COMMERCIAL

## 2022-09-06 ENCOUNTER — ANESTHESIA EVENT (OUTPATIENT)
Dept: SURGERY | Age: 62
End: 2022-09-06
Payer: COMMERCIAL

## 2022-09-06 ENCOUNTER — ANESTHESIA (OUTPATIENT)
Dept: SURGERY | Age: 62
End: 2022-09-06
Payer: COMMERCIAL

## 2022-09-06 VITALS
RESPIRATION RATE: 16 BRPM | HEART RATE: 54 BPM | DIASTOLIC BLOOD PRESSURE: 68 MMHG | OXYGEN SATURATION: 100 % | TEMPERATURE: 97.8 F | HEIGHT: 78 IN | WEIGHT: 251.2 LBS | BODY MASS INDEX: 29.06 KG/M2 | SYSTOLIC BLOOD PRESSURE: 131 MMHG

## 2022-09-06 DIAGNOSIS — I21.4 NSTEMI (NON-ST ELEVATED MYOCARDIAL INFARCTION) (HCC): Primary | ICD-10-CM

## 2022-09-06 LAB — GLUCOSE BLD STRIP.AUTO-MCNC: 158 MG/DL (ref 70–110)

## 2022-09-06 PROCEDURE — 74011250636 HC RX REV CODE- 250/636: Performed by: UROLOGY

## 2022-09-06 PROCEDURE — 77030020782 HC GWN BAIR PAWS FLX 3M -B: Performed by: UROLOGY

## 2022-09-06 PROCEDURE — C1758 CATHETER, URETERAL: HCPCS | Performed by: UROLOGY

## 2022-09-06 PROCEDURE — 77010033678 HC OXYGEN DAILY

## 2022-09-06 PROCEDURE — 82962 GLUCOSE BLOOD TEST: CPT

## 2022-09-06 PROCEDURE — 77030040361 HC SLV COMPR DVT MDII -B: Performed by: UROLOGY

## 2022-09-06 PROCEDURE — 77030038846 HC SCPE URETSCP LITHOVUE DISP BSC -H: Performed by: UROLOGY

## 2022-09-06 PROCEDURE — 77030010103 HC SEAL PRT ENDOSC OCOA -B: Performed by: UROLOGY

## 2022-09-06 PROCEDURE — 82360 CALCULUS ASSAY QUANT: CPT

## 2022-09-06 PROCEDURE — 77030006974 HC BSKT URET RTVR BSC -C: Performed by: UROLOGY

## 2022-09-06 PROCEDURE — 74011636637 HC RX REV CODE- 636/637: Performed by: ANESTHESIOLOGY

## 2022-09-06 PROCEDURE — 2709999900 HC NON-CHARGEABLE SUPPLY: Performed by: UROLOGY

## 2022-09-06 PROCEDURE — 74011000250 HC RX REV CODE- 250: Performed by: NURSE ANESTHETIST, CERTIFIED REGISTERED

## 2022-09-06 PROCEDURE — 74011000636 HC RX REV CODE- 636: Performed by: UROLOGY

## 2022-09-06 PROCEDURE — C2617 STENT, NON-COR, TEM W/O DEL: HCPCS | Performed by: UROLOGY

## 2022-09-06 PROCEDURE — 76210000026 HC REC RM PH II 1 TO 1.5 HR: Performed by: UROLOGY

## 2022-09-06 PROCEDURE — 74011250636 HC RX REV CODE- 250/636: Performed by: NURSE ANESTHETIST, CERTIFIED REGISTERED

## 2022-09-06 PROCEDURE — 74011250637 HC RX REV CODE- 250/637: Performed by: ANESTHESIOLOGY

## 2022-09-06 PROCEDURE — 77030014023 HC SYR INFL LVEEN BSC -B: Performed by: UROLOGY

## 2022-09-06 PROCEDURE — 76060000033 HC ANESTHESIA 1 TO 1.5 HR: Performed by: UROLOGY

## 2022-09-06 PROCEDURE — C1769 GUIDE WIRE: HCPCS | Performed by: UROLOGY

## 2022-09-06 PROCEDURE — 76210000063 HC OR PH I REC FIRST 0.5 HR: Performed by: UROLOGY

## 2022-09-06 PROCEDURE — 76010000149 HC OR TIME 1 TO 1.5 HR: Performed by: UROLOGY

## 2022-09-06 PROCEDURE — C1726 CATH, BAL DIL, NON-VASCULAR: HCPCS | Performed by: UROLOGY

## 2022-09-06 PROCEDURE — 77030012508 HC MSK AIRWY LMA AMBU -A: Performed by: NURSE ANESTHETIST, CERTIFIED REGISTERED

## 2022-09-06 DEVICE — VARIABLE LENGTH URETERAL STENT
Type: IMPLANTABLE DEVICE | Site: URETER | Status: FUNCTIONAL
Brand: CONTOUR VL™

## 2022-09-06 RX ORDER — SODIUM CHLORIDE, SODIUM LACTATE, POTASSIUM CHLORIDE, CALCIUM CHLORIDE 600; 310; 30; 20 MG/100ML; MG/100ML; MG/100ML; MG/100ML
75 INJECTION, SOLUTION INTRAVENOUS CONTINUOUS
Status: DISCONTINUED | OUTPATIENT
Start: 2022-09-06 | End: 2022-09-06 | Stop reason: HOSPADM

## 2022-09-06 RX ORDER — HYDROCODONE BITARTRATE AND ACETAMINOPHEN 5; 325 MG/1; MG/1
1 TABLET ORAL
Qty: 20 TABLET | Refills: 0 | Status: SHIPPED | OUTPATIENT
Start: 2022-09-06 | End: 2022-09-11

## 2022-09-06 RX ORDER — LEVOFLOXACIN 500 MG/1
500 TABLET, FILM COATED ORAL DAILY
Qty: 4 TABLET | Refills: 0 | Status: SHIPPED | OUTPATIENT
Start: 2022-09-07 | End: 2022-09-11

## 2022-09-06 RX ORDER — FENTANYL CITRATE 50 UG/ML
INJECTION, SOLUTION INTRAMUSCULAR; INTRAVENOUS AS NEEDED
Status: DISCONTINUED | OUTPATIENT
Start: 2022-09-06 | End: 2022-09-06 | Stop reason: HOSPADM

## 2022-09-06 RX ORDER — HYDROMORPHONE HYDROCHLORIDE 1 MG/ML
0.5 INJECTION, SOLUTION INTRAMUSCULAR; INTRAVENOUS; SUBCUTANEOUS
Status: DISCONTINUED | OUTPATIENT
Start: 2022-09-06 | End: 2022-09-06 | Stop reason: HOSPADM

## 2022-09-06 RX ORDER — SODIUM CHLORIDE 0.9 % (FLUSH) 0.9 %
5-40 SYRINGE (ML) INJECTION AS NEEDED
Status: DISCONTINUED | OUTPATIENT
Start: 2022-09-06 | End: 2022-09-06 | Stop reason: HOSPADM

## 2022-09-06 RX ORDER — SODIUM CHLORIDE, SODIUM LACTATE, POTASSIUM CHLORIDE, CALCIUM CHLORIDE 600; 310; 30; 20 MG/100ML; MG/100ML; MG/100ML; MG/100ML
125 INJECTION, SOLUTION INTRAVENOUS CONTINUOUS
Status: DISCONTINUED | OUTPATIENT
Start: 2022-09-06 | End: 2022-09-06 | Stop reason: HOSPADM

## 2022-09-06 RX ORDER — ONDANSETRON 2 MG/ML
INJECTION INTRAMUSCULAR; INTRAVENOUS AS NEEDED
Status: DISCONTINUED | OUTPATIENT
Start: 2022-09-06 | End: 2022-09-06 | Stop reason: HOSPADM

## 2022-09-06 RX ORDER — LEVOFLOXACIN 5 MG/ML
500 INJECTION, SOLUTION INTRAVENOUS
Status: COMPLETED | OUTPATIENT
Start: 2022-09-06 | End: 2022-09-06

## 2022-09-06 RX ORDER — INSULIN LISPRO 100 [IU]/ML
INJECTION, SOLUTION INTRAVENOUS; SUBCUTANEOUS ONCE
Status: COMPLETED | OUTPATIENT
Start: 2022-09-06 | End: 2022-09-06

## 2022-09-06 RX ORDER — MIDAZOLAM HYDROCHLORIDE 1 MG/ML
INJECTION, SOLUTION INTRAMUSCULAR; INTRAVENOUS AS NEEDED
Status: DISCONTINUED | OUTPATIENT
Start: 2022-09-06 | End: 2022-09-06 | Stop reason: HOSPADM

## 2022-09-06 RX ORDER — ALBUTEROL SULFATE 0.83 MG/ML
2.5 SOLUTION RESPIRATORY (INHALATION) AS NEEDED
Status: DISCONTINUED | OUTPATIENT
Start: 2022-09-06 | End: 2022-09-06 | Stop reason: HOSPADM

## 2022-09-06 RX ORDER — LIDOCAINE HYDROCHLORIDE 20 MG/ML
INJECTION, SOLUTION EPIDURAL; INFILTRATION; INTRACAUDAL; PERINEURAL AS NEEDED
Status: DISCONTINUED | OUTPATIENT
Start: 2022-09-06 | End: 2022-09-06 | Stop reason: HOSPADM

## 2022-09-06 RX ORDER — FENTANYL CITRATE 50 UG/ML
25 INJECTION, SOLUTION INTRAMUSCULAR; INTRAVENOUS AS NEEDED
Status: DISCONTINUED | OUTPATIENT
Start: 2022-09-06 | End: 2022-09-06 | Stop reason: HOSPADM

## 2022-09-06 RX ORDER — MAGNESIUM SULFATE 100 %
4 CRYSTALS MISCELLANEOUS AS NEEDED
Status: DISCONTINUED | OUTPATIENT
Start: 2022-09-06 | End: 2022-09-06 | Stop reason: HOSPADM

## 2022-09-06 RX ORDER — EPHEDRINE SULFATE/0.9% NACL/PF 50 MG/5 ML
SYRINGE (ML) INTRAVENOUS AS NEEDED
Status: DISCONTINUED | OUTPATIENT
Start: 2022-09-06 | End: 2022-09-06 | Stop reason: HOSPADM

## 2022-09-06 RX ORDER — HYDROCODONE BITARTRATE AND ACETAMINOPHEN 5; 325 MG/1; MG/1
1 TABLET ORAL
Status: COMPLETED | OUTPATIENT
Start: 2022-09-06 | End: 2022-09-06

## 2022-09-06 RX ORDER — PROPOFOL 10 MG/ML
INJECTION, EMULSION INTRAVENOUS AS NEEDED
Status: DISCONTINUED | OUTPATIENT
Start: 2022-09-06 | End: 2022-09-06 | Stop reason: HOSPADM

## 2022-09-06 RX ORDER — SODIUM CHLORIDE 0.9 % (FLUSH) 0.9 %
5-40 SYRINGE (ML) INJECTION EVERY 8 HOURS
Status: DISCONTINUED | OUTPATIENT
Start: 2022-09-06 | End: 2022-09-06 | Stop reason: HOSPADM

## 2022-09-06 RX ADMIN — Medication 5 MG: at 09:10

## 2022-09-06 RX ADMIN — FENTANYL CITRATE 25 MCG: 50 INJECTION, SOLUTION INTRAMUSCULAR; INTRAVENOUS at 09:33

## 2022-09-06 RX ADMIN — PROPOFOL 200 MG: 10 INJECTION, EMULSION INTRAVENOUS at 09:05

## 2022-09-06 RX ADMIN — ONDANSETRON HYDROCHLORIDE 4 MG: 2 INJECTION INTRAMUSCULAR; INTRAVENOUS at 09:25

## 2022-09-06 RX ADMIN — FENTANYL CITRATE 25 MCG: 50 INJECTION, SOLUTION INTRAMUSCULAR; INTRAVENOUS at 09:18

## 2022-09-06 RX ADMIN — INSULIN LISPRO 3 UNITS: 100 INJECTION, SOLUTION INTRAVENOUS; SUBCUTANEOUS at 10:12

## 2022-09-06 RX ADMIN — MIDAZOLAM 2 MG: 1 INJECTION INTRAMUSCULAR; INTRAVENOUS at 08:58

## 2022-09-06 RX ADMIN — LIDOCAINE HYDROCHLORIDE 80 MG: 20 INJECTION, SOLUTION INTRAVENOUS at 09:05

## 2022-09-06 RX ADMIN — SODIUM CHLORIDE, SODIUM LACTATE, POTASSIUM CHLORIDE, AND CALCIUM CHLORIDE 125 ML/HR: 600; 310; 30; 20 INJECTION, SOLUTION INTRAVENOUS at 10:08

## 2022-09-06 RX ADMIN — Medication 5 MG: at 09:20

## 2022-09-06 RX ADMIN — Medication 5 MG: at 09:28

## 2022-09-06 RX ADMIN — FENTANYL CITRATE 25 MCG: 50 INJECTION, SOLUTION INTRAMUSCULAR; INTRAVENOUS at 09:05

## 2022-09-06 RX ADMIN — HYDROCODONE BITARTRATE AND ACETAMINOPHEN 1 TABLET: 5; 325 TABLET ORAL at 11:17

## 2022-09-06 RX ADMIN — SODIUM CHLORIDE, SODIUM LACTATE, POTASSIUM CHLORIDE, AND CALCIUM CHLORIDE 125 ML/HR: 600; 310; 30; 20 INJECTION, SOLUTION INTRAVENOUS at 07:53

## 2022-09-06 RX ADMIN — FENTANYL CITRATE 25 MCG: 50 INJECTION, SOLUTION INTRAMUSCULAR; INTRAVENOUS at 09:10

## 2022-09-06 RX ADMIN — LEVOFLOXACIN 500 MG: 5 INJECTION, SOLUTION INTRAVENOUS at 09:10

## 2022-09-06 RX ADMIN — Medication 5 MG: at 09:15

## 2022-09-06 NOTE — BRIEF OP NOTE
Brief Postoperative Note    Patient: Lino Kelly  YOB: 1960  MRN: [id number]    Date of Procedure: 9/6/2022     Pre-Op Diagnosis: LEFT URETERAL STONE, BILATERAL RENAL STONES    Post-Op Diagnosis:  BILATERAL RENAL STONES       Procedure(s):  CYSTOSCOPY BILATERAL RETROGRADE PYELOGRAM WITH INTERPRETATION, LEFT URETEROSCOPY WITH LASER LITHOTRIPSY WITH HOLMIUM, STENT CHANGE ON LEFT, RIGHT DIAGNOSTIC URETEROSCOPY WITH STENT PLACEMENT  Surgeon(s):  Callie Youssef MD    Surgical Assistant: NONE    Anesthesia: General     Estimated Blood Loss (mL): Minimal    Complications: None    Specimens:   ID Type Source Tests Collected by Time Destination   1 : Renal stones Fresh Kidney  Callie Youssef MD 9/6/2022 0930 Pathology        Implants:   Implant Name Type Inv. Item Serial No.  Lot No. LRB No. Used Action   STENT URET 4.8FR [id number] PERCFLX HYDR+ SFT PGTL TAPR TIP - [id number]  STENT URET 4.8FR [id number] PERCFLX HYDR+ SFT PGTL TAPR TIP  [employer]_ [id number] Left 1 Implanted   STENT URET CONTOUR [id number] -- [facility] - [id number]  STENT URET CONTOUR [id number] -- PERCUFLEX  [employer]_WD [id number] Right 1 Implanted       Drains: * No LDAs found *    Findings: STONE IN LEFT KIDNEY RENAL PELVIS, NO URETERAL STONES. IT WAS VERY DENSE. RIGHT URETER WAS TIGHT.   THE DISTAL 1/3 WAS BALLOON DILATED, BUT THE PROXIMAL 1/3 WAS STILL TOO TIGHT TO PASS THE URETEROSCOPE    Electronically Signed by Arleth Garza MD on 9/6/2022 at 10:11 AM

## 2022-09-06 NOTE — ANESTHESIA PREPROCEDURE EVALUATION
Relevant Problems   CARDIOVASCULAR   (+) NSTEMI (non-ST elevated myocardial infarction) ([facility] [address]      RENAL FAILURE   (+) FERMIN (acute kidney injury) ([facility] [address]   (+) Kidney stone      PERSONAL HX & FAMILY HX OF CANCER   (+) [facility]       Anesthetic History               Review of Systems / Medical History      Pulmonary                   Neuro/Psych              Cardiovascular    Hypertension: well controlled          CAD    Exercise tolerance: >4 METS  Comments: S/P NSTEMI 12/21/21, S/P LHC, no Stents, 30% lesion mid LAD, 100% stenosis 2nd obtuse (Circ). Been on plavix, stopped 5 days ago.   Mod-high risk per cardiologist.    GI/Hepatic/Renal                Endo/Other    Diabetes: well controlled, type 2  Hypothyroidism       Other Findings              Physical Exam    Airway  Mallampati: II  TM Distance: > 6 cm  Neck ROM: normal range of motion   Mouth opening: Normal     Cardiovascular    Rhythm: regular  Rate: normal         Dental         Pulmonary  Breath sounds clear to auscultation               Abdominal  GI exam deferred       Other Findings            Anesthetic Plan    ASA: 3  Anesthesia type: general          Induction: Intravenous  Anesthetic plan and risks discussed with: Patient

## 2022-09-06 NOTE — PERIOP NOTES
Reviewed PTA medication list with patient/caregiver and patient/caregiver denies any additional medications. Patient admits to having a responsible adult care for them at home for at least 24 hours after surgery. Patient encouraged to use gown warming system and informed that using said warming gown to regulate body temperature prior to a procedure has been shown to help reduce the risks of blood clots and infection. Patient's pharmacy of choice verified and documented in PTA medication section. Dual skin assessment & fall risk band verification completed with Aziza Chávez RN.

## 2022-09-06 NOTE — H&P
Urology [address] [address]  Tel: [phone]  Fax: [phone]    Patient : Nieves Taylor   YOB: 1960                   Assessment/Plan  # Detail Type Description    1. Assessment Hydronephrosis with ureteral calculous obstruction (N13.2). Patient Plan He has a left ureteral stone and he had a stent placed urgently. This stent at this point is retained. He's also had a cardiac catheter and is being managed with Plavix. He's not a good candidate for ESWL. We discussed the risks/benefits of URS. He will proceed with a cysto, left URS with Holmium laser litho and stent changed. He's also has krish renal stones and if everything goes according to plan I will also do a right URS with Holmium laser litho and stent change on the right side. Risk of bleeding, infection, injury and possible need for more than one procedure to remain stone free were discussed. 2. Assessment Calculus of kidney (N20.0). Patient Plan He has krish large kidney stones. He has krish retained stents. I will tx his kidney stones at the same time of his ureteral stone. We discussed there's the risk of possibly needing more than one procedure to remain stone free. He will need cardiac clearance. Plan Orders The patient had the following order(s) completed today: UA In Office No Micro. Obtained on 07/19/2022, Interpretation: See module. 3. Other Orders Orders not associated to today's assessments. Plan Orders Urine Culture, Routine to be performed. Obtained on 07/19/2022. Additional Visit Information      This 57year old male presents for Kidney and/or Ureteral Stone. History of Present Illness  1. Kidney and/or Ureteral Stone   Onset was 7 months ago. Severity level is moderate. The problem is with no change. Location of pain is bilateral flank. The pain does not radiate. Associated symptoms include hematuria (gross), nausea and pain. Pertinent negatives include chills, constipation, diarrhea, fever and vomiting. Additional information: He had bilateral stents palced for ureteral stones 1/2022 in [place] but then had a possible MI and had a cardiac cath afterward. He is doing fine and no dysuria but intermittent hematuria. 5mm left ureteral stone and 8mm raneal sotne and 7mm right renal stone on CT (12/2021 personally reviewed).           Past Medical/Surgical History   (Reviewed, updated)  Disease/disorder Onset Date Management Date Comments   Diabetes, type 2 2012      Cancer, Prostate  Prostate Surgery 12/2009      Hernia repair 2008    Blepharitis       Hyperlipidemia       Gout              Thyroid disease       Diabetes mellitus type2, no retinopathy       Dermatochalasis       brow ptosis       NSTEMI (12/20/2021)           Problem List  Problem Description Onset Date Chronic Clinical Status Notes   Urinary frequency 05/13/2015 N     Erectile dysfunction 05/13/2015 N     Type II diabetes mellitus w/o complication 76/70/6150 Y     Gout 01/11/2012 Y     Disorder of refraction AND/OR accommodation 04/15/2014 Y     Hypothyroidism 09/13/2010 Y     Screening for malignant neoplasm of colon done 02/08/2022 N     History of malignant neoplasm of prostate 02/08/2022 N       Medications (active prior to today)  Medication Instructions Start Date Stop Date Refilled Elsewhere   multivitamin tablet take 1 tablet by oral route  every day with food 02/23/2021   N   aspirin 81 mg tablet,delayed release take 1 tablet by oral route  every day 02/08/2022   N   atorvastatin 80 mg tablet take 1 tablet by oral route  every day 02/08/2022   N   clopidogrel 75 mg tablet take 1 tablet by oral route  every day 02/08/2022   N   hydrochlorothiazide 25 mg tablet take 1 tablet by oral route  every day 02/08/2022   N   pantoprazole 40 mg tablet,delayed release take 1 tablet by oral route  every day 02/08/2022   N   tamsulosin 0.4 mg capsule take 1 capsule by oral route  every day 1/2 hour following the same meal each day 2022   N   allopurinol 300 mg tablet take 1 tablet by oral route  every day 2022 N   levothyroxine 200 mcg capsule take 1 capsule by oral route  every day 2022 N       Medication Reconciliation  Medications reconciled today. Allergies  Ingredient Reaction (Severity) Medication Name Comment   NO KNOWN ALLERGIES        Reviewed, no changes. Family History   (Reviewed, updated)    Relationship Family Member Name  Age at Death Condition Onset Age Cause of Death   Brother  N  Alive and well  N   Father    Myocardial infarction  N   Father  N  Alive and well  N   Mother  N  Alive and well  N   Mother    Cancer, lung  N   Sister  N  Alive and well  N     Social History  (Reviewed, updated)  Tobacco use reviewed. Preferred language is [place]. Marital Status/Family/Social Support  Marital status:      Tobacco use status: Current non-smoker. Smoking status: Never smoker. Tobacco Screening  Patient has never used tobacco. Patient has not used tobacco in the last 30 days. Patient has not used smokeless tobacco in the last 30 days. Smoking Status  Type Smoking Status Usage Per Day Years Used Pack Years Total Pack Years    Never smoker         Tobacco/Vaping Exposure  No passive smoke exposure. Alcohol  There is no history of alcohol use. Caffeine  The patient uses caffeine: soda - 32 oz a day. Lifestyle  Never exercises. Review of Systems  System Neg/Pos Details   Constitutional Positive Pain. Constitutional Negative Chills and Fever. ENMT Negative Ear infections and Sore throat. Eyes Negative Blurred vision, Double vision and Eye pain. Respiratory Negative Asthma, Chronic cough, Dyspnea and Wheezing. Cardio Negative Chest pain. GI Positive Nausea. GI Negative Constipation, Decreased appetite, Diarrhea and Vomiting.  Positive Hematuria.    Endocrine Negative Cold intolerance, Heat intolerance, Increased thirst and Weight loss. Neuro Negative Headache and Tremors. Psych Negative Anxiety and Depression. Integumentary Negative Itching skin and Rash. MS Negative Back pain and Joint pain. Hema/Lymph Negative Easy bleeding. Reproductive Negative Sexual dysfunction. Vital Signs   Height  Time ft in cm Last Measured Height Position   8:56 AM 6.0 7.75 202.56 07/12/2022 Standing     Weight/BSA/BMI  Time lb oz kg Context BMI kg/m2 BSA m2   8:56 .00  112.945  27.53      Measured by  Time Measured by   8:56 AM [place]     Physical Exam  Exam Findings Details   Constitutional Normal Well developed. Neck Exam Normal Inspection - Normal.   Respiratory Normal Inspection - Normal.   Abdomen Normal No abdominal tenderness. Genitourinary Normal No CVA tenderness. No suprapubic tenderness. Extremity Normal No edema. Psychiatric Normal Orientation - Oriented to time, place, person & situation. Appropriate mood and affect.      Immunizations Entered by History  Date Immunization   6/12/2021 12:00:00 AM SARS-COV-2 (COVID-19) vaccine, mRNA, spike protein, LNP, preservative free, 100 mcg/0.5mL dose   5/10/2021 12:00:00 AM SARS-COV-2 (COVID-19) vaccine, mRNA, spike protein, LNP, preservative free, 100 mcg/0.5mL dose   8/21/2020 12:00:00 AM influenza, intradermal, quadrivalent, preservative free, injectable   10/18/2017 12:00:00 AM influenza, injectable, quadrivalent, contains preservative   10/12/2015 12:00:00 AM Influenza, seasonal, injectable   Medications (added, continued, or stopped today)  Start Date Medication Directions PRN Status PRN Reason Instruction Stop Date   02/23/2022 allopurinol 300 mg tablet take 1 tablet by oral route  every day N      02/08/2022 aspirin 81 mg tablet,delayed release take 1 tablet by oral route  every day N      02/08/2022 atorvastatin 80 mg tablet take 1 tablet by oral route  every day N      02/08/2022 clopidogrel 75 mg tablet take 1 tablet by oral route  every day N      02/08/2022 hydrochlorothiazide 25 mg tablet take 1 tablet by oral route  every day N      04/12/2022 levothyroxine 200 mcg capsule take 1 capsule by oral route  every day N      02/23/2021 multivitamin tablet take 1 tablet by oral route  every day with food N      02/08/2022 pantoprazole 40 mg tablet,delayed release take 1 tablet by oral route  every day N      02/08/2022 tamsulosin 0.4 mg capsule take 1 capsule by oral route  every day 1/2 hour following the same meal each day N          Active Patient Care Team Members  Name Contact Agency Type Support Role Relationship Active Date Inactive Date Specialty   Ulysses Posey   primary care provider    Family Pract   Desi Camarillo   encounter provider    Urology   Rohit   encounter provider       Colby Warner   Patient provider PCP   [address]   encounter provider    Gastroenterology       Provider:        Kris Mustafa MD

## 2022-09-06 NOTE — DISCHARGE INSTRUCTIONS
Resume diet as tolerated  Drink plenty of fluid to keep urine clear. The office will call for follow up appointment  Call surgeon if you are unable to void or have signs of infection. DISCHARGE SUMMARY from Nurse    PATIENT INSTRUCTIONS:    After general anesthesia or intravenous sedation, for 24 hours or while taking prescription Narcotics:  Limit your activities  Do not drive and operate hazardous machinery  Do not make important personal or business decisions  Do  not drink alcoholic beverages  If you have not urinated within 8 hours after discharge, please contact your surgeon on call. Report the following to your surgeon:  Excessive pain, swelling, redness or odor of or around the surgical area  Temperature over 100.5  Nausea and vomiting lasting longer than 4 hours or if unable to take medications  Any signs of decreased circulation or nerve impairment to extremity: change in color, persistent  numbness, tingling, coldness or increase pain  Any questions    What to do at Home:  Recommended activity: Activity as tolerated and no driving for today,     If you experience any of the following symptoms , fever, chills foul smelling urine please follow up with your surgeon. *  Please give a list of your current medications to your Primary Care Provider. *  Please update this list whenever your medications are discontinued, doses are      changed, or new medications (including over-the-counter products) are added. *  Please carry medication information at all times in case of emergency situations. These are general instructions for a healthy lifestyle:    No smoking/ No tobacco products/ Avoid exposure to second hand smoke  Surgeon General's Warning:  Quitting smoking now greatly reduces serious risk to your health.     Obesity, smoking, and sedentary lifestyle greatly increases your risk for illness    A healthy diet, regular physical exercise & weight monitoring are important for maintaining a healthy lifestyle    You may be retaining fluid if you have a history of heart failure or if you experience any of the following symptoms:  Weight gain of 3 pounds or more overnight or 5 pounds in a week, increased swelling in our hands or feet or shortness of breath while lying flat in bed. Please call your doctor as soon as you notice any of these symptoms; do not wait until your next office visit. Patient armband removed and shredded       The discharge information has been reviewed with the patient and caregiver. The patient and caregiver verbalized understanding. Discharge medications reviewed with the patient and caregiver and appropriate educational materials and side effects teaching were provided.   ___________________________________________________________________________________________________________________________________

## 2022-09-06 NOTE — PERIOP NOTES
TRANSFER - IN REPORT:    Verbal report received from OR RN(name) on [facility]  being received from OR(unit) for routine progression of care      Report consisted of patients Situation, Background, Assessment and   Recommendations(SBAR). Information from the following report(s) OR Summary, Procedure Summary, and MAR was reviewed with the receiving nurse. Opportunity for questions and clarification was provided. Assessment completed upon patients arrival to unit and care assumed.

## 2022-09-06 NOTE — PERIOP NOTES
TRANSFER - OUT REPORT:    Verbal report given to [facility] RN(name) on [facility]  being transferred to Phase2(unit) for routine progression of care       Report consisted of patients Situation, Background, Assessment and   Recommendations(SBAR). Information from the following report(s) OR Summary, Procedure Summary, and MAR was reviewed with the receiving nurse. Lines:   Peripheral IV 09/06/22 Anterior; Left Forearm (Active)   Site Assessment Clean, dry, & intact 09/06/22 1020   Phlebitis Assessment 0 09/06/22 1020   Infiltration Assessment 0 09/06/22 1020   Dressing Status Clean, dry, & intact 09/06/22 1020   Dressing Type Transparent;Tape 09/06/22 1020   Hub Color/Line Status Pink;Patent; Infusing 09/06/22 1020        Opportunity for questions and clarification was provided.       Patient transported with:   Registered Nurse

## 2022-09-06 NOTE — ANESTHESIA POSTPROCEDURE EVALUATION
Procedure(s):  CYSTOSCOPY BILATERAL RETROGRADE PYELOGRAM BILATERAL URETEROSCOPY, LASER LITHOTRIPSY WITH HOLMIUM, STENT CHANGE ON LEFT, STENT PLACED ON RIGHT. general    Anesthesia Post Evaluation        Patient location during evaluation: PACU  Patient participation: complete - patient participated  Level of consciousness: awake and alert  Pain score: 0  Pain management: adequate  Airway patency: patent  Anesthetic complications: no  Cardiovascular status: acceptable  Respiratory status: acceptable  Hydration status: acceptable  Post anesthesia nausea and vomiting:  none  Final Post Anesthesia Temperature Assessment:  Normothermia (36.0-37.5 degrees C)      INITIAL Post-op Vital signs:   Vitals Value Taken Time   /75 09/06/22 1025   Temp 36.4 °C (97.5 °F) 09/06/22 1018   Pulse 70 09/06/22 1028   Resp 16 09/06/22 1028   SpO2 94 % 09/06/22 1028   Vitals shown include unvalidated device data.

## 2022-09-06 NOTE — OP NOTES
[address] REPORT    Name:  Jasmin Lambert  MR#:   [id number]  :  1960  ACCOUNT #:  [de-identified]  DATE OF SERVICE:  2022    PREOPERATIVE DIAGNOSIS:  Left ureteral stone and bilateral renal stones. POSTOPERATIVE DIAGNOSIS:  Bilateral renal stones. PROCEDURES PERFORMED:  Cystoscopy, bilateral retrograde pyelogram with interpretation, left ureteroscopy with holmium laser lithotripsy and stent change, and right diagnostic ureteroscopy with stent placement. SURGEON:  Vickie Lewis MD    ASSISTANT:  None. ANESTHESIA:  General.    COMPLICATIONS:  None. SPECIMENS REMOVED:  Renal stones. IMPLANTS:  A right 6-Nigerian variable-length stent and a left 4.8-Nigerian variable-length stent. ESTIMATED BLOOD LOSS:  Minimal.    INDICATIONS/CLINICAL NOTE:  The patient is a 70-year-old gentleman with a history of a ureteral stone and urosepsis. He had a stent placed urgently nine months ago. He now presents for completion of ureteroscopy. PROCEDURE:  Preoperatively, the risks and benefits of surgery were described to the patient. The risks included, but were not limited to bleeding, infection, injury to the bladder, injury to the ureter, injury to the kidney, and possible need for future procedure to be made stone-free. The patient understood the risks and signed the informed consent. The patient was taken to the operating room and placed on the OR table in supine position. He was administered general anesthetic. He was administered intravenous antibiotics. He was placed in dorsal lithotomy position and prepped and draped in the usual sterile manner. A 22-Nigerian cystoscope and sheath were then inserted transurethrally atraumatically under direct vision using a 30-degree lens. Panendoscopy was done. Bilateral ureteral orifices were in normal anatomic position. The urethra was tight along the penile urethra, but I was able to get my scope through.   The prostatic urethra was missing and he has a history of radical prostatectomy. There was a stent emanating from the left ureteral orifice. There were no stones, no tumors, no areas of concern. I grabbed the stent with alligator forceps and removed it. I then reinserted the cystoscope under direct vision. I then cannulated the left ureteral orifice with a 5-Persian open-ended ureteral catheter. Retrograde pyelogram was done in the usual manner using 13 mL of Visipaque dye. There were no filling defects along the entire length of the ureter. Up in the kidney, there was no filling defects. There was no hydronephrosis. No blunting of calyces. Next, I passed a sensor wire through the open-ended catheter up to the kidney. The open-ended catheter was moved. The scope was removed. I then passed a dual-lumen catheter. I passed a second sensor wire up to the kidney. The dual-lumen catheter was removed. One wire was a safety wire and the other wire was a working wire. Over the working wire, I passed an 11/13, 46-cm navigator sheath. The inner working of the sheath and the working wire were removed. I then passed a flexible ureteroscope through the sheath up to the kidney. Panpyeloscopy was done. Every calyx was entered. There was noted to be one stone in the renal pelvis that was pushed into a midpole calyx. No other stones were identified. Using a 272-micron holmium laser fiber, I broke up the stones into small fragments and extracted out the fragments with a ZeroTip basket. The stones were noted to be quite dense. I then looked in every calyx again and there were no significant stone fragments anywhere and there were no stones anywhere to be seen. I then slowly backed the scope and the sheath down the ureter. The ureter was intact and unharmed, and there were no stones along the length of the ureter. I then reinserted the cystoscope. Over the safety wire, I passed a 4.8-Persian variable-length stent.   The wire was removed. Fluoroscopy confirmed the proximal coil was within the kidney. The distal coil was noted to be in the bladder by direct vision. At this point, I then cannulated the right ureteral orifice with a 5-Ghanaian open-ended ureteral catheter. Retrograde pyelogram was done in the usual manner using 10 mL of Visipaque dye. There were no filling defects anywhere along the ureter up in the kidney. There was no blunting of calyces or hydronephrosis. Next, I passed a sensor wire through the open-ended catheter up to the kidney. The open-ended catheter was removed. The scope was removed. I then passed a dual-lumen catheter and I could barely get the dual-lumen catheter beyond the UO. I passed a second sensor wire up to the kidney. The dual-lumen catheter was removed. One wire was a safety wire and the other wire was a working wire. Over the working wire, I tried passing an 11-Ghanaian dilator and I could not get it much past the UO. I then removed that and I passed a NephroMax 10 cm 15-Ghanaian dilator and I dilated the distally ureter. This was held for one  minute and deflated. The balloon was then removed, and over the working wire, I then passed a flexible ureteroscope and I got it all the way up past where I dilated and I passed the middle third of the ureter and as I got to the proximal third of the ureter, which was just too tight again. The ureter has very blanched mucosa and is just tight. I elected at this point to place a stent for passive dilation. I then slowly backed the scope and the sheath down the ureter. The ureter was intact and unharmed. I then reinserted the cystoscope. Over the safety wire, I passed a 6-Ghanaian variable-length stent. The wire was removed. Fluoroscopy confirmed the proximal coil was within the kidney. The distal coil was noted to be in the bladder by direct vision.   At this point, the patient was taken out of the lithotomy position, revived from anesthesia, and taken to Recovery in stable condition.       Corinna Pñea MD      [id number]  D:  09/06/2022 10:19  T:  09/06/2022 13:20  JOB #:  [id number]

## 2022-09-06 NOTE — PERIOP NOTES
TRANSFER - IN REPORT:    [facility] [address] [place] [address] on [facility]  being received from  PACU (unit) for routine post - op      Report consisted of patients Situation, Background, Assessment and   Recommendations(SBAR). Information from the following report(s) SBAR was reviewed with the receiving nurse. Opportunity for questions and clarification was provided. Assessment completed upon patients arrival to unit and care assumed.

## 2022-09-06 NOTE — PERIOP NOTES
Reviewed discharge instructions with patient and caregiver understanding verbalized patient stating ready to go home.

## 2022-09-10 LAB
CALCIUM OXALATE DIHYDRATE MFR STONE IR: 10 %
COLOR STONE: NORMAL
COM MFR STONE: 90 %
COMMENT, 519994: NORMAL
COMPOSITION, 120440: NORMAL
DISCLAIMER, STO32L: NORMAL
PHOTO, 120675: NORMAL
PLEASE NOTE, 130422: NORMAL
SIZE STONE: NORMAL MM
SPECIMEN SOURCE: NORMAL
WT STONE: 31 MG

## 2022-10-18 ENCOUNTER — HOSPITAL ENCOUNTER (OUTPATIENT)
Dept: PREADMISSION TESTING | Age: 62
Discharge: HOME | End: 2022-10-18

## 2022-10-21 ENCOUNTER — HOSPITAL ENCOUNTER (OUTPATIENT)
Dept: PREADMISSION TESTING | Age: 62
Discharge: HOME | End: 2022-10-21

## 2022-10-21 VITALS — BODY MASS INDEX: 28.35 KG/M2 | HEIGHT: 78 IN | WEIGHT: 245 LBS

## 2022-10-21 RX ORDER — SODIUM CHLORIDE, SODIUM LACTATE, POTASSIUM CHLORIDE, CALCIUM CHLORIDE 600; 310; 30; 20 MG/100ML; MG/100ML; MG/100ML; MG/100ML
125 INJECTION, SOLUTION INTRAVENOUS CONTINUOUS
Status: CANCELLED | OUTPATIENT
Start: 2022-10-21

## 2022-10-21 NOTE — PERIOP NOTES
PAT - SURGICAL PRE-ADMISSION INSTRUCTIONS    NAME:  Rajan Ferrell                                                          TODAY'S DATE:  10/21/2022    SURGERY DATE:  10/25/2022                                  SURGERY ARRIVAL TIME:   TBA    Do NOT eat or drink anything, including candy or gum, after MIDNIGHT on 10/25/2022 , unless you have specific instructions from your Surgeon or Anesthesia Provider to do so. No smoking 24 hours before surgery. No alcohol 24 hours prior to the day of surgery. No recreational drugs for one week prior to the day of surgery. Leave all valuables, including money/purse, at home. Remove all jewelry, nail polish, makeup (including mascara); no lotions, powders, deodorant, or perfume/cologne/after shave. Glasses/Contact lenses and Dentures may be worn to the hospital.  They will be removed prior to surgery. Call your doctor if symptoms of a cold or illness develop within 24 ours prior to surgery. AN ADULT MUST DRIVE YOU HOME AFTER OUTPATIENT SURGERY. If you are having an OUTPATIENT procedure, please make arrangements for a responsible adult to be with you for 24 hours after your surgery. If you are admitted to the hospital, you will be assigned to a bed after surgery is complete. Normally a family member will not be able to see you until you are in your assigned bed. 12. Visitation Restrictions Explained. Special Instructions:  Covid Test not needed.  Patient vaccinated , Quarantine requirements discussed  No Advanced Directive or DNR  Take these medications the morning of surgery with a sip of water:  atorvastatin , STOP anticoagulants AT LEAST 1 WEEK PRIOR to your surgery or, follow other MD instructions:  Plavix, aspirin     Patient Prep:    use CHG solution three nights prior to procedure     These surgical instructions were reviewed with patient during the PAT phone call

## 2022-10-25 ENCOUNTER — APPOINTMENT (OUTPATIENT)
Dept: GENERAL RADIOLOGY | Age: 62
End: 2022-10-25
Attending: UROLOGY
Payer: COMMERCIAL

## 2022-10-25 ENCOUNTER — HOSPITAL ENCOUNTER (OUTPATIENT)
Age: 62
Setting detail: OUTPATIENT SURGERY
Discharge: HOME | End: 2022-10-25
Attending: UROLOGY | Admitting: UROLOGY
Payer: COMMERCIAL

## 2022-10-25 ENCOUNTER — ANESTHESIA (OUTPATIENT)
Dept: SURGERY | Age: 62
End: 2022-10-25
Payer: COMMERCIAL

## 2022-10-25 ENCOUNTER — ANESTHESIA EVENT (OUTPATIENT)
Dept: SURGERY | Age: 62
End: 2022-10-25
Payer: COMMERCIAL

## 2022-10-25 VITALS
RESPIRATION RATE: 18 BRPM | SYSTOLIC BLOOD PRESSURE: 151 MMHG | TEMPERATURE: 98 F | DIASTOLIC BLOOD PRESSURE: 83 MMHG | HEART RATE: 49 BPM | HEIGHT: 78 IN | WEIGHT: 247.5 LBS | BODY MASS INDEX: 28.63 KG/M2 | OXYGEN SATURATION: 100 %

## 2022-10-25 DIAGNOSIS — N20.0 KIDNEY STONE: Primary | ICD-10-CM

## 2022-10-25 LAB — GLUCOSE BLD STRIP.AUTO-MCNC: 127 MG/DL (ref 70–110)

## 2022-10-25 PROCEDURE — 74011250636 HC RX REV CODE- 250/636

## 2022-10-25 PROCEDURE — 77030010103 HC SEAL PRT ENDOSC OCOA -B: Performed by: UROLOGY

## 2022-10-25 PROCEDURE — 2709999900 HC NON-CHARGEABLE SUPPLY: Performed by: UROLOGY

## 2022-10-25 PROCEDURE — 74011250636 HC RX REV CODE- 250/636: Performed by: UROLOGY

## 2022-10-25 PROCEDURE — C1769 GUIDE WIRE: HCPCS | Performed by: UROLOGY

## 2022-10-25 PROCEDURE — 76210000026 HC REC RM PH II 1 TO 1.5 HR: Performed by: UROLOGY

## 2022-10-25 PROCEDURE — 77030020782 HC GWN BAIR PAWS FLX 3M -B: Performed by: UROLOGY

## 2022-10-25 PROCEDURE — 76010000153 HC OR TIME 1.5 TO 2 HR: Performed by: UROLOGY

## 2022-10-25 PROCEDURE — 76210000063 HC OR PH I REC FIRST 0.5 HR: Performed by: UROLOGY

## 2022-10-25 PROCEDURE — C1758 CATHETER, URETERAL: HCPCS | Performed by: UROLOGY

## 2022-10-25 PROCEDURE — 74011250637 HC RX REV CODE- 250/637: Performed by: ANESTHESIOLOGY

## 2022-10-25 PROCEDURE — 77030040361 HC SLV COMPR DVT MDII -B: Performed by: UROLOGY

## 2022-10-25 PROCEDURE — 74011000258 HC RX REV CODE- 258: Performed by: UROLOGY

## 2022-10-25 PROCEDURE — 74011000250 HC RX REV CODE- 250

## 2022-10-25 PROCEDURE — 74420 UROGRAPHY RTRGR +-KUB: CPT

## 2022-10-25 PROCEDURE — C2617 STENT, NON-COR, TEM W/O DEL: HCPCS | Performed by: UROLOGY

## 2022-10-25 PROCEDURE — 82962 GLUCOSE BLOOD TEST: CPT

## 2022-10-25 PROCEDURE — 76060000034 HC ANESTHESIA 1.5 TO 2 HR: Performed by: UROLOGY

## 2022-10-25 DEVICE — VARIABLE LENGTH URETERAL STENT
Type: IMPLANTABLE DEVICE | Site: URETER | Status: FUNCTIONAL
Brand: CONTOUR VL™

## 2022-10-25 RX ORDER — MIDAZOLAM HYDROCHLORIDE 1 MG/ML
INJECTION, SOLUTION INTRAMUSCULAR; INTRAVENOUS AS NEEDED
Status: DISCONTINUED | OUTPATIENT
Start: 2022-10-25 | End: 2022-10-25 | Stop reason: HOSPADM

## 2022-10-25 RX ORDER — HYDROMORPHONE HYDROCHLORIDE 1 MG/ML
0.5 INJECTION, SOLUTION INTRAMUSCULAR; INTRAVENOUS; SUBCUTANEOUS
Status: DISCONTINUED | OUTPATIENT
Start: 2022-10-25 | End: 2022-10-25 | Stop reason: HOSPADM

## 2022-10-25 RX ORDER — OXYCODONE HYDROCHLORIDE 5 MG/1
5 TABLET ORAL ONCE
Status: COMPLETED | OUTPATIENT
Start: 2022-10-25 | End: 2022-10-25

## 2022-10-25 RX ORDER — SODIUM CHLORIDE 9 MG/ML
125 INJECTION, SOLUTION INTRAVENOUS CONTINUOUS
Status: DISCONTINUED | OUTPATIENT
Start: 2022-10-25 | End: 2022-10-25 | Stop reason: HOSPADM

## 2022-10-25 RX ORDER — PROPOFOL 10 MG/ML
INJECTION, EMULSION INTRAVENOUS AS NEEDED
Status: DISCONTINUED | OUTPATIENT
Start: 2022-10-25 | End: 2022-10-25 | Stop reason: HOSPADM

## 2022-10-25 RX ORDER — HYDROCODONE BITARTRATE AND ACETAMINOPHEN 5; 325 MG/1; MG/1
1 TABLET ORAL
Qty: 15 TABLET | Refills: 0 | Status: SHIPPED | OUTPATIENT
Start: 2022-10-25 | End: 2022-10-30

## 2022-10-25 RX ORDER — DIPHENHYDRAMINE HYDROCHLORIDE 50 MG/ML
12.5 INJECTION, SOLUTION INTRAMUSCULAR; INTRAVENOUS
Status: DISCONTINUED | OUTPATIENT
Start: 2022-10-25 | End: 2022-10-25 | Stop reason: HOSPADM

## 2022-10-25 RX ORDER — PROCHLORPERAZINE EDISYLATE 5 MG/ML
5 INJECTION INTRAMUSCULAR; INTRAVENOUS ONCE
Status: DISCONTINUED | OUTPATIENT
Start: 2022-10-25 | End: 2022-10-25 | Stop reason: HOSPADM

## 2022-10-25 RX ORDER — ALBUTEROL SULFATE 0.83 MG/ML
2.5 SOLUTION RESPIRATORY (INHALATION)
Status: DISCONTINUED | OUTPATIENT
Start: 2022-10-25 | End: 2022-10-25 | Stop reason: HOSPADM

## 2022-10-25 RX ORDER — SODIUM CHLORIDE 0.9 % (FLUSH) 0.9 %
5-40 SYRINGE (ML) INJECTION EVERY 8 HOURS
Status: DISCONTINUED | OUTPATIENT
Start: 2022-10-25 | End: 2022-10-25 | Stop reason: HOSPADM

## 2022-10-25 RX ORDER — CEPHALEXIN 500 MG/1
500 CAPSULE ORAL 2 TIMES DAILY
Qty: 10 CAPSULE | Refills: 0 | Status: SHIPPED | OUTPATIENT
Start: 2022-10-25 | End: 2022-10-30

## 2022-10-25 RX ORDER — SODIUM CHLORIDE 0.9 % (FLUSH) 0.9 %
5-40 SYRINGE (ML) INJECTION AS NEEDED
Status: DISCONTINUED | OUTPATIENT
Start: 2022-10-25 | End: 2022-10-25 | Stop reason: HOSPADM

## 2022-10-25 RX ORDER — ONDANSETRON 2 MG/ML
INJECTION INTRAMUSCULAR; INTRAVENOUS AS NEEDED
Status: DISCONTINUED | OUTPATIENT
Start: 2022-10-25 | End: 2022-10-25 | Stop reason: HOSPADM

## 2022-10-25 RX ORDER — FENTANYL CITRATE 50 UG/ML
25 INJECTION, SOLUTION INTRAMUSCULAR; INTRAVENOUS AS NEEDED
Status: DISCONTINUED | OUTPATIENT
Start: 2022-10-25 | End: 2022-10-25 | Stop reason: HOSPADM

## 2022-10-25 RX ORDER — FENTANYL CITRATE 50 UG/ML
INJECTION, SOLUTION INTRAMUSCULAR; INTRAVENOUS AS NEEDED
Status: DISCONTINUED | OUTPATIENT
Start: 2022-10-25 | End: 2022-10-25 | Stop reason: HOSPADM

## 2022-10-25 RX ORDER — LIDOCAINE HYDROCHLORIDE 20 MG/ML
INJECTION, SOLUTION EPIDURAL; INFILTRATION; INTRACAUDAL; PERINEURAL AS NEEDED
Status: DISCONTINUED | OUTPATIENT
Start: 2022-10-25 | End: 2022-10-25 | Stop reason: HOSPADM

## 2022-10-25 RX ORDER — DEXAMETHASONE SODIUM PHOSPHATE 4 MG/ML
INJECTION, SOLUTION INTRA-ARTICULAR; INTRALESIONAL; INTRAMUSCULAR; INTRAVENOUS; SOFT TISSUE AS NEEDED
Status: DISCONTINUED | OUTPATIENT
Start: 2022-10-25 | End: 2022-10-25 | Stop reason: HOSPADM

## 2022-10-25 RX ORDER — SODIUM CHLORIDE, SODIUM LACTATE, POTASSIUM CHLORIDE, CALCIUM CHLORIDE 600; 310; 30; 20 MG/100ML; MG/100ML; MG/100ML; MG/100ML
100 INJECTION, SOLUTION INTRAVENOUS CONTINUOUS
Status: DISCONTINUED | OUTPATIENT
Start: 2022-10-25 | End: 2022-10-25 | Stop reason: HOSPADM

## 2022-10-25 RX ORDER — NALOXONE HYDROCHLORIDE 0.4 MG/ML
0.1 INJECTION, SOLUTION INTRAMUSCULAR; INTRAVENOUS; SUBCUTANEOUS AS NEEDED
Status: DISCONTINUED | OUTPATIENT
Start: 2022-10-25 | End: 2022-10-25 | Stop reason: HOSPADM

## 2022-10-25 RX ADMIN — ONDANSETRON HYDROCHLORIDE 4 MG: 2 INJECTION INTRAMUSCULAR; INTRAVENOUS at 14:42

## 2022-10-25 RX ADMIN — LIDOCAINE HYDROCHLORIDE 100 MG: 20 INJECTION, SOLUTION INTRAVENOUS at 14:28

## 2022-10-25 RX ADMIN — MIDAZOLAM 2 MG: 1 INJECTION INTRAMUSCULAR; INTRAVENOUS at 13:43

## 2022-10-25 RX ADMIN — DEXAMETHASONE SODIUM PHOSPHATE 4 MG: 4 INJECTION, SOLUTION INTRAMUSCULAR; INTRAVENOUS at 14:42

## 2022-10-25 RX ADMIN — OXYCODONE 5 MG: 5 TABLET ORAL at 16:56

## 2022-10-25 RX ADMIN — SODIUM CHLORIDE 125 ML/HR: 9 INJECTION, SOLUTION INTRAVENOUS at 12:40

## 2022-10-25 RX ADMIN — FENTANYL CITRATE 100 MCG: 50 INJECTION, SOLUTION INTRAMUSCULAR; INTRAVENOUS at 13:43

## 2022-10-25 RX ADMIN — CEFTRIAXONE SODIUM 2 G: 2 INJECTION, POWDER, FOR SOLUTION INTRAMUSCULAR; INTRAVENOUS at 14:37

## 2022-10-25 RX ADMIN — PROPOFOL 250 MG: 10 INJECTION, EMULSION INTRAVENOUS at 14:28

## 2022-10-25 NOTE — PERIOP NOTES
TRANSFER - IN REPORT:    Verbal report received from OR nurse and [facility](name) on [facility]  being received from OR(unit) for routine post - op      Report consisted of patients Situation, Background, Assessment and   Recommendations(SBAR). Information from the following report(s) SBAR, OR Summary, Procedure Summary, Intake/Output, MAR, Recent Results, and Med Rec Status was reviewed with the receiving nurse. Opportunity for questions and clarification was provided. Assessment completed upon patients arrival to unit and care assumed.

## 2022-10-25 NOTE — PERIOP NOTES
TRANSFER - IN REPORT:    Verbal report received from Edin Robert (name) on [facility]  being received from [employer]) for routine post - op      Report consisted of patients Situation, Background, Assessment and   Recommendations(SBAR). Information from the following report(s) SBAR, Kardex, and OR Summary was reviewed with the receiving nurse. Opportunity for questions and clarification was provided. Assessment completed upon patients arrival to unit and care assumed.

## 2022-10-25 NOTE — PERIOP NOTES
Elizabeth LUTHER notified that pt took ASA and Plavix on Sunday night @1900, the clearance note stated for pt to stop 5 days prior. Awaiting further orders at this time.

## 2022-10-25 NOTE — DISCHARGE INSTRUCTIONS
DISCHARGE SUMMARY from Nurse    PATIENT INSTRUCTIONS:    After general anesthesia or intravenous sedation, for 24 hours or while taking prescription Narcotics:  Limit your activities  Do not drive and operate hazardous machinery  Do not make important personal or business decisions  Do  not drink alcoholic beverages  If you have not urinated within 8 hours after discharge, please contact your surgeon on call. Report the following to your surgeon:  Excessive pain, swelling, redness or odor of or around the surgical area  Temperature over 100.5  Nausea and vomiting lasting longer than 4 hours or if unable to take medications  Any signs of decreased circulation or nerve impairment to extremity: change in color, persistent  numbness, tingling, coldness or increase pain  Any questions    What to do at Home:  Recommended activity: Activity as tolerated and no driving for today        *  Please give a list of your current medications to your Primary Care Provider. *  Please update this list whenever your medications are discontinued, doses are      changed, or new medications (including over-the-counter products) are added. *  Please carry medication information at all times in case of emergency situations. These are general instructions for a healthy lifestyle:    No smoking/ No tobacco products/ Avoid exposure to second hand smoke  Surgeon General's Warning:  Quitting smoking now greatly reduces serious risk to your health. Obesity, smoking, and sedentary lifestyle greatly increases your risk for illness    A healthy diet, regular physical exercise & weight monitoring are important for maintaining a healthy lifestyle    You may be retaining fluid if you have a history of heart failure or if you experience any of the following symptoms:  Weight gain of 3 pounds or more overnight or 5 pounds in a week, increased swelling in our hands or feet or shortness of breath while lying flat in bed.   Please call your doctor as soon as you notice any of these symptoms; do not wait until your next office visit. The discharge information has been reviewed with the patient and caregiver. The patient and caregiver verbalized understanding. Discharge medications reviewed with the patient and caregiver and appropriate educational materials and side effects teaching were provided. Patient armband removed and shredded   ___________________________________________________________________________________________________________________________________     Cystoscopy: What to Expect at [address]     A cystoscopy is a procedure that lets a doctor look inside of the bladder and the urethra. The urethra is the tube that carries urine from the bladder to outside the body. The doctor uses a thin, lighted tool called a cystoscope. Your bladder is filled with fluid. This stretches the bladder so that your doctor can look closely at the inside of your bladder. After the cystoscopy, your urethra may be sore at first, and it may burn when you urinate for the first few days after the procedure. You may feel the need to urinate more often, and your urine may be pink. These symptoms should get better in 1 or 2 days. You will probably be able to go back to most of your usual activities in 1 or 2 days. This care sheet gives you a general idea about how long it will take for you to recover. But each person recovers at a different pace. Follow the steps below to get better as quickly as possible. How can you care for yourself at home? Activity    Rest when you feel tired. Getting enough sleep will help you recover. Try to walk each day. Start by walking a little more than you did the day before. Bit by bit, increase the amount you walk. Walking boosts blood flow and helps prevent pneumonia and constipation.      Avoid strenuous activities, such as bicycle riding, jogging, weight lifting, or aerobic exercise, until your doctor says it is okay. Ask your doctor when you can drive again. Most people are able to return to work within 1 or 2 days after the procedure. You may shower and take baths as usual.     Ask your doctor when it is okay for you to have sex. Diet    You can eat your normal diet. If your stomach is upset, try bland, low-fat foods like plain rice, broiled chicken, toast, and yogurt. Drink plenty of fluids (unless your doctor tells you not to). Medicines    Take pain medicines exactly as directed. If the doctor gave you a prescription medicine for pain, take it as prescribed. If you are not taking a prescription pain medicine, ask your doctor if you can take an over-the-counter medicine. If you think your pain medicine is making you sick to your stomach: Take your medicine after meals (unless your doctor has told you not to). Ask your doctor for a different pain medicine. If your doctor prescribed antibiotics, take them as directed. Do not stop taking them just because you feel better. You need to take the full course of antibiotics. Follow-up care is a key part of your treatment and safety. Be sure to make and go to all appointments, and call your doctor if you are having problems. It's also a good idea to know your test results and keep a list of the medicines you take. When should you call for help? Call 911 anytime you think you may need emergency care. For example, call if:    You passed out (lost consciousness). You have severe trouble breathing. You have sudden chest pain and shortness of breath, or you cough up blood. You have severe belly pain. Call your doctor now or seek immediate medical care if:    You are sick to your stomach or cannot keep fluids down. Your urine is still red or you see blood clots after you have urinated several times. You have trouble passing urine or stool, especially if you have pain or swelling in your lower belly.      You have signs of a blood clot, such as:  Pain in your calf, back of the knee, thigh, or groin. Redness and swelling in your leg or groin. You develop a fever or severe chills. You have pain in your back just below your rib cage. This is called flank pain. Watch closely for changes in your health, and be sure to contact your doctor if:    You have pain or burning when you urinate. A burning feeling is normal for a day or two after the test, but call if it does not get better. You have a frequent urge to urinate but can pass only small amounts of urine. Your urine is pink, red, or cloudy, or smells bad. It is normal for the urine to have a pinkish color for a few days after the test, but call if it does not get better. Where can you learn more? Go to http://www.gray.com/  Enter C842 in the search box to learn more about \"Cystoscopy: What to Expect at Home. \"  Current as of: June 16, 2022               Content Version: 13.4  © 2006-2022 [employer]. Care instructions adapted under license by [employer] (which disclaims liability or warranty for this information). If you have questions about a medical condition or this instruction, always ask your healthcare professional. [address] any warranty or liability for your use of this information.

## 2022-10-25 NOTE — BRIEF OP NOTE
Brief Postoperative Note    Patient: Lino Kelly  YOB: 1960  MRN: [id number]    Date of Procedure: 10/25/2022     Pre-Op Diagnosis: KIDNEY STONE    Post-Op Diagnosis: Same as preoperative diagnosis. Procedure(s):  CYSTOSCOPY,  LEFT STENT REMOVAL,  RIGHT RETROGRADE PYELOGRAM WITH INTERPRETATION,  RIGHT URETEROSCOPY with LASER LITHOTRIPSY WITH HOLMIUM,  RIGHT STENT CHANGE WITH C-ARM    Surgeon(s):  Keivn Samuels MD    Surgical Assistant: none    Anesthesia: General    Estimated Blood Loss (mL): Minimal    Complications: None    Specimens: None      Implants:   Implant Name Type Inv. Item Serial No.  Lot No. LRB No. Used Action   STENT URET 4.8FR [id number] PERCFLX HYDR+ SFT PGTL TAPR TIP - [id number]  STENT URET 4.8FR [id number] PERCFLX HYDR+ SFT PGTL TAPR TIP  [employer] Filter [employer]WD [id number] Right 1 Implanted       Drains: * No LDAs found *    Findings: he had a moderate fossa navicularis stricture. His right ureter was widely patent this time. The stone in the right upper pole was dense and easily treated.       Electronically Signed by Benji Jacobs MD on 10/25/2022 at 3:21 PM

## 2022-10-25 NOTE — ANESTHESIA PREPROCEDURE EVALUATION
Relevant Problems   CARDIOVASCULAR   (+) NSTEMI (non-ST elevated myocardial infarction) ([facility] [address]      RENAL FAILURE   (+) FERMIN (acute kidney injury) ([facility] [address]   (+) Kidney stone      PERSONAL HX & FAMILY HX OF CANCER   (+) Prostate cancer (HCC)       Anesthetic History   No history of anesthetic complications            Review of Systems / Medical History  Patient summary reviewed, nursing notes reviewed and pertinent labs reviewed    Pulmonary  Within defined limits            Pertinent negatives: No sleep apnea and smoker     Neuro/Psych   Within defined limits           Cardiovascular    Hypertension          Past MI and CAD  Pertinent negatives: No cardiac stents       GI/Hepatic/Renal             Pertinent negatives: No hiatal hernia and GERD   Endo/Other    Diabetes: well controlled, type 2  Hypothyroidism: well controlled       Other Findings              Physical Exam    Airway  Mallampati: II  TM Distance: > 6 cm  Neck ROM: normal range of motion   Mouth opening: Normal     Cardiovascular    Rhythm: regular  Rate: normal         Dental         Pulmonary  Breath sounds clear to auscultation               Abdominal  GI exam deferred       Other Findings            Anesthetic Plan    ASA: 3  Anesthesia type: general          Induction: Intravenous  Anesthetic plan and risks discussed with: Patient
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
## 2022-10-25 NOTE — ANESTHESIA POSTPROCEDURE EVALUATION
Procedure(s):  CYSTOSCOPY LEFT STENT REMOVAL RIGHT RETROGRADE PYELOGRAM WITH INTERPRETATION,  RIGHT URETEROSCOPY LASER LITHOTRIPSY WITH HOLMIUM RIGHT STENT CHANGE WITH C-ARM. general    Anesthesia Post Evaluation      Multimodal analgesia: multimodal analgesia used between 6 hours prior to anesthesia start to PACU discharge  Patient location during evaluation: PACU  Patient participation: complete - patient participated  Level of consciousness: awake and alert  Pain score: 0  Airway patency: patent  Anesthetic complications: no  Cardiovascular status: acceptable  Respiratory status: acceptable  Hydration status: acceptable  Post anesthesia nausea and vomiting:  none  Final Post Anesthesia Temperature Assessment:  Normothermia (36.0-37.5 degrees C)      INITIAL Post-op Vital signs:   Vitals Value Taken Time   /93 10/25/22 1530   Temp 36.7 °C (98 °F) 10/25/22 1519   Pulse 53 10/25/22 1530   Resp 11 10/25/22 1530   SpO2 100 % 10/25/22 1530   Vitals shown include unvalidated device data.

## 2022-10-25 NOTE — PERIOP NOTES
Patient c/o pain 6/10. Gave 5mg tablet of roxicodone per MD order. Unable to assess pain afterwards, as patient wanted to go home. Discharged to home without incident.

## 2022-10-25 NOTE — PERIOP NOTES
Patient up to the bathroom with assist, and voided. Assisted back to chair. Vitals stable. Tolerating fluids. Family at his side, and call bell within reach.

## 2022-10-25 NOTE — PERIOP NOTES
TRANSFER - OUT REPORT:    Verbal report given to Eva LUTHER(name) on [facility]  being transferred to phase 2(unit) for routine post - op       Report consisted of patients Situation, Background, Assessment and   Recommendations(SBAR). Information from the following report(s) SBAR, OR Summary, Procedure Summary, Intake/Output, MAR, Recent Results, and Med Rec Status was reviewed with the receiving nurse. Lines:   Peripheral IV 10/25/22 Left; Outer Hand (Active)   Site Assessment Clean, dry, & intact 10/25/22 1535   Phlebitis Assessment 0 10/25/22 1535   Infiltration Assessment 0 10/25/22 1535   Dressing Status Clean, dry, & intact 10/25/22 1535   Dressing Type Tape;Transparent 10/25/22 1535   Hub Color/Line Status Infusing 10/25/22 1535   Alcohol Cap Used No 10/25/22 1240        Opportunity for questions and clarification was provided.       Patient transported with:   Bizzby

## 2022-10-26 NOTE — OP NOTES
[facility]  OPERATIVE REPORT    Name:  Yeni Robertson  MR#:   [id number]  :  1960  ACCOUNT #:  [de-identified]  DATE OF SERVICE:  10/25/2022    PREOPERATIVE DIAGNOSIS:  Kidney stone. POSTOPERATIVE DIAGNOSIS:  Kidney stone. PROCEDURE PERFORMED:  Cystoscopy, left stent removal, right retrograde pyelogram with interpretation, right ureteroscopy, holmium laser lithotripsy and right stent change. SURGEON:  Chris Bowers MD    ASSISTANT:  None. ANESTHESIA:  General.    COMPLICATIONS:  None. SPECIMENS REMOVED:  None. IMPLANTS:  A 4.8 Japanese variable-length stent with the string on. DRAINS:  None. ESTIMATED BLOOD LOSS:  Minimal.    FINDINGS:  The patient had a moderate fossa navicularis stricture that was dilated up to 30-Japanese easily. His right ureter was widely patent. The stone in the right upper pole was dense but easily treated into small fragments. INDICATIONS:  The patient is a 76-year-old gentleman with a history of stones who had attempted ureteroscopy last month, but the ureter was too tight and a stent was placed on the right side. He had successful ureteroscopy on the left. He presents for completion. PROCEDURE:  Preoperatively, the risks and benefits of surgery were described to the patient. The risks include but are not limited to bleeding, infection, injury to bladder, injury to the ureter, injury to the kidney, possible need for future procedure to be made stone-free. The patient understood the risks and signed the informed consent. The patient was taken to the operating room and placed on the OR table in supine position. He was administered general anesthetic. He was administered intravenous antibiotics. He was placed in dorsal lithotomy position, and prepped and draped in the usual sterile manner.   A 22-Japanese cystoscope and sheath were then inserted transurethrally atraumatically, but once I got towards the fossa navicularis, the urethra was pretty tight. I then passed a Sensor wire through the urethra into the bladder as confirmed by fluoroscopy. I then sequentially dilated him from 16-Irish up to 30-Irish without difficulty. The wire was removed. I then passed my scope without difficulty all under direct vision. There were no other strictures seen. There was no bladder neck contracture. The prostate was absent. Once in the bladder, there were stents emanating from each ureter. There were no tumors, no stones, no areas of concern. I used alligator forceps and I removed each stent. I then reinserted the cystoscope. I then cannulated the right ureteral orifice with a 5-Irish open-ended ureteral catheter. Retrograde pyelogram was done in the usual manner using 15 mL of Visipaque dye. There were no filling defects along entire length of the ureter. The ureter appeared widely patent. There were no areas of narrowing. There was no hydronephrosis. There was no blunting of calyces. There were no filling defects up in the kidney. Next, I passed a Sensor wire through the open-ended catheter up to the kidney. The open-ended catheter was removed. I then removed my scope. I then passed a dual-lumen catheter and passed a second Sensor wire up to the kidney. The dual-lumen catheter was removed. One wire was a safety wire and the other wire was a working wire. I then passed a flexible ureteroscope over the working wire without difficulty all the way up into the kidney without any obstruction whatsoever. Panpyeloscopy was done. I found a stone in the upper pole and a Michael's plaque in the lower pole. Using a 272-micron holmium laser fiber, I broke up the stone in the upper pole into very tiny fragments, all of which were small and they did not need to be extracted. There were no significant fragments, only dust and debris. I then went down into the lower pole and lasered the Michael's plaque.   There was no significant bleeding or problems or injury. I then went to every zuly and did not see any stones of any significant size. I then slowly backed the scope down the ureter. The ureter was intact and unharmed. I then reinserted the cystoscope. Over the safety wire, I passed a 4.8-Barbadian variable-length stent with the string on. The wire was removed. Fluoroscopy confirmed the proximal coil within the kidney. The distal coil was noted to be in the bladder by direct vision. At this point, the patient was taken out of lithotomy position, revived from anesthesia and taken to Recovery in stable condition.       MD FELI [id number]  D:  10/25/2022 15:28  T:  10/25/2022 23:41  JOB #:  [id number]

## 2023-01-31 NOTE — PROGRESS NOTES
Problem: At Risk for Falls  Goal: # Patient does not fall  1/30/2023 1904 by Aneta Sun RN  Outcome: Outcome Met, Continue evaluating goal progress toward completion  1/30/2023 1903 by Aneta Sun RN  Outcome: Outcome Met, Continue evaluating goal progress toward completion  Goal: # Takes action to control fall-related risks  1/30/2023 1904 by Aneta Sun RN  Outcome: Outcome Met, Continue evaluating goal progress toward completion  1/30/2023 1903 by Aneta Sun RN  Outcome: Outcome Met, Continue evaluating goal progress toward completion  Goal: # Verbalizes understanding of fall risk/precautions  Description: Document education using the patient education activity  Outcome: Outcome Met, Continue evaluating goal progress toward completion     Problem: At Risk for Injury Due to Fall  Goal: # Patient does not fall  Outcome: Outcome Met, Continue evaluating goal progress toward completion  Goal: # Takes action to control condition specific risks  Outcome: Outcome Met, Continue evaluating goal progress toward completion     Problem: At Risk for Injury Due to Fall  Goal: # Verbalizes understanding of fall-related injury personal risks  Description: Document education using the patient education activity  Outcome: Outcome Not Met, Continue to Monitor     Problem: Impaired Physical Mobility  Goal: # Bed mobility, ambulation, and ADLs are maintained or returned to baseline during hospitalization  1/30/2023 1904 by Aneta Sun RN  Outcome: Outcome Not Met, Continue to Monitor  1/30/2023 1903 by Aneta Sun RN  Outcome: Outcome Not Met, Continue to Monitor      Comprehensive Nutrition Assessment    Type and Reason for Visit: NAE nutrition re-screen/LOS    Nutrition Recommendations/Plan:   Continue current diet  Monitor need for ONS    Adjust insulin to promote euglycemia  Continue nystatin for oral thrush    Nutrition Assessment:  Presents to hospital with left flank pains, development over the previous two days. CT showed a 5 mm obstructing stone in the distal left ureter with left perinephric inflammation. He underwent cystoscopy with left ureteral stenting on 12/6. Urology following. Intakes and appetite appear good through admit. Per EMR documentation, consuming >75% of trays. Labs: Glu , H/H 12/35.9, bun 33, Cr 2.15, Ca 8.3. Meds: statin, rocephin, heparin, dilaudid, insulin, nystatin, PPI,     Malnutrition Assessment:  Malnutrition Status:  No malnutrition    Context:  Acute illness       Current Nutrition Therapies:  ADULT DIET Easy to Chew; 5 carb choices (75 gm/meal); No Salt Added (3-4 gm); Low Potassium (Less than 3000 mg/day);  Low Phosphorus (Less than 1000 mg); 1000 ml     Electronically signed by Gabriella Cornelius RD on 12/13/2021 at 10:19 AM    Contact: [phone]

## 2023-08-03 ENCOUNTER — HOSPITAL ENCOUNTER (OUTPATIENT)
Facility: HOSPITAL | Age: 63
Discharge: HOME | End: 2023-08-03
Payer: COMMERCIAL

## 2023-08-03 LAB
ANION GAP SERPL CALC-SCNC: 5 MMOL/L (ref 3–18)
APPEARANCE UR: CLEAR
BILIRUB UR QL: NEGATIVE
BUN SERPL-MCNC: 21 MG/DL (ref 7–18)
BUN/CREAT SERPL: 13 (ref 12–20)
CALCIUM SERPL-MCNC: 8.8 MG/DL (ref 8.5–10.1)
CHLORIDE SERPL-SCNC: 105 MMOL/L (ref 100–111)
CO2 SERPL-SCNC: 28 MMOL/L (ref 21–32)
COLOR UR: YELLOW
CREAT SERPL-MCNC: 1.66 MG/DL (ref 0.6–1.3)
CREAT UR-MCNC: 83 MG/DL (ref 30–125)
CREAT UR-MCNC: 88 MG/DL (ref 30–125)
GLUCOSE SERPL-MCNC: 181 MG/DL (ref 74–99)
GLUCOSE UR STRIP.AUTO-MCNC: 500 MG/DL
HGB BLD-MCNC: 13.4 G/DL (ref 13–16)
HGB UR QL STRIP: NEGATIVE
KETONES UR QL STRIP.AUTO: NEGATIVE MG/DL
LEUKOCYTE ESTERASE UR QL STRIP.AUTO: NEGATIVE
MICROALBUMIN UR-MCNC: 0.57 MG/DL (ref 0–3)
MICROALBUMIN/CREAT UR-RTO: 6 MG/G (ref 0–30)
NITRITE UR QL STRIP.AUTO: NEGATIVE
PH UR STRIP: 5.5 (ref 5–8)
POTASSIUM SERPL-SCNC: 3.1 MMOL/L (ref 3.5–5.5)
PROT UR STRIP-MCNC: NEGATIVE MG/DL
PROT UR-MCNC: 13 MG/DL
PROT/CREAT UR-RTO: 0.2
SODIUM SERPL-SCNC: 138 MMOL/L (ref 136–145)
SP GR UR REFRACTOMETRY: 1.03 (ref 1–1.03)
URATE SERPL-MCNC: 8 MG/DL (ref 2.6–7.2)
UROBILINOGEN UR QL STRIP.AUTO: 0.2 EU/DL (ref 0.2–1)

## 2023-08-03 PROCEDURE — 81003 URINALYSIS AUTO W/O SCOPE: CPT

## 2023-08-03 PROCEDURE — 82043 UR ALBUMIN QUANTITATIVE: CPT

## 2023-08-03 PROCEDURE — 83970 ASSAY OF PARATHORMONE: CPT

## 2023-08-03 PROCEDURE — 84156 ASSAY OF PROTEIN URINE: CPT

## 2023-08-03 PROCEDURE — 84550 ASSAY OF BLOOD/URIC ACID: CPT

## 2023-08-03 PROCEDURE — 36415 COLL VENOUS BLD VENIPUNCTURE: CPT

## 2023-08-03 PROCEDURE — 82570 ASSAY OF URINE CREATININE: CPT

## 2023-08-03 PROCEDURE — 82306 VITAMIN D 25 HYDROXY: CPT

## 2023-08-03 PROCEDURE — 85018 HEMOGLOBIN: CPT

## 2023-08-03 PROCEDURE — 80048 BASIC METABOLIC PNL TOTAL CA: CPT

## 2023-08-04 LAB
25(OH)D3 SERPL-MCNC: 23.7 NG/ML (ref 30–100)
CALCIUM SERPL-MCNC: 8.9 MG/DL (ref 8.5–10.1)
PTH-INTACT SERPL-MCNC: 110.7 PG/ML (ref 18.4–88)

## 2023-08-07 LAB
FAX TO NUMBER: NORMAL
TEST RESULTS FAXED TO: NORMAL

## 2024-04-17 ENCOUNTER — TRANSCRIBE ORDERS (OUTPATIENT)
Facility: HOSPITAL | Age: 64
End: 2024-04-17

## 2024-04-17 ENCOUNTER — HOSPITAL ENCOUNTER (OUTPATIENT)
Facility: HOSPITAL | Age: 64
Discharge: HOME | End: 2024-04-20
Payer: COMMERCIAL

## 2024-04-17 DIAGNOSIS — I25.810 CAD OF AUTOLOGOUS ARTERY BYPASS GRAFT WITHOUT ANGINA: ICD-10-CM

## 2024-04-17 DIAGNOSIS — I25.810 CAD OF AUTOLOGOUS ARTERY BYPASS GRAFT WITHOUT ANGINA: Primary | ICD-10-CM

## 2024-04-17 LAB
ALBUMIN SERPL-MCNC: 4 G/DL (ref 3.4–5)
ALBUMIN/GLOB SERPL: 1.2 (ref 0.8–1.7)
ALP SERPL-CCNC: 109 U/L (ref 45–117)
ALT SERPL-CCNC: 20 U/L (ref 16–61)
ANION GAP SERPL CALC-SCNC: 3 MMOL/L (ref 3–18)
AST SERPL-CCNC: 11 U/L (ref 10–38)
BASOPHILS # BLD: 0.1 K/UL (ref 0–0.1)
BASOPHILS NFR BLD: 1 % (ref 0–2)
BILIRUB SERPL-MCNC: 0.8 MG/DL (ref 0.2–1)
BUN SERPL-MCNC: 23 MG/DL (ref 7–18)
BUN/CREAT SERPL: 17 (ref 12–20)
CALCIUM SERPL-MCNC: 9.4 MG/DL (ref 8.5–10.1)
CHLORIDE SERPL-SCNC: 104 MMOL/L (ref 100–111)
CO2 SERPL-SCNC: 31 MMOL/L (ref 21–32)
CREAT SERPL-MCNC: 1.38 MG/DL (ref 0.6–1.3)
DIFFERENTIAL METHOD BLD: ABNORMAL
EOSINOPHIL # BLD: 0.3 K/UL (ref 0–0.4)
EOSINOPHIL NFR BLD: 5 % (ref 0–5)
ERYTHROCYTE [DISTWIDTH] IN BLOOD BY AUTOMATED COUNT: 12.2 % (ref 11.6–14.5)
GLOBULIN SER CALC-MCNC: 3.3 G/DL (ref 2–4)
GLUCOSE SERPL-MCNC: 139 MG/DL (ref 74–99)
HCT VFR BLD AUTO: 39.8 % (ref 36–48)
HGB BLD-MCNC: 13.3 G/DL (ref 13–16)
IMM GRANULOCYTES # BLD AUTO: 0 K/UL (ref 0–0.04)
IMM GRANULOCYTES NFR BLD AUTO: 0 % (ref 0–0.5)
INR PPP: 0.9 (ref 0.9–1.1)
LYMPHOCYTES # BLD: 1.4 K/UL (ref 0.9–3.6)
LYMPHOCYTES NFR BLD: 20 % (ref 21–52)
MCH RBC QN AUTO: 30 PG (ref 24–34)
MCHC RBC AUTO-ENTMCNC: 33.4 G/DL (ref 31–37)
MCV RBC AUTO: 89.6 FL (ref 78–100)
MONOCYTES # BLD: 0.9 K/UL (ref 0.05–1.2)
MONOCYTES NFR BLD: 13 % (ref 3–10)
NEUTS SEG # BLD: 4.3 K/UL (ref 1.8–8)
NEUTS SEG NFR BLD: 61 % (ref 40–73)
NRBC # BLD: 0 K/UL (ref 0–0.01)
NRBC BLD-RTO: 0 PER 100 WBC
PLATELET # BLD AUTO: 259 K/UL (ref 135–420)
PMV BLD AUTO: 9.6 FL (ref 9.2–11.8)
POTASSIUM SERPL-SCNC: 3.8 MMOL/L (ref 3.5–5.5)
PROT SERPL-MCNC: 7.3 G/DL (ref 6.4–8.2)
PROTHROMBIN TIME: 12.4 SEC (ref 11.9–14.7)
RBC # BLD AUTO: 4.44 M/UL (ref 4.35–5.65)
SODIUM SERPL-SCNC: 138 MMOL/L (ref 136–145)
WBC # BLD AUTO: 7.1 K/UL (ref 4.6–13.2)

## 2024-04-17 PROCEDURE — 36415 COLL VENOUS BLD VENIPUNCTURE: CPT

## 2024-04-17 PROCEDURE — 85610 PROTHROMBIN TIME: CPT

## 2024-04-17 PROCEDURE — 80053 COMPREHEN METABOLIC PANEL: CPT

## 2024-04-17 PROCEDURE — 85025 COMPLETE CBC W/AUTO DIFF WBC: CPT

## 2024-04-29 RX ORDER — TAMSULOSIN HYDROCHLORIDE 0.4 MG/1
0.4 CAPSULE ORAL DAILY
COMMUNITY

## 2024-04-29 RX ORDER — NITROGLYCERIN 0.4 MG/1
0.4 TABLET SUBLINGUAL EVERY 5 MIN PRN
COMMUNITY

## 2024-04-29 RX ORDER — DAPAGLIFLOZIN 5 MG/1
5 TABLET, FILM COATED ORAL EVERY MORNING
Status: ON HOLD | COMMUNITY
End: 2024-05-01 | Stop reason: HOSPADM

## 2024-04-29 RX ORDER — ERGOCALCIFEROL 1.25 MG/1
50000 CAPSULE ORAL WEEKLY
COMMUNITY

## 2024-04-29 RX ORDER — PANTOPRAZOLE SODIUM 40 MG/1
40 TABLET, DELAYED RELEASE ORAL DAILY
Status: ON HOLD | COMMUNITY
End: 2024-05-01 | Stop reason: HOSPADM

## 2024-04-29 RX ORDER — M-VIT,TX,IRON,MINS/CALC/FOLIC 27MG-0.4MG
1 TABLET ORAL DAILY
Status: ON HOLD | COMMUNITY
End: 2024-05-01 | Stop reason: HOSPADM

## 2024-04-29 RX ORDER — LEVOTHYROXINE SODIUM 0.03 MG/1
25 TABLET ORAL DAILY
COMMUNITY

## 2024-04-29 RX ORDER — ISOSORBIDE DINITRATE 5 MG/1
5 TABLET ORAL 2 TIMES DAILY
Status: ON HOLD | COMMUNITY
End: 2024-05-01 | Stop reason: HOSPADM

## 2024-04-30 ENCOUNTER — HOSPITAL ENCOUNTER (OUTPATIENT)
Facility: HOSPITAL | Age: 64
Setting detail: OBSERVATION
Discharge: HOME | End: 2024-05-01
Attending: INTERNAL MEDICINE | Admitting: INTERNAL MEDICINE
Payer: COMMERCIAL

## 2024-04-30 DIAGNOSIS — I25.118 CORONARY ARTERY DISEASE INVOLVING NATIVE CORONARY ARTERY OF NATIVE HEART WITH OTHER FORM OF ANGINA PECTORIS (HCC): Primary | ICD-10-CM

## 2024-04-30 DIAGNOSIS — R94.39 ABNORMAL NUCLEAR STRESS TEST: ICD-10-CM

## 2024-04-30 DIAGNOSIS — R94.39 ABNORMAL STRESS TEST: ICD-10-CM

## 2024-04-30 DIAGNOSIS — I25.119 CORONARY ARTERY DISEASE WITH ANGINA PECTORIS (HCC): ICD-10-CM

## 2024-04-30 PROBLEM — Z95.820 STATUS POST ANGIOPLASTY WITH STENT: Status: ACTIVE | Noted: 2024-04-30

## 2024-04-30 LAB
ACT BLD: 212 SECS (ref 79–138)
ACT BLD: 266 SECS (ref 79–138)
ECHO BSA: 2.59 M2
GLUCOSE BLD STRIP.AUTO-MCNC: 140 MG/DL (ref 70–110)
HCT VFR BLD AUTO: 37 % (ref 36–48)
HGB BLD-MCNC: 12.3 G/DL (ref 13–16)

## 2024-04-30 PROCEDURE — 2580000003 HC RX 258: Performed by: INTERNAL MEDICINE

## 2024-04-30 PROCEDURE — 99153 MOD SED SAME PHYS/QHP EA: CPT | Performed by: INTERNAL MEDICINE

## 2024-04-30 PROCEDURE — 6370000000 HC RX 637 (ALT 250 FOR IP): Performed by: INTERNAL MEDICINE

## 2024-04-30 PROCEDURE — 99152 MOD SED SAME PHYS/QHP 5/>YRS: CPT | Performed by: INTERNAL MEDICINE

## 2024-04-30 PROCEDURE — 82962 GLUCOSE BLOOD TEST: CPT

## 2024-04-30 PROCEDURE — C1874 STENT, COATED/COV W/DEL SYS: HCPCS | Performed by: INTERNAL MEDICINE

## 2024-04-30 PROCEDURE — C1769 GUIDE WIRE: HCPCS | Performed by: INTERNAL MEDICINE

## 2024-04-30 PROCEDURE — 2709999900 HC NON-CHARGEABLE SUPPLY: Performed by: INTERNAL MEDICINE

## 2024-04-30 PROCEDURE — C1725 CATH, TRANSLUMIN NON-LASER: HCPCS | Performed by: INTERNAL MEDICINE

## 2024-04-30 PROCEDURE — 85014 HEMATOCRIT: CPT

## 2024-04-30 PROCEDURE — G0378 HOSPITAL OBSERVATION PER HR: HCPCS

## 2024-04-30 PROCEDURE — 2500000003 HC RX 250 WO HCPCS: Performed by: INTERNAL MEDICINE

## 2024-04-30 PROCEDURE — 93458 L HRT ARTERY/VENTRICLE ANGIO: CPT | Performed by: INTERNAL MEDICINE

## 2024-04-30 PROCEDURE — 85347 COAGULATION TIME ACTIVATED: CPT

## 2024-04-30 PROCEDURE — 6360000004 HC RX CONTRAST MEDICATION: Performed by: INTERNAL MEDICINE

## 2024-04-30 PROCEDURE — C1894 INTRO/SHEATH, NON-LASER: HCPCS | Performed by: INTERNAL MEDICINE

## 2024-04-30 PROCEDURE — C1887 CATHETER, GUIDING: HCPCS | Performed by: INTERNAL MEDICINE

## 2024-04-30 PROCEDURE — C9600 PERC DRUG-EL COR STENT SING: HCPCS | Performed by: INTERNAL MEDICINE

## 2024-04-30 PROCEDURE — 85018 HEMOGLOBIN: CPT

## 2024-04-30 PROCEDURE — 6360000002 HC RX W HCPCS: Performed by: INTERNAL MEDICINE

## 2024-04-30 DEVICE — XIENCE SIERRA™ EVEROLIMUS ELUTING CORONARY STENT SYSTEM 2.50 MM X 23 MM / RAPID-EXCHANGE
Type: IMPLANTABLE DEVICE | Site: CORONARY | Status: FUNCTIONAL
Brand: XIENCE SIERRA™

## 2024-04-30 RX ORDER — SODIUM CHLORIDE 0.9 % (FLUSH) 0.9 %
5-40 SYRINGE (ML) INJECTION EVERY 12 HOURS SCHEDULED
Status: DISCONTINUED | OUTPATIENT
Start: 2024-04-30 | End: 2024-05-01 | Stop reason: HOSPADM

## 2024-04-30 RX ORDER — SODIUM CHLORIDE 9 MG/ML
INJECTION, SOLUTION INTRAVENOUS CONTINUOUS
Status: DISPENSED | OUTPATIENT
Start: 2024-04-30 | End: 2024-05-01

## 2024-04-30 RX ORDER — LIDOCAINE HYDROCHLORIDE 10 MG/ML
INJECTION, SOLUTION INFILTRATION; PERINEURAL PRN
Status: DISCONTINUED | OUTPATIENT
Start: 2024-04-30 | End: 2024-04-30 | Stop reason: HOSPADM

## 2024-04-30 RX ORDER — POTASSIUM CITRATE 10 MEQ/1
10 TABLET, EXTENDED RELEASE ORAL DAILY
COMMUNITY

## 2024-04-30 RX ORDER — EPTIFIBATIDE 2 MG/ML
INJECTION, SOLUTION INTRAVENOUS PRN
Status: DISCONTINUED | OUTPATIENT
Start: 2024-04-30 | End: 2024-04-30 | Stop reason: HOSPADM

## 2024-04-30 RX ORDER — SODIUM CHLORIDE 9 MG/ML
INJECTION, SOLUTION INTRAVENOUS PRN
Status: DISCONTINUED | OUTPATIENT
Start: 2024-04-30 | End: 2024-05-01 | Stop reason: HOSPADM

## 2024-04-30 RX ORDER — MIDAZOLAM HYDROCHLORIDE 1 MG/ML
INJECTION INTRAMUSCULAR; INTRAVENOUS PRN
Status: DISCONTINUED | OUTPATIENT
Start: 2024-04-30 | End: 2024-04-30 | Stop reason: HOSPADM

## 2024-04-30 RX ORDER — SODIUM CHLORIDE 0.9 % (FLUSH) 0.9 %
5-40 SYRINGE (ML) INJECTION PRN
Status: DISCONTINUED | OUTPATIENT
Start: 2024-04-30 | End: 2024-05-01 | Stop reason: HOSPADM

## 2024-04-30 RX ORDER — VERAPAMIL HYDROCHLORIDE 2.5 MG/ML
INJECTION, SOLUTION INTRAVENOUS PRN
Status: DISCONTINUED | OUTPATIENT
Start: 2024-04-30 | End: 2024-04-30 | Stop reason: HOSPADM

## 2024-04-30 RX ORDER — ATORVASTATIN CALCIUM 20 MG/1
80 TABLET, FILM COATED ORAL NIGHTLY
Status: DISCONTINUED | OUTPATIENT
Start: 2024-04-30 | End: 2024-05-01 | Stop reason: HOSPADM

## 2024-04-30 RX ORDER — ASPIRIN 81 MG/1
81 TABLET ORAL DAILY
Status: DISCONTINUED | OUTPATIENT
Start: 2024-04-30 | End: 2024-05-01 | Stop reason: HOSPADM

## 2024-04-30 RX ORDER — HEPARIN SODIUM 1000 [USP'U]/ML
INJECTION, SOLUTION INTRAVENOUS; SUBCUTANEOUS PRN
Status: DISCONTINUED | OUTPATIENT
Start: 2024-04-30 | End: 2024-04-30 | Stop reason: HOSPADM

## 2024-04-30 RX ORDER — ENOXAPARIN SODIUM 100 MG/ML
30 INJECTION SUBCUTANEOUS EVERY 12 HOURS
Status: DISCONTINUED | OUTPATIENT
Start: 2024-05-01 | End: 2024-05-01 | Stop reason: HOSPADM

## 2024-04-30 RX ORDER — ACETAMINOPHEN 325 MG/1
650 TABLET ORAL EVERY 4 HOURS PRN
Status: DISCONTINUED | OUTPATIENT
Start: 2024-04-30 | End: 2024-05-01 | Stop reason: HOSPADM

## 2024-04-30 RX ADMIN — TICAGRELOR 90 MG: 90 TABLET ORAL at 20:32

## 2024-04-30 RX ADMIN — ASPIRIN 81 MG: 81 TABLET, COATED ORAL at 20:32

## 2024-04-30 RX ADMIN — SODIUM CHLORIDE 75 ML/HR: 9 INJECTION, SOLUTION INTRAVENOUS at 11:20

## 2024-04-30 RX ADMIN — SODIUM CHLORIDE, PRESERVATIVE FREE 10 ML: 5 INJECTION INTRAVENOUS at 20:33

## 2024-04-30 RX ADMIN — ATORVASTATIN CALCIUM 80 MG: 20 TABLET, FILM COATED ORAL at 20:32

## 2024-04-30 NOTE — PROGRESS NOTES
Called in 30 day supply of Brilinta for pt.  Pt wife given # to Cardiac rehab to make appt prior to discharge. Educational info and stent card given to pt. Pt resting dressing to right wrist remains clean and dry.  Neuro vasc assessment of right arm and hand WNL

## 2024-04-30 NOTE — PROGRESS NOTES
Scant amt red drainage noted on 2x2,  dressing removed  antwan pressure to site x10 min,  site redressed,  ice pack applied to site,  neuro vasc assessment of hand and arm WNL

## 2024-04-30 NOTE — DISCHARGE INSTRUCTIONS
HEART CATHETERIZATION/ANGIOGRAPHY DISCHARGE INSTRUCTIONS    Check puncture site frequently for swelling or bleeding. If there is any bleeding, lie down and apply pressure over the area with a clean towel or washcloth. Notify your doctor for any redness, swelling, drainage, or oozing from the puncture site. Notify your doctor for any fever or chills.  If the extremity becomes cold, numb, or painful go to the emergency room  Activity should be limited for the next 24 hours. Avoid any stooping, bending, or strenuous activity for 24 hours. No heavy lifting (anything over 10 pounds) for 5 days.  You may resume your usual diet. Drink more fluids than usual.  Have a responsible person drive you home and stay with you for at least 24 hours after your heart catheterization/angiography.  You may remove bandage from your {ARM/GROIN:15038} in 24 hours. You may shower in 24 hours. No tub baths, hot tubs, or swimming for 1 week. Do not place any lotions, creams, powders, or ointments over puncture site for 1 week. You may place a clean band-aid over the puncture site each day for 5 days. Change daily.  I have read the above instructions and have had the opportunity to ask questions.      Patient: ________________________   Date: 4/30/2024    Witness: _______________________   Date: 4/30/2024     Sedation: Care Instructions  Overview     Sedation is the use of medicine to help you feel relaxed and comfortable during a procedure. The medicine is usually given in a vein (by I.V.). It may be used with numbing medicines.  There are different levels of sedation. They range from being awake but relaxed to being completely unconscious. Which level you have will depend on the procedure and your needs. You will be watched closely by a doctor or nurse during sedation.  Common side effects from sedation include:  Feeling sleepy or tired. (Your doctors and nurses will make sure you aren't too sleepy to go home.)  Feeling dizzy or

## 2024-04-30 NOTE — PROGRESS NOTES
Pharmacist Review and Automatic Dose Adjustment of Prophylactic Enoxaparin    *Review reason for admission/hospital problem list*    The reviewing pharmacist has made an adjustment to the ordered enoxaparin dose or converted to UFH per the approved [facility] protocol and table as identified below.        Collin Prince is a 64 y.o. male.     No results for input(s): \"CREATININE\" in the last 72 hours.    Estimated Creatinine Clearance: 75 mL/min (A) (based on SCr of 1.38 mg/dL (H)).    Height:   Ht Readings from Last 1 Encounters:   04/30/24 2.083 m (6' 10\")     Weight:  Wt Readings from Last 1 Encounters:   04/30/24 115.7 kg (255 lb)               Plan: Based upon the patient's weight and renal function, the ordered dose of Enoxaparin 40 mg subQ daily has been changed/converted to Enoxaparin 30mg subQ BID      Thank you,  Chantel Whatley, [facility]

## 2024-04-30 NOTE — PROGRESS NOTES
Returned from cath lab,  alert and oriented,  tr band intact to right wrist area, site clean,  neuro vasc assessment WNL.

## 2024-04-30 NOTE — POST SEDATION
Sedation Post Procedure Note    Patient Name: Collin Prince   YOB: 1960  Room/Bed: [facility]/  Medical Record Number: [id number]  Date: 4/30/2024   Time: 11:10 AM         Physicians/Assistants: Cassandra Ga MD, MD    Procedure Performed:  LHC, coronary angiogram, PCI of mLAD    Post-Sedation Vital Signs:  Vitals:    04/30/24 1102   BP: (!) 147/81   Pulse: 57   Resp: 16   Temp:    SpO2: 94%      Vital signs were reviewed and were stable after the procedure (see flow sheet for vitals)            Post-Sedation Exam: Lungs: clear and Cardiovascular: normal           Complications: none    Electronically signed by Cassandra Ga MD on 4/30/2024 at 11:10 AM

## 2024-04-30 NOTE — PROGRESS NOTES
TRANSFER - OUT REPORT:    Verbal report given to Que RN on Collin Prince  being transferred to tele unit for routine progression of patient care       Report consisted of patient's Situation, Background, Assessment and   Recommendations(SBAR).     Information from the following report(s) Nurse Handoff Report was reviewed with the receiving nurse.           Lines:   Peripheral IV 04/30/24 Posterior;Right Hand (Active)        Opportunity for questions and clarification was provided.      Patient transported with:  Monitor and Registered Nurse

## 2024-04-30 NOTE — PROGRESS NOTES
Tr band removed without incident  area covered with 2x2 and Tegaderm.  Site clean without drainage , neuro vasc assessment of right arm and hand WNL

## 2024-04-30 NOTE — BRIEF OP NOTE
Brief Postoperative Note      Patient: Collin Prince  YOB: 1960  MRN: [id number]    Date of Procedure: 4/30/2024    Pre-Op Diagnosis Codes:     * Coronary artery disease with angina pectoris (HCC) [I25.119]     * Abnormal stress test [R94.39]    Post-Op Diagnosis: Same       Procedure(s):  Left heart cath / coronary angiography  Percutaneous coronary intervention    Surgeon(s):  Casasndra Ga MD    Assistant:  * No surgical staff found *    Anesthesia: IV Sedation    Estimated Blood Loss (mL): Minimal    Complications: None    Specimens:   * No specimens in log *    Implants:  Implant Name Type Inv. Item Serial No.  Lot No. LRB No. Used Action   STENT COR 23MM 2.5MM RX EVEROLIMUS ELUT XIENCE - [id number] Coronary stents STENT COR 23MM 2.5MM RX EVEROLIMUS ELUT XIENCE  [employer]-WD [id number] N/A 1 Implanted         Drains: * No LDAs found *    Findings:  Infection Present At Time Of Surgery (PATOS) (choose all levels that have infection present):  No infection present  Other Findings: LHC, Coronary angiogram and PCI of mLAD done via right radial approach.     Coronary angiogram revealed critical LAD stenosis. PCI of mLAD done with GISELLE with good result.    LV gram was not done. LVEDP 9 mm hg. No significant gradient on pull back.     Patient tolerated procedure well.     Scant blood loss.  No complications.  No specimen removed.     Recommendation:    Medication considered:   Aspirin, beta blocker, ACEI, Nitrates and statin   Brilinta 180 mg one dose followed by 90 mg twice a day  Integrilin 2 boluses   Dual antiplatelet for 12 months due to use of GISELLE  CAD risk factor education  Diet education  Control cholesterol  Blood pressure control  Exercise education: Age and functional status appropriate.  Cardiac rehab referral done     Electronically signed by Cassandra Ga MD on 4/30/2024 at 11:11 AM

## 2024-05-01 VITALS
WEIGHT: 255 LBS | OXYGEN SATURATION: 98 % | HEART RATE: 64 BPM | BODY MASS INDEX: 29.5 KG/M2 | RESPIRATION RATE: 16 BRPM | DIASTOLIC BLOOD PRESSURE: 85 MMHG | HEIGHT: 78 IN | SYSTOLIC BLOOD PRESSURE: 136 MMHG | TEMPERATURE: 97.9 F

## 2024-05-01 PROCEDURE — 2580000003 HC RX 258: Performed by: INTERNAL MEDICINE

## 2024-05-01 PROCEDURE — 96372 THER/PROPH/DIAG INJ SC/IM: CPT

## 2024-05-01 PROCEDURE — G0378 HOSPITAL OBSERVATION PER HR: HCPCS

## 2024-05-01 PROCEDURE — 6360000002 HC RX W HCPCS: Performed by: INTERNAL MEDICINE

## 2024-05-01 PROCEDURE — 6370000000 HC RX 637 (ALT 250 FOR IP): Performed by: INTERNAL MEDICINE

## 2024-05-01 RX ADMIN — TICAGRELOR 90 MG: 90 TABLET ORAL at 10:39

## 2024-05-01 RX ADMIN — SODIUM CHLORIDE, PRESERVATIVE FREE 10 ML: 5 INJECTION INTRAVENOUS at 10:43

## 2024-05-01 RX ADMIN — ENOXAPARIN SODIUM 30 MG: 100 INJECTION SUBCUTANEOUS at 10:39

## 2024-05-01 NOTE — PLAN OF CARE
Problem: Chronic Conditions and Co-morbidities  Goal: Patient's chronic conditions and co-morbidity symptoms are monitored and maintained or improved  5/1/2024 1922 by Maira Steve RN  Outcome: Progressing  5/1/2024 1921 by Maira Steve RN  Outcome: Progressing  Flowsheets (Taken 5/1/2024 0725)  Care Plan - Patient's Chronic Conditions and Co-Morbidity Symptoms are Monitored and Maintained or Improved:   Monitor and assess patient's chronic conditions and comorbid symptoms for stability, deterioration, or improvement   Collaborate with multidisciplinary team to address chronic and comorbid conditions and prevent exacerbation or deterioration     Problem: Discharge Planning  Goal: Discharge to home or other facility with appropriate resources  5/1/2024 1922 by Maira Steve RN  Outcome: Progressing  5/1/2024 1921 by Maira Steve RN  Outcome: Progressing  Flowsheets (Taken 5/1/2024 0725)  Discharge to home or other facility with appropriate resources:   Identify barriers to discharge with patient and caregiver   Arrange for needed discharge resources and transportation as appropriate   Identify discharge learning needs (meds, wound care, etc)   Refer to discharge planning if patient needs post-hospital services based on physician order or complex needs related to functional status, cognitive ability or social support system     Problem: ABCDS Injury Assessment  Goal: Absence of physical injury  5/1/2024 1922 by Maira Steve RN  Outcome: Progressing  5/1/2024 1921 by Maira Steve RN  Outcome: Progressing

## 2024-05-01 NOTE — PLAN OF CARE
Problem: Chronic Conditions and Co-morbidities  Goal: Patient's chronic conditions and co-morbidity symptoms are monitored and maintained or improved  Outcome: Progressing  Flowsheets (Taken 5/1/2024 0725)  Care Plan - Patient's Chronic Conditions and Co-Morbidity Symptoms are Monitored and Maintained or Improved:   Monitor and assess patient's chronic conditions and comorbid symptoms for stability, deterioration, or improvement   Collaborate with multidisciplinary team to address chronic and comorbid conditions and prevent exacerbation or deterioration     Problem: Discharge Planning  Goal: Discharge to home or other facility with appropriate resources  Outcome: Progressing  Flowsheets (Taken 5/1/2024 0725)  Discharge to home or other facility with appropriate resources:   Identify barriers to discharge with patient and caregiver   Arrange for needed discharge resources and transportation as appropriate   Identify discharge learning needs (meds, wound care, etc)   Refer to discharge planning if patient needs post-hospital services based on physician order or complex needs related to functional status, cognitive ability or social support system     Problem: ABCDS Injury Assessment  Goal: Absence of physical injury  Outcome: Progressing

## 2024-05-01 NOTE — PLAN OF CARE
Problem: Chronic Conditions and Co-morbidities  Goal: Patient's chronic conditions and co-morbidity symptoms are monitored and maintained or improved  5/1/2024 1923 by Maira Steve RN  Outcome: Progressing  5/1/2024 1922 by Maira Steve RN  Outcome: Progressing  5/1/2024 1921 by Maira Steve RN  Outcome: Progressing  Flowsheets (Taken 5/1/2024 0725)  Care Plan - Patient's Chronic Conditions and Co-Morbidity Symptoms are Monitored and Maintained or Improved:   Monitor and assess patient's chronic conditions and comorbid symptoms for stability, deterioration, or improvement   Collaborate with multidisciplinary team to address chronic and comorbid conditions and prevent exacerbation or deterioration     Problem: Discharge Planning  Goal: Discharge to home or other facility with appropriate resources  5/1/2024 1923 by Maira Steve RN  Outcome: Progressing  5/1/2024 1922 by Maira Steve RN  Outcome: Progressing  5/1/2024 1921 by Maira Steve RN  Outcome: Progressing  Flowsheets (Taken 5/1/2024 0725)  Discharge to home or other facility with appropriate resources:   Identify barriers to discharge with patient and caregiver   Arrange for needed discharge resources and transportation as appropriate   Identify discharge learning needs (meds, wound care, etc)   Refer to discharge planning if patient needs post-hospital services based on physician order or complex needs related to functional status, cognitive ability or social support system     Problem: ABCDS Injury Assessment  Goal: Absence of physical injury  5/1/2024 1923 by Maira Steve RN  Outcome: Progressing  5/1/2024 1922 by Maira Steve RN  Outcome: Progressing  5/1/2024 1921 by Maira Steve RN  Outcome: Progressing

## 2024-05-01 NOTE — PLAN OF CARE
Problem: Chronic Conditions and Co-morbidities  Goal: Patient's chronic conditions and co-morbidity symptoms are monitored and maintained or improved  Outcome: Progressing  Flowsheets (Taken 4/30/2024 1954)  Care Plan - Patient's Chronic Conditions and Co-Morbidity Symptoms are Monitored and Maintained or Improved: Monitor and assess patient's chronic conditions and comorbid symptoms for stability, deterioration, or improvement     Problem: Discharge Planning  Goal: Discharge to home or other facility with appropriate resources  Outcome: Progressing  Flowsheets (Taken 4/30/2024 1954)  Discharge to home or other facility with appropriate resources: Identify barriers to discharge with patient and caregiver     Problem: ABCDS Injury Assessment  Goal: Absence of physical injury  Outcome: Progressing

## 2024-05-01 NOTE — CARE COORDINATION
05/01/24 0945   Service Assessment   Patient Orientation Alert and Oriented   Cognition Alert   History Provided By Patient   Primary Caregiver Self   Accompanied By/Relationship  --    Support Systems Spouse/Significant Other   PCP Verified by CM Yes   Last Visit to PCP Within last 6 months   Prior Functional Level Independent in ADLs/IADLs   Current Functional Level Independent in ADLs/IADLs   Can patient return to prior living arrangement Yes   Ability to make needs known: Good   Family able to assist with home care needs: Yes   Would you like for me to discuss the discharge plan with any other family members/significant others, and if so, who? Yes   Financial Resources None   Community Resources None   Social/Functional History   Type of Home House   Bathroom Accessibility Accessible   Home Equipment None   ADL Assistance Independent   Homemaking Assistance Independent   Ambulation Assistance Independent   Transfer Assistance Independent   Active  Yes   Discharge Planning   Type of Residence House   Living Arrangements Spouse/Significant Other   Current Services Prior To Admission None   Potential Assistance Needed N/A   DME Ordered? No   Potential Assistance Purchasing Medications No   Type of Home Care Services None   Patient expects to be discharged to: House   History of falls? 0   Services At/After Discharge   Transition of Care Consult (CM Consult) Discharge Planning   Services At/After Discharge None   W[place]el Resource Information Provided? No   Mode of Transport at Discharge Other (see comment)  (Family)   Confirm Follow Up Transport Self   Condition of Participation: Discharge Planning   The Plan for Transition of Care is related to the following treatment goals: The patient states he is ready to see the cardiologist and discharge to home   The Patient and/or Patient Representative was provided with a Choice of Provider? Patient   The Patient and/Or Patient Representative agree with the

## 2024-05-01 NOTE — DISCHARGE SUMMARY
Discharge Summary    Patient: Collin Prince MRN: [id number]  CSN: [id number]    YOB: 1960  Age: 64 y.o.  Sex: male    DOA: 4/30/2024 LOS:  LOS: 0 days   Discharge Date:      Primary Care Provider:  Lauren Maldonado DO    Admission Diagnoses: Coronary artery disease with angina pectoris (HCC) [I25.119]  Abnormal stress test [R94.39]  Status post angioplasty with stent [Z95.820]    Discharge Diagnoses:  CAD s/p PCI of mLAD    Discharge Condition: Stable    Discharge Medications:        Medication List        START taking these medications      ticagrelor 90 MG Tabs tablet  Commonly known as: BRILINTA  Take 1 tablet by mouth 2 times daily            CHANGE how you take these medications      levothyroxine 25 MCG tablet  Commonly known as: SYNTHROID  What changed: Another medication with the same name was removed. Continue taking this medication, and follow the directions you see here.            CONTINUE taking these medications      aspirin 81 MG EC tablet     atorvastatin 80 MG tablet  Commonly known as: LIPITOR     hydroCHLOROthiazide 25 MG tablet  Commonly known as: HYDRODIURIL     nitroGLYCERIN 0.4 MG SL tablet  Commonly known as: NITROSTAT     potassium citrate 10 MEQ (1080 MG) extended release tablet  Commonly known as: UROCIT-K     vitamin D 1.25 MG (98079 UT) Caps capsule  Commonly known as: ERGOCALCIFEROL            STOP taking these medications      clopidogrel 75 MG tablet  Commonly known as: PLAVIX     Farxiga 5 MG tablet  Generic drug: dapagliflozin     isosorbide dinitrate 5 MG tablet  Commonly known as: ISORDIL     pantoprazole 40 MG tablet  Commonly known as: PROTONIX     therapeutic multivitamin-minerals tablet            ASK your doctor about these medications      allopurinol 300 MG tablet  Commonly known as: ZYLOPRIM     tamsulosin 0.4 MG capsule  Commonly known as: FLOMAX               Where to Get Your Medications        These medications were sent to [facility]/pharmacy #[id number] -

## 2024-05-09 ENCOUNTER — HOSPITAL ENCOUNTER (OUTPATIENT)
Facility: HOSPITAL | Age: 64
Setting detail: RECURRING SERIES
Discharge: HOME | End: 2024-05-12
Payer: COMMERCIAL

## 2024-05-09 PROCEDURE — 93798 PHYS/QHP OP CAR RHAB W/ECG: CPT

## 2024-05-09 NOTE — PROGRESS NOTES
Mr. Jones states he is unable to make a 2-3x week committment to cardiac rehab due to it being a 90 minute commute.     Spoke with Jean Claude at cardiac rehabilitation center in [place]. This location is closer to pt [place].    Faxed referral to facility.

## 2024-08-06 NOTE — INTERVAL H&P NOTE
Update History & Physical    The Patient's History and Physical of September 5, 2022 was reviewed with the patient and I examined the patient. There was no change. The surgical site was confirmed by the patient and me. Plan:  The risk, benefits, expected outcome, and alternative to the recommended procedure have been discussed with the patient. Patient understands and wants to proceed with the procedure.     Electronically signed by Haroldine Duverney, MD on 9/6/2022 at 8:41 AM
T(C): 36.4 (08-06-24 @ 10:38), Max: 37.8 (08-06-24 @ 06:23)  HR: 55 (08-06-24 @ 10:38) (55 - 89)  BP: 130/82 (08-06-24 @ 10:38) (93/49 - 133/76)  RR: 16 (08-06-24 @ 10:38) (12 - 18)  SpO2: 96% (08-06-24 @ 10:38) (96% - 100%)    General: No apparent distress  Head: normocephalic, atraumatic  Eyes: EOMI, anicteric  ENT: moist mucous membranes, no pharyngeal exudates  Heart: rrr, S1, S2, no murmurs  Chest: CTA b/l, no rales, rhonchi, or wheezes  Abd: BS+, soft, NT, ND, surgical site, c/d/i  Extr: no edema or cyanosis  Neuro: AA&Ox3, no focal weakness, sensation to light touch intact  Psych: normal affect

## 2024-12-03 ENCOUNTER — HOSPITAL ENCOUNTER (EMERGENCY)
Facility: HOSPITAL | Age: 64
Discharge: OTHER INSTITUTION | End: 2024-12-03
Attending: EMERGENCY MEDICINE
Payer: COMMERCIAL

## 2024-12-03 VITALS
HEIGHT: 78 IN | DIASTOLIC BLOOD PRESSURE: 75 MMHG | RESPIRATION RATE: 21 BRPM | OXYGEN SATURATION: 96 % | HEART RATE: 74 BPM | WEIGHT: 250 LBS | TEMPERATURE: 97.5 F | SYSTOLIC BLOOD PRESSURE: 133 MMHG | BODY MASS INDEX: 28.93 KG/M2

## 2024-12-03 DIAGNOSIS — T23.262A PARTIAL THICKNESS BURN OF BACK OF LEFT HAND, INITIAL ENCOUNTER: ICD-10-CM

## 2024-12-03 DIAGNOSIS — T24.221A PARTIAL THICKNESS BURN OF RIGHT KNEE, INITIAL ENCOUNTER: ICD-10-CM

## 2024-12-03 DIAGNOSIS — T20.20XA FACIAL BURN, SECOND DEGREE, INITIAL ENCOUNTER: Primary | ICD-10-CM

## 2024-12-03 LAB
ANION GAP SERPL CALC-SCNC: 9 MMOL/L (ref 2–12)
BASOPHILS # BLD: 0.1 K/UL (ref 0–0.1)
BASOPHILS NFR BLD: 1 % (ref 0–1)
BUN SERPL-MCNC: 21 MG/DL (ref 6–20)
BUN/CREAT SERPL: 12 (ref 12–20)
CA-I BLD-MCNC: 9.6 MG/DL (ref 8.5–10.1)
CHLORIDE SERPL-SCNC: 105 MMOL/L (ref 97–108)
CO2 SERPL-SCNC: 24 MMOL/L (ref 21–32)
CREAT SERPL-MCNC: 1.72 MG/DL (ref 0.7–1.3)
DIFFERENTIAL METHOD BLD: ABNORMAL
EOSINOPHIL # BLD: 0.1 K/UL (ref 0–0.4)
EOSINOPHIL NFR BLD: 1 % (ref 0–7)
ERYTHROCYTE [DISTWIDTH] IN BLOOD BY AUTOMATED COUNT: 11.9 % (ref 11.5–14.5)
GLUCOSE SERPL-MCNC: 218 MG/DL (ref 65–100)
HCT VFR BLD AUTO: 45.4 % (ref 36.6–50.3)
HGB BLD-MCNC: 15.4 G/DL (ref 12.1–17)
IMM GRANULOCYTES # BLD AUTO: 0 K/UL (ref 0–0.04)
IMM GRANULOCYTES NFR BLD AUTO: 0 % (ref 0–0.5)
LYMPHOCYTES # BLD: 1.1 K/UL (ref 0.8–3.5)
LYMPHOCYTES NFR BLD: 9 % (ref 12–49)
MCH RBC QN AUTO: 29.9 PG (ref 26–34)
MCHC RBC AUTO-ENTMCNC: 33.9 G/DL (ref 30–36.5)
MCV RBC AUTO: 88.2 FL (ref 80–99)
MONOCYTES # BLD: 1.1 K/UL (ref 0–1)
MONOCYTES NFR BLD: 9 % (ref 5–13)
NEUTS SEG # BLD: 9.1 K/UL (ref 1.8–8)
NEUTS SEG NFR BLD: 80 % (ref 32–75)
NRBC # BLD: 0 K/UL (ref 0–0.01)
NRBC BLD-RTO: 0 PER 100 WBC
PLATELET # BLD AUTO: 283 K/UL (ref 150–400)
PMV BLD AUTO: 9.9 FL (ref 8.9–12.9)
POTASSIUM SERPL-SCNC: 3 MMOL/L (ref 3.5–5.1)
RBC # BLD AUTO: 5.15 M/UL (ref 4.1–5.7)
SODIUM SERPL-SCNC: 138 MMOL/L (ref 136–145)
WBC # BLD AUTO: 11.4 K/UL (ref 4.1–11.1)

## 2024-12-03 PROCEDURE — 80048 BASIC METABOLIC PNL TOTAL CA: CPT

## 2024-12-03 PROCEDURE — 6830039000 HC L3 TRAUMA ALERT

## 2024-12-03 PROCEDURE — 96374 THER/PROPH/DIAG INJ IV PUSH: CPT

## 2024-12-03 PROCEDURE — 2580000003 HC RX 258: Performed by: EMERGENCY MEDICINE

## 2024-12-03 PROCEDURE — 36415 COLL VENOUS BLD VENIPUNCTURE: CPT

## 2024-12-03 PROCEDURE — 85025 COMPLETE CBC W/AUTO DIFF WBC: CPT

## 2024-12-03 PROCEDURE — 99285 EMERGENCY DEPT VISIT HI MDM: CPT

## 2024-12-03 PROCEDURE — 6360000002 HC RX W HCPCS: Performed by: EMERGENCY MEDICINE

## 2024-12-03 RX ORDER — LEVOTHYROXINE SODIUM 200 UG/1
200 TABLET ORAL DAILY
COMMUNITY

## 2024-12-03 RX ORDER — FENTANYL CITRATE 50 UG/ML
50 INJECTION, SOLUTION INTRAMUSCULAR; INTRAVENOUS
Status: DISCONTINUED | OUTPATIENT
Start: 2024-12-03 | End: 2024-12-03 | Stop reason: HOSPADM

## 2024-12-03 RX ORDER — ALLOPURINOL 300 MG/1
300 TABLET ORAL DAILY
COMMUNITY

## 2024-12-03 RX ORDER — METOPROLOL SUCCINATE 25 MG/1
25 TABLET, EXTENDED RELEASE ORAL DAILY
COMMUNITY

## 2024-12-03 RX ORDER — DAPAGLIFLOZIN 5 MG/1
5 TABLET, FILM COATED ORAL EVERY MORNING
COMMUNITY

## 2024-12-03 RX ADMIN — WATER 2000 MG: 1 INJECTION INTRAMUSCULAR; INTRAVENOUS; SUBCUTANEOUS at 14:20

## 2024-12-03 ASSESSMENT — PAIN - FUNCTIONAL ASSESSMENT: PAIN_FUNCTIONAL_ASSESSMENT: 0-10

## 2024-12-03 ASSESSMENT — LIFESTYLE VARIABLES
HOW OFTEN DO YOU HAVE A DRINK CONTAINING ALCOHOL: NEVER
HOW MANY STANDARD DRINKS CONTAINING ALCOHOL DO YOU HAVE ON A TYPICAL DAY: PATIENT DOES NOT DRINK

## 2024-12-03 ASSESSMENT — PAIN SCALES - GENERAL: PAINLEVEL_OUTOF10: 3
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
## 2024-12-03 NOTE — PROGRESS NOTES
Admission Medication Reconciliation:    Information obtained from:  Patient med list, patient, RxQuery  RxQuery data available¹: Yes    Comments/Recommendations: Updated PTA meds/reviewed patient's allergies.    1)  Patient brought med list in wallet, [facility] w/ RxQuery. Pt is a good historian.    2)  Medication changes (since last review):  Added  - Metoprolol  - Farxiga    Adjusted  - Synthroid (225 mcg daily total)  - HCTZ    Removed  - Flomax    3)  Pt holds potassium when he has gout flares, which he currently does     ¹RxQuery pharmacy benefit data reflects medications filled and processed through the patient's insurance, however   this data does NOT capture whether the medication was picked up or is currently being taken by the patient.

## 2024-12-03 NOTE — ED NOTES
Emtala signed by pt provider rn and transport team.   Pt chart facesheet and emtala given to transport team, along with belongings, including long johns, jeans, shirt, glasses, wallet, tennis shoes, and phone.   Pt alert oriented and ambulatory at time of departure from ed  Pt iv intacted, airway patent, GCS 15. In gown.
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
## (undated) DEVICE — SEAL INSTR CYSTO ADJ BX PRT SEAL FOR ACC UP TO 6FR

## (undated) DEVICE — CYSTO PACK: Brand: MEDLINE INDUSTRIES, INC.

## (undated) DEVICE — CATHETER GUID 6FR L100CM DIA0.071IN NYL SHFT JL3.5 W/O SIDE

## (undated) DEVICE — GLOVE ORANGE PI 7 1/2   MSG9075

## (undated) DEVICE — SYRINGE MED 10ML PUR GAM COMPATIBLE POLYCARB FIX M LUER CONN

## (undated) DEVICE — DRAPE EP LT SUBCLAV ENTRY SHLD SORBX

## (undated) DEVICE — SINGLE-USE DIGITAL FLEXIBLE URETEROSCOPE: Brand: LITHOVUE

## (undated) DEVICE — GOWN,PREVENTION PLUS,XLN/XL,ST,24/CS: Brand: MEDLINE

## (undated) DEVICE — SOLUTION SCRB 4OZ 4% CHG H2O AIDED FOR PREOPERATIVE SKIN

## (undated) DEVICE — VINYL ACETATE UROLOGICAL DRAINAGE BAG FOR GE/OEC SYSTEMS W/ 8MM PLUG - NON-STERILE: Brand: CUSTOM MEDICAL SPECIALTIES, INC.

## (undated) DEVICE — CATHETER URET L70CM OD6FR OPN END FLEXIMA

## (undated) DEVICE — SENSOR PLSE OXMTR AD CBL L36IN ADH FRM FIT SPO2 DISP

## (undated) DEVICE — GLOVE SURG SZ 75 L12IN FNGR THK79MIL GRN LTX FREE

## (undated) DEVICE — DEVICE COMPR LNG 27 CM VASC BND

## (undated) DEVICE — TRAP MUCUS SPECIMEN 40ML -- MEDICHOICE

## (undated) DEVICE — 3M™ STERI-DRAPE™ SMALL DRAPE WITH ADHESIVE APERTURE 1092 25/BX,4/CS&#X20;: Brand: STERI-DRAPE™

## (undated) DEVICE — DRAINBAG,ANTI-REFLUX TOWER,L/F,2000ML,LL: Brand: MEDLINE

## (undated) DEVICE — GLIDESHEATH SLENDER STAINLESS STEEL KIT: Brand: GLIDESHEATH SLENDER

## (undated) DEVICE — NITINOL STONE RETRIEVAL BASKET: Brand: ZERO TIP

## (undated) DEVICE — INFLATION DEVICE: Brand: ENCORE 26

## (undated) DEVICE — DRAPE XR C ARM 41X74IN LF --

## (undated) DEVICE — GARMENT,MEDLINE,DVT,INT,CALF,MED, GEN2: Brand: MEDLINE

## (undated) DEVICE — GUIDE 6FR EBU3.5 MEDTRONIC 100CM

## (undated) DEVICE — SURGICAL PROCEDURE TRAY CRD CATH 3 PRT

## (undated) DEVICE — GUIDEWIRE VASC L260CM DIA0.035IN TIP L3MM STD EXCHG PTFE J

## (undated) DEVICE — TUBING, SUCTION, 1/4" X 12', STRAIGHT: Brand: MEDLINE

## (undated) DEVICE — CATH BLLN ANGIO 2.25X12MM SC EUPHORA RX

## (undated) DEVICE — SPONGE GZ W4XL4IN COT 12 PLY TYP VII WVN C FLD DSGN

## (undated) DEVICE — ELECTRODES QUIK COMBO CONN RTS DEFIB

## (undated) DEVICE — CONTAINER,SPECIMEN,PNEU TUBE,4OZ,OR STRL: Brand: MEDLINE

## (undated) DEVICE — DUAL LUMEN URETERAL CATHETER

## (undated) DEVICE — DRAPE EQUIP C ARM 74X42 IN MOB XR W/ TIE RUBBER BND LF

## (undated) DEVICE — Device

## (undated) DEVICE — RUNTHROUGH NS EXTRA FLOPPY PTCA GUIDEWIRE: Brand: RUNTHROUGH

## (undated) DEVICE — PREP PAD BNS: Brand: CONVERTORS

## (undated) DEVICE — BAG PRESSURE INFUSION 3 W STOPCOCK 3000 CC PREMIERPRO DISP

## (undated) DEVICE — SYRINGE MED 10ML RED POLYCARB BRL FIX M LUER CONN FLAT GRP

## (undated) DEVICE — PACK PROCEDURE SURG CATH CUST

## (undated) DEVICE — BALLOON DILATATION CATHETER: Brand: UROMAX ULTRA

## (undated) DEVICE — SEAL ENDOSCP INSTR 7FR BX SELF SEAL

## (undated) DEVICE — WASTEBAG DRIP/ADAPTER: Brand: MEDLINE INDUSTRIES, INC.

## (undated) DEVICE — STRAP,POSITIONING,KNEE/BODY,FOAM,4X60": Brand: MEDLINE

## (undated) DEVICE — SOLUTION IRRIG 3000ML 0.9% SOD CHL FLX CONT 0797208] ICU MEDICAL INC]

## (undated) DEVICE — GDWIRE UROL STR 150CM FLX TP -- BX/5 SENSOR

## (undated) DEVICE — CATH 5F 100CM JL35 -- DXTERITY

## (undated) DEVICE — SOLUTION IRRIG 3000ML STRL H2O USP UROMATIC PLAS CONT

## (undated) DEVICE — STOPCOCK TRNSDUC 500PSI 3 W ROT M LUER LT BLU OFF HNDL R

## (undated) DEVICE — SOLUTION IRRIG 500ML STRL H2O NONPYROGENIC

## (undated) DEVICE — CATHETER ETER URET 5FR L70CM 0038IN FLX OPN TIP NO SIDE EYE

## (undated) DEVICE — CATH BLLN ANGIO 2.75X12MM NC EUPHORIA RX

## (undated) DEVICE — DRAPE,ANGIO,BRACH,STERILE,38X44: Brand: MEDLINE

## (undated) DEVICE — COPILOT KIT INCLUDES BLEEDBACK CONTROL VALVE 20/30 INDEFLATOR INFLATION DEVICE 30 ATM 20 CC / GUIDE WIRE INTRODUCER / TORQUE DEVICE: Brand: INDEFLATOR

## (undated) DEVICE — SHEATH URO 11 13FRX46CM UROPS

## (undated) DEVICE — BAND COMPR L29CM XLN CLR PLAS HEMSTAT EXT HK AND LOOP RETEN

## (undated) DEVICE — GDWIRE 3CM FLX-TIP 0.038X150CM -- BX/5 SENSOR

## (undated) DEVICE — SPLINT WR VELC FOAM NEUT POS DISP FOR RAD ART ACC SFT STRP